# Patient Record
Sex: FEMALE | Race: WHITE | NOT HISPANIC OR LATINO | Employment: OTHER | URBAN - NONMETROPOLITAN AREA
[De-identification: names, ages, dates, MRNs, and addresses within clinical notes are randomized per-mention and may not be internally consistent; named-entity substitution may affect disease eponyms.]

---

## 2017-10-31 ENCOUNTER — TRANSCRIBE ORDERS (OUTPATIENT)
Dept: CARDIOLOGY | Facility: CLINIC | Age: 72
End: 2017-10-31

## 2017-10-31 DIAGNOSIS — R00.2 PALPITATIONS: ICD-10-CM

## 2017-10-31 DIAGNOSIS — R94.31 ABNORMAL EKG: Primary | ICD-10-CM

## 2017-11-14 ENCOUNTER — CONSULT (OUTPATIENT)
Dept: CARDIOLOGY | Facility: CLINIC | Age: 72
End: 2017-11-14

## 2017-11-14 VITALS
DIASTOLIC BLOOD PRESSURE: 86 MMHG | BODY MASS INDEX: 20.73 KG/M2 | HEIGHT: 63 IN | HEART RATE: 80 BPM | WEIGHT: 117 LBS | SYSTOLIC BLOOD PRESSURE: 118 MMHG

## 2017-11-14 DIAGNOSIS — R01.1 MURMUR, CARDIAC: ICD-10-CM

## 2017-11-14 DIAGNOSIS — E78.00 HYPERCHOLESTEREMIA: ICD-10-CM

## 2017-11-14 DIAGNOSIS — I10 ESSENTIAL HYPERTENSION: Primary | ICD-10-CM

## 2017-11-14 DIAGNOSIS — I25.6 SILENT MYOCARDIAL ISCHEMIA: ICD-10-CM

## 2017-11-14 DIAGNOSIS — R94.31 ABNORMAL EKG: ICD-10-CM

## 2017-11-14 DIAGNOSIS — R05.9 COUGH: ICD-10-CM

## 2017-11-14 DIAGNOSIS — R00.2 PALPITATIONS: ICD-10-CM

## 2017-11-14 DIAGNOSIS — R09.89 BILATERAL CAROTID BRUITS: ICD-10-CM

## 2017-11-14 DIAGNOSIS — R06.02 SHORTNESS OF BREATH: ICD-10-CM

## 2017-11-14 PROCEDURE — 99406 BEHAV CHNG SMOKING 3-10 MIN: CPT | Performed by: INTERNAL MEDICINE

## 2017-11-14 PROCEDURE — 93000 ELECTROCARDIOGRAM COMPLETE: CPT | Performed by: INTERNAL MEDICINE

## 2017-11-14 PROCEDURE — 99204 OFFICE O/P NEW MOD 45 MIN: CPT | Performed by: INTERNAL MEDICINE

## 2017-11-14 RX ORDER — BUDESONIDE 3 MG/1
3 CAPSULE, COATED PELLETS ORAL EVERY MORNING
COMMUNITY

## 2017-11-14 RX ORDER — PANTOPRAZOLE SODIUM 40 MG/1
40 TABLET, DELAYED RELEASE ORAL DAILY
COMMUNITY
End: 2021-01-01 | Stop reason: DRUGHIGH

## 2017-11-14 RX ORDER — FLUCONAZOLE 100 MG/1
100 TABLET ORAL DAILY
COMMUNITY
End: 2020-01-22 | Stop reason: ALTCHOICE

## 2017-11-14 RX ORDER — PROPRANOLOL HYDROCHLORIDE 20 MG/1
20 TABLET ORAL 2 TIMES DAILY
COMMUNITY
End: 2020-01-22 | Stop reason: ALTCHOICE

## 2017-11-14 RX ORDER — BROMPHENIRAMINE MALEATE, PSEUDOEPHEDRINE HYDROCHLORIDE, AND DEXTROMETHORPHAN HYDROBROMIDE 2; 30; 10 MG/5ML; MG/5ML; MG/5ML
10 SYRUP ORAL EVERY 4 HOURS PRN
COMMUNITY
End: 2020-01-22 | Stop reason: ALTCHOICE

## 2017-11-14 RX ORDER — CITALOPRAM 40 MG/1
40 TABLET ORAL DAILY
COMMUNITY
End: 2020-01-22 | Stop reason: ALTCHOICE

## 2017-11-14 RX ORDER — ATORVASTATIN CALCIUM 40 MG/1
TABLET, FILM COATED ORAL
COMMUNITY
End: 2020-03-16 | Stop reason: HOSPADM

## 2017-11-14 RX ORDER — AMOXICILLIN AND CLAVULANATE POTASSIUM 875; 125 MG/1; MG/1
1 TABLET, FILM COATED ORAL 2 TIMES DAILY
COMMUNITY
End: 2020-01-22 | Stop reason: ALTCHOICE

## 2017-11-14 RX ORDER — DULOXETIN HYDROCHLORIDE 60 MG/1
60 CAPSULE, DELAYED RELEASE ORAL 2 TIMES DAILY
COMMUNITY

## 2017-11-14 RX ORDER — ASPIRIN 81 MG/1
81 TABLET ORAL DAILY
COMMUNITY
End: 2020-03-16 | Stop reason: HOSPADM
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
## 2017-11-30 ENCOUNTER — HOSPITAL ENCOUNTER (OUTPATIENT)
Dept: CARDIOLOGY | Facility: HOSPITAL | Age: 72
Discharge: HOME | End: 2017-11-30
Attending: INTERNAL MEDICINE

## 2017-11-30 ENCOUNTER — OUTSIDE FACILITY SERVICE (OUTPATIENT)
Dept: CARDIOLOGY | Facility: CLINIC | Age: 72
End: 2017-11-30

## 2017-11-30 DIAGNOSIS — R94.31 ABNORMAL EKG: ICD-10-CM

## 2017-11-30 DIAGNOSIS — R01.1 MURMUR, CARDIAC: ICD-10-CM

## 2017-11-30 DIAGNOSIS — R06.02 SHORTNESS OF BREATH: ICD-10-CM

## 2017-11-30 LAB
MAXIMAL PREDICTED HEART RATE: 148 BPM
MAXIMAL PREDICTED HEART RATE: 148 BPM
STRESS TARGET HR: 126 BPM
STRESS TARGET HR: 126 BPM

## 2017-11-30 PROCEDURE — A9500 TC99M SESTAMIBI: HCPCS | Performed by: INTERNAL MEDICINE

## 2017-11-30 PROCEDURE — 93017 CV STRESS TEST TRACING ONLY: CPT

## 2017-11-30 PROCEDURE — 93018 CV STRESS TEST I&R ONLY: CPT | Performed by: INTERNAL MEDICINE

## 2017-11-30 PROCEDURE — 0 TECHNETIUM SESTAMIBI: Performed by: INTERNAL MEDICINE

## 2017-11-30 PROCEDURE — 25010000002 REGADENOSON 0.4 MG/5ML SOLUTION: Performed by: INTERNAL MEDICINE

## 2017-11-30 PROCEDURE — 93306 TTE W/DOPPLER COMPLETE: CPT

## 2017-11-30 PROCEDURE — 78452 HT MUSCLE IMAGE SPECT MULT: CPT

## 2017-11-30 PROCEDURE — 93306 TTE W/DOPPLER COMPLETE: CPT | Performed by: INTERNAL MEDICINE

## 2017-11-30 PROCEDURE — 78452 HT MUSCLE IMAGE SPECT MULT: CPT | Performed by: INTERNAL MEDICINE

## 2017-11-30 RX ADMIN — TECHNETIUM TC-99M SESTAMIBI 1 DOSE: 1 INJECTION INTRAVENOUS at 08:45

## 2017-11-30 RX ADMIN — REGADENOSON 0.4 MG: 0.08 INJECTION, SOLUTION INTRAVENOUS at 08:45

## 2017-12-04 ENCOUNTER — TELEPHONE (OUTPATIENT)
Dept: CARDIOLOGY | Facility: CLINIC | Age: 72
End: 2017-12-04

## 2017-12-04 RX ORDER — ISOSORBIDE MONONITRATE 30 MG/1
30 TABLET, EXTENDED RELEASE ORAL DAILY
Qty: 30 TABLET | Refills: 7 | Status: SHIPPED | OUTPATIENT
Start: 2017-12-04 | End: 2020-01-22 | Stop reason: ALTCHOICE

## 2017-12-13 ENCOUNTER — TELEPHONE (OUTPATIENT)
Dept: CARDIOLOGY | Facility: CLINIC | Age: 72
End: 2017-12-13

## 2017-12-13 RX ORDER — AMLODIPINE BESYLATE 2.5 MG/1
2.5 TABLET ORAL DAILY
Qty: 90 TABLET | Refills: 3 | Status: SHIPPED | OUTPATIENT
Start: 2017-12-13 | End: 2020-01-22 | Stop reason: ALTCHOICE

## 2017-12-13 NOTE — TELEPHONE ENCOUNTER
Patient called and reported that she cannot tolerate isosorbide 30 mg due to sever headache. What are your recommendations?

## 2017-12-13 NOTE — TELEPHONE ENCOUNTER
Patient aware to stop taking isosorbide and to start taking norvasc 2.5 mg QD. Script for norvasc 2.5 mg QD with 90 day supply and 3 refills.

## 2020-01-22 ENCOUNTER — OFFICE VISIT (OUTPATIENT)
Dept: CARDIOLOGY | Facility: CLINIC | Age: 75
End: 2020-01-22

## 2020-01-22 VITALS
BODY MASS INDEX: 20.73 KG/M2 | HEART RATE: 70 BPM | SYSTOLIC BLOOD PRESSURE: 110 MMHG | WEIGHT: 117 LBS | DIASTOLIC BLOOD PRESSURE: 70 MMHG | HEIGHT: 63 IN

## 2020-01-22 DIAGNOSIS — R00.2 PALPITATIONS: ICD-10-CM

## 2020-01-22 DIAGNOSIS — R42 DIZZINESS: Primary | ICD-10-CM

## 2020-01-22 DIAGNOSIS — F17.200 SMOKING: ICD-10-CM

## 2020-01-22 DIAGNOSIS — E78.00 HYPERCHOLESTEREMIA: ICD-10-CM

## 2020-01-22 DIAGNOSIS — I49.3 PVC (PREMATURE VENTRICULAR CONTRACTION): ICD-10-CM

## 2020-01-22 DIAGNOSIS — I10 ESSENTIAL HYPERTENSION: ICD-10-CM

## 2020-01-22 PROCEDURE — 99406 BEHAV CHNG SMOKING 3-10 MIN: CPT | Performed by: INTERNAL MEDICINE

## 2020-01-22 PROCEDURE — 93000 ELECTROCARDIOGRAM COMPLETE: CPT | Performed by: INTERNAL MEDICINE

## 2020-01-22 PROCEDURE — 99214 OFFICE O/P EST MOD 30 MIN: CPT | Performed by: INTERNAL MEDICINE

## 2020-01-22 RX ORDER — SERTRALINE HYDROCHLORIDE 25 MG/1
25 TABLET, FILM COATED ORAL DAILY
COMMUNITY
End: 2020-11-18 | Stop reason: ALTCHOICE

## 2020-01-22 RX ORDER — METOPROLOL TARTRATE 50 MG/1
50 TABLET, FILM COATED ORAL 2 TIMES DAILY
Qty: 60 TABLET | Refills: 11 | Status: SHIPPED | OUTPATIENT
Start: 2020-01-22 | End: 2020-02-13 | Stop reason: ALTCHOICE

## 2020-01-22 RX ORDER — SODIUM PHOSPHATE,MONO-DIBASIC 19G-7G/118
ENEMA (ML) RECTAL 2 TIMES DAILY WITH MEALS
COMMUNITY

## 2020-01-22 RX ORDER — CELECOXIB 100 MG/1
100 CAPSULE ORAL 2 TIMES DAILY
COMMUNITY
End: 2020-03-16 | Stop reason: HOSPADM

## 2020-01-22 RX ORDER — BUSPIRONE HYDROCHLORIDE 10 MG/1
10 TABLET ORAL 3 TIMES DAILY PRN
COMMUNITY
End: 2020-11-18

## 2020-01-22 RX ORDER — PROPRANOLOL HYDROCHLORIDE 40 MG/1
80 TABLET ORAL 2 TIMES DAILY
COMMUNITY
End: 2020-01-22

## 2020-01-22 RX ORDER — LANOLIN ALCOHOL/MO/W.PET/CERES
2000 CREAM (GRAM) TOPICAL DAILY
COMMUNITY

## 2020-01-22 RX ORDER — ALBUTEROL SULFATE 90 UG/1
2 AEROSOL, METERED RESPIRATORY (INHALATION) EVERY 4 HOURS PRN
COMMUNITY

## 2020-01-22 RX ORDER — LORATADINE 10 MG/1
10 TABLET ORAL DAILY
COMMUNITY

## 2020-01-22 RX ORDER — CHOLECALCIFEROL (VITAMIN D3) 125 MCG
5000 CAPSULE ORAL DAILY
COMMUNITY

## 2020-01-22 RX ORDER — AMLODIPINE BESYLATE 5 MG/1
5 TABLET ORAL 2 TIMES DAILY
COMMUNITY
End: 2020-11-18 | Stop reason: ALTCHOICE

## 2020-01-22 NOTE — PROGRESS NOTES
"Chief Complaint   Patient presents with   • Follow-up     Cardiac management. PCP requesting appointment for PVC's. She states \"yesterday it felt like I had a wave go thru my head, then my ear stopped up for few seconds, felt drained, then felt dizzy\". Increased aspirin 81mg to BID and Propanolol to 80mg BID yesterday.   • Lab     Have copy of most recent labs.   • Dizziness     Postional.   • Chest Pain     Last week had episode of intense pounding of heart, with pressure that radiated to right arm, with dizziness and nausea. She reports when this happened she was upset.       CARDIAC COMPLAINTS  chest pressure/discomfort, Dizziness and palpitations        Subjective   Maria Luz Wade is a 74 y.o. female came in today to be reevaluated regarding her cardiac status.  She has history of hypertension, hypercholesterolemia who was initially seen in 2017 for chest pain and shortness of breath.  She did undergo stress test in the form of Lexiscan which showed questionable area of ischemia involving the anterior wall.  Her echocardiogram showed normal LV systolic function with mild to moderate mitral regurgitation.  Her carotid ultrasound did not show any significant stenosis.  She was tried on Imdur and she was not able to tolerate so amlodipine was added.  I have not seen her for the last 2 years.  She has been under tremendous amount of stress recently.  She is taking care of 2 grandchildren.  Yesterday she had an episode where she felt like a wave going through her head and then felt that her ears were stuffed up for few seconds and then she got better.  After these episodes she felt tired weak and started having some dizziness.  She was seen at your office and the EKG showed some PVC's.  Her lab work showed the potassium was 4.1, magnesium was normal at 1.8 and TSH was normal at 3.06.  She was given aspirin and increase the dose of the Inderal and so far she has not had any more episodes.  About a week ago she did " have a chest pain in the form of pressure-like feeling associated with pounding of her heart.  The symptoms radiated both the right arm as well as the left arm and it was associated with some dizziness and nausea.  She has not had any more symptoms.  She unfortunately still continues to smoke about a pack a day.  Stroke does run in the family.              Cardiac History  Past Surgical History:   Procedure Laterality Date   • CARDIOVASCULAR STRESS TEST  11/30/2017    L.Myoview- R/O Anterior Ischemia.   • ECHO - CONVERTED  11/30/2017    EF 65%. Mild-Mod MR   • OTHER SURGICAL HISTORY  2015    CT Scan- 60% (L) ICA. 50% (R) ICA   • US CAROTID UNILATERAL  11/16/2017    @ [facility]. No sig stenosis       Current Outpatient Medications   Medication Sig Dispense Refill   • albuterol sulfate  (90 Base) MCG/ACT inhaler Inhale 2 puffs Every 4 (Four) Hours As Needed for Wheezing.     • amLODIPine (NORVASC) 5 MG tablet Take 5 mg by mouth 2 (Two) Times a Day.     • aspirin 81 MG EC tablet Take 81 mg by mouth 2 (Two) Times a Day.     • atorvastatin (LIPITOR) 40 MG tablet One and 1/2 tablets at night     • Budesonide (ENTOCORT EC) 3 MG 24 hr capsule Take 3 mg by mouth Every Morning.     • busPIRone (BUSPAR) 10 MG tablet Take 10 mg by mouth 3 (Three) Times a Day.     • celecoxib (CeleBREX) 100 MG capsule Take 100 mg by mouth 2 (Two) Times a Day.     • Cholecalciferol (VITAMIN D) 125 MCG (5000 UT) capsule capsule Take 5,000 Units by mouth Daily.     • DULoxetine (CYMBALTA) 60 MG capsule Take 60 mg by mouth 2 (Two) Times a Day.     • glucosamine-chondroitin 500-400 MG capsule capsule Take  by mouth 2 (Two) Times a Day With Meals.     • loratadine (CLARITIN) 10 MG tablet Take 10 mg by mouth Daily.     • pantoprazole (PROTONIX) 40 MG EC tablet Take 40 mg by mouth Daily.     • sertraline (ZOLOFT) 25 MG tablet Take 25 mg by mouth Daily.     • vitamin B-12 (CYANOCOBALAMIN) 1000 MCG tablet Take 2,000 mcg by mouth Daily.     • metoprolol  tartrate (LOPRESSOR) 50 MG tablet Take 1 tablet by mouth 2 (Two) Times a Day. 60 tablet 11   • nicotine (NICOTROL) 10 MG inhaler Inhale 1 puff As Needed for Smoking Cessation. 125 inhaler 4     No current facility-administered medications for this visit.        Allergies  :  Patient has no known allergies.       Past Medical History:   Diagnosis Date   • History of tonsillectomy 1951   • History of tubal ligation    • Hyperlipidemia        Social History     Socioeconomic History   • Marital status:      Spouse name: Not on file   • Number of children: Not on file   • Years of education: Not on file   • Highest education level: Not on file   Tobacco Use   • Smoking status: Current Every Day Smoker     Packs/day: 1.00     Years: 60.00     Pack years: 60.00   • Smokeless tobacco: Never Used   • Tobacco comment: patient counseled on effects of tobacco use on health   Substance and Sexual Activity   • Alcohol use: No   • Drug use: No       Family History   Problem Relation Age of Onset   • Hyperlipidemia Mother    • Hypertension Mother    • Stroke Mother    • Heart attack Father    • Aneurysm Father    • Alzheimer's disease Maternal Aunt    • Stroke Maternal Grandmother    • Stroke Maternal Grandfather        Review of Systems   Constitution: Positive for malaise/fatigue. Negative for decreased appetite.   HENT: Negative for congestion and sore throat.    Eyes: Negative for blurred vision.   Cardiovascular: Positive for chest pain and palpitations.   Respiratory: Negative for shortness of breath and snoring.    Endocrine: Negative for cold intolerance and heat intolerance.   Hematologic/Lymphatic: Negative for adenopathy. Does not bruise/bleed easily.   Skin: Negative for itching, nail changes and skin cancer.   Musculoskeletal: Negative for arthritis and myalgias.   Gastrointestinal: Negative for abdominal pain, dysphagia and heartburn.   Genitourinary: Negative for bladder incontinence and frequency.    "  Neurological: Positive for dizziness and light-headedness. Negative for seizures and vertigo.   Psychiatric/Behavioral: Negative for altered mental status.   Allergic/Immunologic: Negative for environmental allergies and hives.       Diabetes- No  Thyroid- normal    Objective     /70 (BP Location: Right arm)   Pulse 70   Ht 160 cm (63\")   Wt 53.1 kg (117 lb)   BMI 20.73 kg/m²     Physical Exam   Constitutional: She is oriented to person, place, and time. She appears well-developed and well-nourished.   HENT:   Head: Normocephalic.   Nose: Nose normal.   Eyes: Pupils are equal, round, and reactive to light. EOM are normal.   Neck: Normal range of motion. Neck supple.   Cardiovascular: Normal rate, regular rhythm, S1 normal and S2 normal.  Occasional extrasystoles are present.   Murmur heard.  Pulmonary/Chest: Effort normal and breath sounds normal.   Abdominal: Soft. Bowel sounds are normal.   Musculoskeletal: Normal range of motion. She exhibits no edema.   Neurological: She is alert and oriented to person, place, and time.   Skin: Skin is warm and dry.   Psychiatric: She has a normal mood and affect.       ECG 12 Lead  Date/Time: 1/22/2020 6:11 PM  Performed by: Tonya Lee MD  Authorized by: Tonya Lee MD   Comparison: compared with previous ECG from 1/21/2020  Similar to previous ECG  Rhythm: sinus rhythm  Ectopy: unifocal PVCs  Rate: normal  Conduction: non-specific intraventricular conduction delay  Q waves: V1 and V2      Clinical impression: abnormal EKG                    Assessment/Plan   Patient's Body mass index is 20.73 kg/m². BMI is within normal parameters. No follow-up required..     Maria Luz was seen today for follow-up, lab, dizziness and chest pain.    Diagnoses and all orders for this visit:    Dizziness  -     metoprolol tartrate (LOPRESSOR) 50 MG tablet; Take 1 tablet by mouth 2 (Two) Times a Day.  -     Holter Monitor - 72 Hour Up To 21 Days; Future    Essential " hypertension    Hypercholesteremia    Palpitations  -     metoprolol tartrate (LOPRESSOR) 50 MG tablet; Take 1 tablet by mouth 2 (Two) Times a Day.  -     Holter Monitor - 72 Hour Up To 21 Days; Future    PVC (premature ventricular contraction)  -     metoprolol tartrate (LOPRESSOR) 50 MG tablet; Take 1 tablet by mouth 2 (Two) Times a Day.  -     Holter Monitor - 72 Hour Up To 21 Days; Future    Smoking  -     nicotine (NICOTROL) 10 MG inhaler; Inhale 1 puff As Needed for Smoking Cessation.       At baseline her heart rate and blood pressure appears stable.  Her EKG showed sinus rhythm with few PVC's and Q waves in the anterior leads.  Her clinical examination reveals a BMI of 31.  She did have some irregular pulse.  She does have systolic murmur at the mitral area.  She has normal peripheral pulse and no pedal edema.    Regarding the dizziness, it could be related to the PVC's itself.  She takes short-acting Inderal twice a day.  I had a long talk with her and her daughter.  Gave the option of increasing the Inderal to 3 times a day versus starting her on a longer acting beta-blockers.  Lopressor at 50 mg twice a day was started.  I also placed a Holter monitor for the next 14 days.  This is to evaluate for any other arrhythmia including V. tach and also to see the frequency of the PVC's if there is significant increase in the frequency of the PVC then she may need to consider changing the beta-blocker.    Regarding the hypertension, at this time we will continue the amlodipine along with the beta-blockers.  If he develops any hypotension then the dose of amlodipine can be stopped    Regarding the hypercholesterolemia, she is already on Lipitor 40 mg and will continue the same.    Regarding the smoking, I had a long talk with her again.  This time she is willing to try Nicotrol inhalers and a prescription was given along with papers to help her quit smoking    Regarding the PVC's hopefully the longer acting  beta-blockers reduce the frequency.  We will review the Holter monitor and decide about further management.                  Electronically signed by Tonya Lee MD January 22, 2020 6:02 PM

## 2020-01-22 NOTE — PROGRESS NOTES
Maria Luz Wade  reports that she has been smoking. She has a 60.00 pack-year smoking history. She has never used smokeless tobacco.. I have educated her on the risk of diseases from using tobacco products such as cancer, COPD and heart diease.     I advised her to quit and she is willing to quit. We have discussed the following method/s for tobacco cessation:  Education Material Counseling Prescription Medicaiton.  Together we have set a quit date for 1 week from today.  She will follow up with me in 4 weeks or sooner to check on her progress.    I spent 3.5 minutes counseling the patient.

## 2020-02-12 ENCOUNTER — OUTSIDE FACILITY SERVICE (OUTPATIENT)
Dept: CARDIOLOGY | Facility: CLINIC | Age: 75
End: 2020-02-12

## 2020-02-12 PROCEDURE — 0298T PR EXT ECG > 48HR TO 21 DAY REVIEW AND INTERPRETATN: CPT | Performed by: INTERNAL MEDICINE

## 2020-02-13 NOTE — TELEPHONE ENCOUNTER
----- Message from Tonya Lee MD sent at 2/12/2020  4:26 PM EST -----  Freq PVC. One V-Tach. Change lopressor to sotalol 80 mg BID. EKG in 2 days

## 2020-02-25 ENCOUNTER — OFFICE VISIT (OUTPATIENT)
Dept: CARDIOLOGY | Facility: CLINIC | Age: 75
End: 2020-02-25

## 2020-02-25 VITALS
SYSTOLIC BLOOD PRESSURE: 140 MMHG | HEIGHT: 63 IN | DIASTOLIC BLOOD PRESSURE: 80 MMHG | BODY MASS INDEX: 21.09 KG/M2 | WEIGHT: 119 LBS | HEART RATE: 82 BPM

## 2020-02-25 DIAGNOSIS — F17.200 SMOKING: ICD-10-CM

## 2020-02-25 DIAGNOSIS — R42 DIZZINESS: ICD-10-CM

## 2020-02-25 DIAGNOSIS — E78.00 HYPERCHOLESTEREMIA: ICD-10-CM

## 2020-02-25 DIAGNOSIS — R00.2 PALPITATIONS: Primary | ICD-10-CM

## 2020-02-25 DIAGNOSIS — I10 ESSENTIAL HYPERTENSION: ICD-10-CM

## 2020-02-25 DIAGNOSIS — I49.3 PVC (PREMATURE VENTRICULAR CONTRACTION): ICD-10-CM

## 2020-02-25 PROCEDURE — 93000 ELECTROCARDIOGRAM COMPLETE: CPT | Performed by: INTERNAL MEDICINE

## 2020-02-25 PROCEDURE — 99213 OFFICE O/P EST LOW 20 MIN: CPT | Performed by: INTERNAL MEDICINE

## 2020-02-25 NOTE — PROGRESS NOTES
Chief Complaint   Patient presents with   • Follow-up     for cardiac management   • Monitor     frequent PVC's, one run of V Tach, lopressor stopped, sotalol 80 mg BID started.  Pt's follow up EKG's were at Sameera's office, see in chart.    • Palpitations     feels so much better since the med change, denies any palpitations.    • Med Refill     no refills currently needed   • Labs     most recent in chart       CARDIAC COMPLAINTS  Cardiac management        Subjective   Maria Luz Wade is a 74 y.o. female came in today for her regular follow-up visit.  She was seen about a month ago for evaluation of palpitation dizziness and found to have multiple PVC's.  And also had some chest discomfort.  She was put on long-acting beta blockers and then had a Holter monitor placed which showed close to 7% PVC's and 1 6 beat run of V. tach.  Her beta-blockers were at changed to sotalol.  She did have an EKG done at your office and found to have a normal QTC.  She came today stating she has not had any more palpitation she is feeling much better hardly have any symptoms now.  She unfortunately still continues to smoke.  Her labs are managed at your office.              Cardiac History  Past Surgical History:   Procedure Laterality Date   • CARDIOVASCULAR STRESS TEST  11/30/2017    L.Myoview- R/O Anterior Ischemia.   • CONVERTED (HISTORICAL) HOLTER  02/12/2020    AVG 77. . 6.8% PVC. one 6 beats run of V-Tach   • ECHO - CONVERTED  11/30/2017    EF 65%. Mild-Mod MR   • OTHER SURGICAL HISTORY  2015    CT Scan- 60% (L) ICA. 50% (R) ICA   • US CAROTID UNILATERAL  11/16/2017    @ [facility]. No sig stenosis       Current Outpatient Medications   Medication Sig Dispense Refill   • albuterol sulfate  (90 Base) MCG/ACT inhaler Inhale 2 puffs Every 4 (Four) Hours As Needed for Wheezing.     • amLODIPine (NORVASC) 5 MG tablet Take 5 mg by mouth 2 (Two) Times a Day.     • aspirin 81 MG EC tablet Take 81 mg by mouth 2 (Two) Times a Day.      • atorvastatin (LIPITOR) 40 MG tablet One and 1/2 tablets at night     • Budesonide (ENTOCORT EC) 3 MG 24 hr capsule Take 3 mg by mouth Every Morning.     • busPIRone (BUSPAR) 10 MG tablet Take 10 mg by mouth 3 (Three) Times a Day.     • celecoxib (CeleBREX) 100 MG capsule Take 100 mg by mouth 2 (Two) Times a Day.     • Cholecalciferol (VITAMIN D) 125 MCG (5000 UT) capsule capsule Take 5,000 Units by mouth Daily.     • DULoxetine (CYMBALTA) 60 MG capsule Take 60 mg by mouth 2 (Two) Times a Day.     • glucosamine-chondroitin 500-400 MG capsule capsule Take  by mouth 2 (Two) Times a Day With Meals.     • loratadine (CLARITIN) 10 MG tablet Take 10 mg by mouth Daily.     • pantoprazole (PROTONIX) 40 MG EC tablet Take 40 mg by mouth Daily.     • sertraline (ZOLOFT) 25 MG tablet Take 25 mg by mouth Daily.     • Sotalol HCl AF 80 MG tablet Take 1 tablet by mouth 2 (Two) Times a Day. 180 tablet 3   • vitamin B-12 (CYANOCOBALAMIN) 1000 MCG tablet Take 2,000 mcg by mouth Daily.       No current facility-administered medications for this visit.        Allergies  :  Patient has no known allergies.       Past Medical History:   Diagnosis Date   • History of tonsillectomy 1951   • History of tubal ligation    • Hyperlipidemia        Social History     Socioeconomic History   • Marital status:      Spouse name: Not on file   • Number of children: Not on file   • Years of education: Not on file   • Highest education level: Not on file   Tobacco Use   • Smoking status: Current Every Day Smoker     Packs/day: 1.00     Years: 60.00     Pack years: 60.00   • Smokeless tobacco: Never Used   • Tobacco comment: patient counseled on effects of tobacco use on health   Substance and Sexual Activity   • Alcohol use: No   • Drug use: No       Family History   Problem Relation Age of Onset   • Hyperlipidemia Mother    • Hypertension Mother    • Stroke Mother    • Heart attack Father    • Aneurysm Father    • Alzheimer's disease  "Maternal Aunt    • Stroke Maternal Grandmother    • Stroke Maternal Grandfather        Review of Systems   Constitution: Positive for malaise/fatigue. Negative for decreased appetite.   HENT: Negative for congestion and sore throat.    Eyes: Negative for blurred vision.   Cardiovascular: Negative for chest pain and palpitations.   Respiratory: Negative for shortness of breath and snoring.    Endocrine: Negative for cold intolerance and heat intolerance.   Hematologic/Lymphatic: Negative for adenopathy. Does not bruise/bleed easily.   Skin: Negative for itching, nail changes and skin cancer.   Musculoskeletal: Negative for arthritis and myalgias.   Gastrointestinal: Negative for abdominal pain, dysphagia and heartburn.   Genitourinary: Negative for bladder incontinence and frequency.   Neurological: Negative for dizziness, light-headedness, seizures and vertigo.   Psychiatric/Behavioral: Negative for altered mental status.   Allergic/Immunologic: Negative for environmental allergies and hives.       Diabetes- No  Thyroid- normal    Objective     /80   Pulse 82   Ht 160 cm (63\")   Wt 54 kg (119 lb)   BMI 21.08 kg/m²     Physical Exam   Constitutional: She is oriented to person, place, and time. She appears well-developed and well-nourished.   HENT:   Head: Normocephalic.   Nose: Nose normal.   Eyes: Pupils are equal, round, and reactive to light. EOM are normal.   Neck: Normal range of motion. Neck supple.   Cardiovascular: Normal rate, regular rhythm, S1 normal and S2 normal.   Murmur heard.  Pulmonary/Chest: Effort normal and breath sounds normal.   Abdominal: Soft. Bowel sounds are normal.   Musculoskeletal: Normal range of motion. She exhibits no edema.   Neurological: She is alert and oriented to person, place, and time.   Skin: Skin is warm and dry.   Psychiatric: She has a normal mood and affect.       ECG 12 Lead  Date/Time: 2/25/2020 2:35 PM  Performed by: Tonya Lee MD  Authorized by: " Tonya Lee MD   Comparison: compared with previous ECG from 2/22/2020  Similar to previous ECG  Rhythm: sinus rhythm  Ectopy: unifocal PVCs  Rate: normal  Conduction: non-specific intraventricular conduction delay  Q waves: V1 and V2    QRS axis: normal    Clinical impression: abnormal EKG                    Assessment/Plan   Patient's Body mass index is 21.08 kg/m². BMI is within normal parameters. No follow-up required..     Maria Luz was seen today for follow-up, monitor, palpitations, med refill and labs.    Diagnoses and all orders for this visit:    Palpitations    PVC (premature ventricular contraction)    Hypercholesteremia    Essential hypertension    Smoking    Dizziness       At baseline her heart rate is within normal limit.  Her blood pressure is borderline elevated.  Her EKG done today shows normal sinus rhythm with 1 PVC.  Old Q waves in the anterior leads and a normal QTc interval.  Her clinical examination is unremarkable.  Her BMI is around 21.  She does have a short systolic murmur same as before.    The palpitation appears to be secondary to the PVC's and it is now controlled with sotalol.  She is tolerating it well with no side effects.  At this time we will continue the same medication at the same dose.    Regarding her blood pressure, it seems to be controlled with the amlodipine and also the sotalol so we will continue the same.  Talked to her about cutting down on the sodium intake.    Regarding her hypercholesterolemia, she is on Lipitor and she is not having any side effects.  Will continue the same.    Regarding her smoking, I talked to her that she still continues to have increased risk of developing an MI, stroke or even malignancy.  At this time she is not interested in quitting.    Regarding her dizziness, it seems to be better after controlling the heart rate.  Continue to monitor.    Overall cardiac status appears stable.  I will see her back in 6 months or sooner if  needed.                  Electronically signed by Tonya Lee MD February 25, 2020 2:30 PM

## 2020-03-02 ENCOUNTER — TELEPHONE (OUTPATIENT)
Dept: CARDIOLOGY | Facility: CLINIC | Age: 75
End: 2020-03-02

## 2020-03-02 DIAGNOSIS — R42 DIZZINESS: ICD-10-CM

## 2020-03-02 DIAGNOSIS — I10 ESSENTIAL HYPERTENSION: ICD-10-CM

## 2020-03-02 DIAGNOSIS — I47.29 VENTRICULAR TACHYCARDIA (PAROXYSMAL) (HCC): Primary | ICD-10-CM

## 2020-03-02 DIAGNOSIS — R53.83 OTHER FATIGUE: ICD-10-CM

## 2020-03-02 NOTE — TELEPHONE ENCOUNTER
Pt's daughter Sameera called. Since Thursday her BP has been elevated.  170/100 pulse in the 70's.  She has added Lisinopril 20 mg daily which has only brought it as low as 150/90. Pt generally not feeling well, dizziness fatigue . She is doing labs and EKG on her in the morning and will send us results. She said you had mentioned if she developed any more problems you felt like she may need a cath. She would like to proceed with that if you feel necessary. She would love for you to do the cath but does NOT, under any circumstance, want Dr Smith to do the stenting if needed. She would be agreeable to go to  and  Dr Bass do it.

## 2020-03-03 NOTE — TELEPHONE ENCOUNTER
Spoke with Sameera, they are fine with 3/11/20 at 11:00 for the cath. Advised her that you would be calling her later with details.      Please call Sameera    Cell: [phone]  Work: [phone]  Home: [phone]

## 2020-03-04 NOTE — TELEPHONE ENCOUNTER
See [facility] cath referral for further communications.  Patient and Sameera aware of cardiac cath instructions.

## 2020-03-11 ENCOUNTER — APPOINTMENT (OUTPATIENT)
Dept: GENERAL RADIOLOGY | Facility: HOSPITAL | Age: 75
End: 2020-03-11

## 2020-03-11 ENCOUNTER — APPOINTMENT (OUTPATIENT)
Dept: CT IMAGING | Facility: HOSPITAL | Age: 75
End: 2020-03-11

## 2020-03-11 ENCOUNTER — DOCUMENTATION (OUTPATIENT)
Dept: SOCIAL WORK | Facility: HOSPITAL | Age: 75
End: 2020-03-11

## 2020-03-11 ENCOUNTER — APPOINTMENT (OUTPATIENT)
Dept: CARDIOLOGY | Facility: HOSPITAL | Age: 75
End: 2020-03-11

## 2020-03-11 ENCOUNTER — HOSPITAL ENCOUNTER (INPATIENT)
Facility: HOSPITAL | Age: 75
LOS: 5 days | Discharge: NURSING, REHAB OR LONG-TERM CARE | End: 2020-03-16
Attending: INTERNAL MEDICINE | Admitting: INTERNAL MEDICINE

## 2020-03-11 DIAGNOSIS — R13.10 DYSPHAGIA, UNSPECIFIED TYPE: Primary | ICD-10-CM

## 2020-03-11 DIAGNOSIS — R47.1 DYSARTHRIA: ICD-10-CM

## 2020-03-11 DIAGNOSIS — I63.421 CEREBROVASCULAR ACCIDENT (CVA) DUE TO EMBOLISM OF RIGHT ANTERIOR CEREBRAL ARTERY (HCC): ICD-10-CM

## 2020-03-11 DIAGNOSIS — R41.841 COGNITIVE COMMUNICATION DEFICIT: ICD-10-CM

## 2020-03-11 PROBLEM — E78.5 DYSLIPIDEMIA: Status: ACTIVE | Noted: 2020-01-22

## 2020-03-11 PROBLEM — I63.9 STROKE (HCC): Status: ACTIVE | Noted: 2020-03-11

## 2020-03-11 PROBLEM — Z92.82: Status: ACTIVE | Noted: 2020-03-11

## 2020-03-11 LAB
GLUCOSE BLDC GLUCOMTR-MCNC: 102 MG/DL (ref 70–130)
GLUCOSE BLDC GLUCOMTR-MCNC: 122 MG/DL (ref 70–130)

## 2020-03-11 PROCEDURE — 61645 PERQ ART M-THROMBECT &/NFS: CPT | Performed by: NEUROLOGICAL SURGERY

## 2020-03-11 PROCEDURE — 0 IOPAMIDOL PER 1 ML: Performed by: INTERNAL MEDICINE

## 2020-03-11 PROCEDURE — 25010000002 FENTANYL CITRATE (PF) 100 MCG/2ML SOLUTION: Performed by: NEUROLOGICAL SURGERY

## 2020-03-11 PROCEDURE — C1725 CATH, TRANSLUMIN NON-LASER: HCPCS | Performed by: NEUROLOGICAL SURGERY

## 2020-03-11 PROCEDURE — C1760 CLOSURE DEV, VASC: HCPCS | Performed by: NEUROLOGICAL SURGERY

## 2020-03-11 PROCEDURE — 93880 EXTRACRANIAL BILAT STUDY: CPT

## 2020-03-11 PROCEDURE — 03CG3Z7 EXTIRPATION OF MATTER FROM INTRACRANIAL ARTERY USING STENT RETRIEVER, PERCUTANEOUS APPROACH: ICD-10-PCS | Performed by: NEUROLOGICAL SURGERY

## 2020-03-11 PROCEDURE — 25010000002 ONDANSETRON PER 1 MG: Performed by: INTERNAL MEDICINE

## 2020-03-11 PROCEDURE — 71045 X-RAY EXAM CHEST 1 VIEW: CPT

## 2020-03-11 PROCEDURE — 99223 1ST HOSP IP/OBS HIGH 75: CPT | Performed by: INTERNAL MEDICINE

## 2020-03-11 PROCEDURE — 93306 TTE W/DOPPLER COMPLETE: CPT | Performed by: INTERNAL MEDICINE

## 2020-03-11 PROCEDURE — C1769 GUIDE WIRE: HCPCS | Performed by: NEUROLOGICAL SURGERY

## 2020-03-11 PROCEDURE — 70450 CT HEAD/BRAIN W/O DYE: CPT

## 2020-03-11 PROCEDURE — 93880 EXTRACRANIAL BILAT STUDY: CPT | Performed by: INTERNAL MEDICINE

## 2020-03-11 PROCEDURE — C1894 INTRO/SHEATH, NON-LASER: HCPCS | Performed by: NEUROLOGICAL SURGERY

## 2020-03-11 PROCEDURE — B30RYZZ PLAIN RADIOGRAPHY OF INTRACRANIAL ARTERIES USING OTHER CONTRAST: ICD-10-PCS | Performed by: NEUROLOGICAL SURGERY

## 2020-03-11 PROCEDURE — C2628 CATHETER, OCCLUSION: HCPCS | Performed by: NEUROLOGICAL SURGERY

## 2020-03-11 PROCEDURE — 92610 EVALUATE SWALLOWING FUNCTION: CPT

## 2020-03-11 PROCEDURE — 82962 GLUCOSE BLOOD TEST: CPT

## 2020-03-11 PROCEDURE — 0 IODIXANOL PER 1 ML: Performed by: NEUROLOGICAL SURGERY

## 2020-03-11 PROCEDURE — 93306 TTE W/DOPPLER COMPLETE: CPT

## 2020-03-11 PROCEDURE — 25010000002 MIDAZOLAM PER 1 MG: Performed by: NEUROLOGICAL SURGERY

## 2020-03-11 PROCEDURE — 0042T HC CT CEREBRAL PERFUSION W/WO CONTRAST: CPT

## 2020-03-11 RX ORDER — ONDANSETRON 2 MG/ML
4 INJECTION INTRAMUSCULAR; INTRAVENOUS EVERY 6 HOURS PRN
Status: DISCONTINUED | OUTPATIENT
Start: 2020-03-11 | End: 2020-03-16 | Stop reason: HOSPADM

## 2020-03-11 RX ORDER — ASPIRIN 300 MG/1
300 SUPPOSITORY RECTAL DAILY
Status: DISCONTINUED | OUTPATIENT
Start: 2020-03-11 | End: 2020-03-13

## 2020-03-11 RX ORDER — MIDAZOLAM HYDROCHLORIDE 1 MG/ML
INJECTION INTRAMUSCULAR; INTRAVENOUS AS NEEDED
Status: DISCONTINUED | OUTPATIENT
Start: 2020-03-11 | End: 2020-03-11 | Stop reason: HOSPADM

## 2020-03-11 RX ORDER — SODIUM CHLORIDE 0.9 % (FLUSH) 0.9 %
10 SYRINGE (ML) INJECTION AS NEEDED
Status: DISCONTINUED | OUTPATIENT
Start: 2020-03-11 | End: 2020-03-11

## 2020-03-11 RX ORDER — ASPIRIN 325 MG
325 TABLET ORAL DAILY
Status: DISCONTINUED | OUTPATIENT
Start: 2020-03-12 | End: 2020-03-11

## 2020-03-11 RX ORDER — IPRATROPIUM BROMIDE AND ALBUTEROL SULFATE 2.5; .5 MG/3ML; MG/3ML
3 SOLUTION RESPIRATORY (INHALATION) EVERY 6 HOURS PRN
Status: DISCONTINUED | OUTPATIENT
Start: 2020-03-11 | End: 2020-03-13

## 2020-03-11 RX ORDER — ASPIRIN 325 MG
325 TABLET, DELAYED RELEASE (ENTERIC COATED) ORAL DAILY
Status: DISCONTINUED | OUTPATIENT
Start: 2020-03-11 | End: 2020-03-13

## 2020-03-11 RX ORDER — SODIUM CHLORIDE 0.9 % (FLUSH) 0.9 %
10 SYRINGE (ML) INJECTION EVERY 12 HOURS SCHEDULED
Status: DISCONTINUED | OUTPATIENT
Start: 2020-03-11 | End: 2020-03-11

## 2020-03-11 RX ORDER — MAGNESIUM SULFATE HEPTAHYDRATE 40 MG/ML
2 INJECTION, SOLUTION INTRAVENOUS AS NEEDED
Status: DISCONTINUED | OUTPATIENT
Start: 2020-03-11 | End: 2020-03-16 | Stop reason: HOSPADM

## 2020-03-11 RX ORDER — ACETAMINOPHEN 325 MG/1
650 TABLET ORAL EVERY 6 HOURS PRN
Status: DISCONTINUED | OUTPATIENT
Start: 2020-03-11 | End: 2020-03-13

## 2020-03-11 RX ORDER — POTASSIUM CHLORIDE 1.5 G/1.77G
40 POWDER, FOR SOLUTION ORAL AS NEEDED
Status: DISCONTINUED | OUTPATIENT
Start: 2020-03-11 | End: 2020-03-16 | Stop reason: HOSPADM

## 2020-03-11 RX ORDER — ATORVASTATIN CALCIUM 40 MG/1
80 TABLET, FILM COATED ORAL NIGHTLY
Status: DISCONTINUED | OUTPATIENT
Start: 2020-03-11 | End: 2020-03-11

## 2020-03-11 RX ORDER — ASPIRIN 300 MG/1
300 SUPPOSITORY RECTAL DAILY
Status: DISCONTINUED | OUTPATIENT
Start: 2020-03-11 | End: 2020-03-11

## 2020-03-11 RX ORDER — LIDOCAINE HYDROCHLORIDE 10 MG/ML
INJECTION, SOLUTION EPIDURAL; INFILTRATION; INTRACAUDAL; PERINEURAL AS NEEDED
Status: DISCONTINUED | OUTPATIENT
Start: 2020-03-11 | End: 2020-03-11 | Stop reason: HOSPADM

## 2020-03-11 RX ORDER — ASPIRIN 300 MG/1
300 SUPPOSITORY RECTAL DAILY
Status: DISCONTINUED | OUTPATIENT
Start: 2020-03-12 | End: 2020-03-11

## 2020-03-11 RX ORDER — SODIUM CHLORIDE 9 MG/ML
75 INJECTION, SOLUTION INTRAVENOUS CONTINUOUS
Status: ACTIVE | OUTPATIENT
Start: 2020-03-11 | End: 2020-03-11

## 2020-03-11 RX ORDER — FENTANYL CITRATE 50 UG/ML
INJECTION, SOLUTION INTRAMUSCULAR; INTRAVENOUS AS NEEDED
Status: DISCONTINUED | OUTPATIENT
Start: 2020-03-11 | End: 2020-03-11 | Stop reason: HOSPADM

## 2020-03-11 RX ORDER — ATORVASTATIN CALCIUM 40 MG/1
80 TABLET, FILM COATED ORAL NIGHTLY
Status: DISCONTINUED | OUTPATIENT
Start: 2020-03-11 | End: 2020-03-16 | Stop reason: HOSPADM

## 2020-03-11 RX ORDER — ACETAMINOPHEN 650 MG/1
650 SUPPOSITORY RECTAL EVERY 4 HOURS PRN
Status: DISCONTINUED | OUTPATIENT
Start: 2020-03-11 | End: 2020-03-13

## 2020-03-11 RX ORDER — NICOTINE 21 MG/24HR
1 PATCH, TRANSDERMAL 24 HOURS TRANSDERMAL
Status: DISCONTINUED | OUTPATIENT
Start: 2020-03-11 | End: 2020-03-16 | Stop reason: HOSPADM

## 2020-03-11 RX ORDER — POTASSIUM CHLORIDE 7.45 MG/ML
10 INJECTION INTRAVENOUS
Status: DISCONTINUED | OUTPATIENT
Start: 2020-03-11 | End: 2020-03-16 | Stop reason: HOSPADM

## 2020-03-11 RX ORDER — POTASSIUM CHLORIDE 750 MG/1
40 CAPSULE, EXTENDED RELEASE ORAL AS NEEDED
Status: DISCONTINUED | OUTPATIENT
Start: 2020-03-11 | End: 2020-03-16 | Stop reason: HOSPADM

## 2020-03-11 RX ORDER — MAGNESIUM SULFATE HEPTAHYDRATE 40 MG/ML
4 INJECTION, SOLUTION INTRAVENOUS AS NEEDED
Status: DISCONTINUED | OUTPATIENT
Start: 2020-03-11 | End: 2020-03-16 | Stop reason: HOSPADM

## 2020-03-11 RX ORDER — SOTALOL HYDROCHLORIDE 80 MG/1
40 TABLET ORAL EVERY 12 HOURS SCHEDULED
Status: DISCONTINUED | OUTPATIENT
Start: 2020-03-11 | End: 2020-03-12

## 2020-03-11 RX ORDER — METOPROLOL TARTRATE 5 MG/5ML
INJECTION INTRAVENOUS AS NEEDED
Status: DISCONTINUED | OUTPATIENT
Start: 2020-03-11 | End: 2020-03-11 | Stop reason: HOSPADM

## 2020-03-11 RX ORDER — IODIXANOL 320 MG/ML
INJECTION, SOLUTION INTRAVASCULAR AS NEEDED
Status: DISCONTINUED | OUTPATIENT
Start: 2020-03-11 | End: 2020-03-11 | Stop reason: HOSPADM

## 2020-03-11 RX ADMIN — ATORVASTATIN CALCIUM 80 MG: 40 TABLET, FILM COATED ORAL at 20:25

## 2020-03-11 RX ADMIN — NICOTINE 1 PATCH: 21 PATCH, EXTENDED RELEASE TRANSDERMAL at 16:14

## 2020-03-11 RX ADMIN — ACETAMINOPHEN 650 MG: 325 TABLET, FILM COATED ORAL at 16:37

## 2020-03-11 RX ADMIN — IOPAMIDOL 40 ML: 755 INJECTION, SOLUTION INTRAVENOUS at 12:15

## 2020-03-11 RX ADMIN — SODIUM CHLORIDE 5 MG/HR: 9 INJECTION, SOLUTION INTRAVENOUS at 21:53

## 2020-03-11 RX ADMIN — SOTALOL HYDROCHLORIDE 40 MG: 80 TABLET ORAL at 23:21

## 2020-03-11 RX ADMIN — SODIUM CHLORIDE 75 ML/HR: 9 INJECTION, SOLUTION INTRAVENOUS at 15:49

## 2020-03-11 RX ADMIN — ACETAMINOPHEN 650 MG: 325 TABLET, FILM COATED ORAL at 23:08

## 2020-03-11 RX ADMIN — ASPIRIN 325 MG: 325 TABLET, COATED ORAL at 15:49

## 2020-03-11 RX ADMIN — ONDANSETRON 4 MG: 2 INJECTION INTRAMUSCULAR; INTRAVENOUS at 15:49

## 2020-03-11 NOTE — PLAN OF CARE
Problem: Patient Care Overview  Goal: Plan of Care Review  Outcome: Ongoing (interventions implemented as appropriate)  Flowsheets (Taken 3/11/2020 1431)  Progress: -- (eval)  Plan of Care Reviewed With: patient  Note:   SLP evaluation completed. Will  sign off dysphagia Please see note for further details and recommendations.

## 2020-03-11 NOTE — PROGRESS NOTES
Discharge Planning Assessment  [facility]     Patient Name: Maria Luz Wade  MRN: [id number]  Today's Date: 3/11/2020    Admit Date: 3/11/2020    Discharge Needs Assessment     Row Name 03/11/20 1419       Living Environment    Lives With  child(farzad), adult    Name(s) of Who Lives With Patient  Janell daughter    Current Living Arrangements  home/apartment/condo    Primary Care Provided by  self    Provides Primary Care For  no one    Family Caregiver if Needed  child(farzad), adult    Family Caregiver Names  Janell dtr    Quality of Family Relationships  helpful;involved;supportive    Living Arrangement Comments  Pt lives in the basement of her daughters home in [place], [place]. Pt is independent with ADL's and mobility PTA. Pt has not had HH She does have all DME from her spouse that has past away. Pt has power lift in her tub also lift chair.       Resource/Environmental Concerns    Resource/Environmental Concerns  none       Transition Planning    Patient/Family Anticipates Transition to  home    Patient/Family Anticipated Services at Transition  none       Discharge Needs Assessment    Readmission Within the Last 30 Days  no previous admission in last 30 days    Concerns to be Addressed  basic needs;adjustment to diagnosis/illness    Equipment Currently Used at Home  -- pt has hosp.bed, walker ,w/c cpap from her spouse he has past away.    Anticipated Changes Related to Illness  none    Equipment Needed After Discharge  none    Discharge Coordination/Progress  PCP Rhonda HENSON+B        Discharge Plan     Row Name 03/11/20 1426       Plan    Plan  IDP    Plan Comments  Met with patient and her daughter Janell at . Pt lives with her daughter in the basement of her home in [place], [place]. Pt is independent with ADL and mobility PTA. Pt has not had HH. CM will follow as needed.    Final Discharge Disposition Code  03 - skilled nursing facility (SNF)        Destination      Coordination has not been started for  this encounter.      Durable Medical Equipment      Coordination has not been started for this encounter.      Dialysis/Infusion      Coordination has not been started for this encounter.      Home Medical Care      Coordination has not been started for this encounter.      Therapy      Coordination has not been started for this encounter.      Community Resources      Coordination has not been started for this encounter.        Expected Discharge Date and Time     Expected Discharge Date Expected Discharge Time    Mar 16, 2020         Demographic Summary     Row Name 03/11/20 1418       General Information    Admission Type  inpatient    Arrived From  hospital    Referral Source  admission list    Reason for Consult  discharge planning    Preferred Language  English        Functional Status     Row Name 03/11/20 1419       Functional Status    Usual Activity Tolerance  good       Functional Status, IADL    Medications  independent    Meal Preparation  independent    Housekeeping  independent    Laundry  independent    Shopping  independent        Psychosocial    No documentation.       Abuse/Neglect    No documentation.       Legal    No documentation.       Substance Abuse    No documentation.       Patient Forms    No documentation.           Lenora Pappas RN

## 2020-03-11 NOTE — THERAPY EVALUATION
Acute Care - Speech Language Pathology   Swallow Initial Evaluation [facility]   Clinical Swallow Evaluation       Patient Name: Maria Luz Wade  : 1945  MRN: [id number]  Today's Date: 3/11/2020               Admit Date: 3/11/2020    Visit Dx:   No diagnosis found.  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   • CONVERTED (HISTORICAL) HOLTER  2020    AVG 77. . 6.8% PVC. one 6 beats run of V-Tach   • ECHO - CONVERTED  2017    EF 65%. Mild-Mod MR   • OTHER SURGICAL HISTORY      CT Scan- 60% (L) ICA. 50% (R) ICA   • US CAROTID UNILATERAL  2017    @ [facility]. No sig stenosis        SWALLOW EVALUATION (last 72 hours)      SLP Adult Swallow Evaluation     Row Name 20 1400                   Rehab Evaluation    Document Type  evaluation  -AV        Subjective Information  no complaints  -AV        Patient Observations  alert;cooperative  -AV        Patient/Family Observations  no family present   -AV        Patient Effort  good  -AV           General Information    Patient Profile Reviewed  yes  -AV        Pertinent History Of Current Problem  CVA post TPA and thrombectomy  -AV        Current Method of Nutrition  NPO  -AV        Precautions/Limitations, Vision  WFL;for purposes of eval  -AV        Precautions/Limitations, Hearing  WFL;for purposes of eval  -AV        Prior Level of Function-Communication  WFL  -AV        Prior Level of  Function-Swallowing  no diet consistency restrictions  -AV        Plans/Goals Discussed with  patient  -AV        Barriers to Rehab  medically complex  -AV        Patient's Goals for Discharge  return to PO diet  -AV           Pain Assessment    Additional Documentation  Pain Scale: FACES Pre/Post-Treatment (Group)  -AV           Pain Scale: FACES Pre/Post-Treatment    Pain: FACES Scale, Pretreatment  0-->no hurt  -AV        Pain: FACES Scale, Post-Treatment  0-->no hurt  -AV           Oral Motor and Function    Dentition Assessment  edentulous, dentures not available  -AV        Secretion Management  dried secretions in oral cavity  -AV        Mucosal Quality  dry  -AV        Volitional Swallow  WFL  -AV        Volitional Cough  WFL  -AV           Oral Musculature and Cranial Nerve Assessment    Oral Motor General Assessment  oral labial or buccal impairment  -AV           General Eating/Swallowing Observations    Respiratory Support Currently in Use  nasal cannula  -AV        Eating/Swallowing Skills  fed by SLP  -AV        Positioning During Eating  upright in bed  -AV        Utensils Used  spoon;cup;straw  -AV        Consistencies Trialed  regular textures;pureed;thin liquids  -AV           Clinical Swallow Eval    Oral Prep Phase  WFL  -AV        Oral Transit  WFL  -AV        Oral Residue  WFL  -AV        Pharyngeal Phase  no overt signs/symptoms of pharyngeal impairment  -AV        Esophageal Phase  unremarkable  -AV        Clinical Swallow Evaluation Summary  CSE completed this pm: no overt s/s with trials at bedside. Safe for reg and thins at this time. Will follow up tomorrow for SLC eval.   -AV           Clinical Impression    SLP Swallowing Diagnosis  functional oral phase;functional pharyngeal phase  -AV        Functional Impact  no impact on function  -AV        Rehab Potential/Prognosis, Swallowing  good, to achieve stated therapy goals  -AV        Swallow Criteria for Skilled Therapeutic Interventions  Met  baseline status  -AV           Recommendations    Therapy Frequency (Swallow)  evaluation only  -AV        SLP Diet Recommendation  regular textures;thin liquids  -AV        SLP Rec. for Method of Medication Administration  meds whole;as tolerated  -AV        Monitor for Signs of Aspiration  yes;notify SLP if any concerns  -AV        Anticipated Dischage Disposition  unknown;anticipate therapy at next level of care  -AV          User Key  (r) = Recorded By, (t) = Taken By, (c) = Cosigned By    Initials Name Effective Dates    AV Esmer Valdes MS CCC-SLP 05/23/19 -           EDUCATION  The patient has been educated in the following areas:   Dysphagia (Swallowing Impairment) Oral Care/Hydration.    SLP Recommendation and Plan  SLP Swallowing Diagnosis: functional oral phase, functional pharyngeal phase  SLP Diet Recommendation: regular textures, thin liquids     SLP Rec. for Method of Medication Administration: meds whole, as tolerated     Monitor for Signs of Aspiration: yes, notify SLP if any concerns     Swallow Criteria for Skilled Therapeutic Interventions Met: baseline status  Anticipated Dischage Disposition: unknown, anticipate therapy at next level of care  Rehab Potential/Prognosis, Swallowing: good, to achieve stated therapy goals  Therapy Frequency (Swallow): evaluation only          Plan of Care Reviewed With: patient  Progress: (eval)           Time Calculation:   Time Calculation- SLP     Row Name 03/11/20 1433             Time Calculation- SLP    SLP Start Time  1330  -AV      SLP Received On  03/11/20  -        User Key  (r) = Recorded By, (t) = Taken By, (c) = Cosigned By    Initials Name Provider Type    Esmer Stout MS CCC-SLP Speech and Language Pathologist          Therapy Charges for Today     Code Description Service Date Service Provider Modifiers Qty    [id number]  ST EVAL ORAL PHARYNG SWALLOW 3 3/11/2020 Esmer Valdes MS CCC-SLP GN 1                Esmer Moseley, MS CCC-SLP  3/11/2020

## 2020-03-11 NOTE — H&P
ICU ADMISSION NOTE    Chief complaint L-sided weakness   Right-sided CVA  Hypertension  Dyslipidemia  Tobacco abuse  Palpitations    Subjective     Maria Luz Wade is a 74 y.o. female smoker admitted to [facility] on 3/11 from [facility] for management of stroke.     The patient presented to [facility] ED with complaints of slurred speech, left-sided hemiparesis, and left facial droop with NIH of 5 and GCS 15 on arrival. Symptoms began ~0610 while taking a shower. CT head negative for hemorrhage and she was deemed appropriate for thrombolytic therapy that was initiated at 0852. She is being transferred to [facility] for higher level of care.     On [facility] ICU arrival NIH 9 and perfusion imaging shows an anterior R MCA infarct with moderate amount of ischemic penumbra. She was taken to the cath lab per Dr. Monroy for successful Trevo mechanical thrombectomy of the right MCA thrombus suspected cardioembolic in origin. Post-cath she was transferred to the neurosurgical ICU in stable condition on Nicardipine infusion.     She was recently evaluated by her cardiologist and underwent Holter monitoring found to have 7% PVCs and a solitary 6 beat run of VT placed on sotalol. There is no mention of atrial arrhythmias. There is also a history of carotid stenosis diagnosed in 2015 evaluated by CTS who recommended serial imaging. Most recent duplex from 2017 revealed mild bilateral plaque formation without hemodynamically significant disease. At that time echocardiogram revealed EF >65% with evidence of mild diastolic dysfunction as well as mild-moderate MR and pathologic TR. she was scheduled to have a heart catheterization but it has not been completed.  She has never suffered a heart attack.  She is a current smoker.  She denies a chronic cough, wheezing.  She does have a rescue inhaler.  She does not wear home oxygen.  She also takes medication for dyslipidemia and hypertension.  Palpitations improved significantly when she was started on  sotalol.      Review of Systems  Review of Systems   Respiratory: Negative for cough and wheezing.    Cardiovascular: Negative for chest pain and palpitations.   Musculoskeletal: Positive for gait problem.   Neurological: Positive for facial asymmetry (Left facial droop ), speech difficulty and weakness (Left sided ).   All other systems reviewed and are negative.       Home Medications  Medications Prior to Admission   Medication Sig Dispense Refill Last Dose   • albuterol sulfate  (90 Base) MCG/ACT inhaler Inhale 2 puffs Every 4 (Four) Hours As Needed for Wheezing.   Taking   • amLODIPine (NORVASC) 5 MG tablet Take 5 mg by mouth 2 (Two) Times a Day.   Taking   • aspirin 81 MG EC tablet Take 81 mg by mouth 2 (Two) Times a Day.   Taking   • atorvastatin (LIPITOR) 40 MG tablet One and 1/2 tablets at night   Taking   • Budesonide (ENTOCORT EC) 3 MG 24 hr capsule Take 3 mg by mouth Every Morning.   Taking   • busPIRone (BUSPAR) 10 MG tablet Take 10 mg by mouth 3 (Three) Times a Day.   Taking   • celecoxib (CeleBREX) 100 MG capsule Take 100 mg by mouth 2 (Two) Times a Day.   Taking   • Cholecalciferol (VITAMIN D) 125 MCG (5000 UT) capsule capsule Take 5,000 Units by mouth Daily.   Taking   • DULoxetine (CYMBALTA) 60 MG capsule Take 60 mg by mouth 2 (Two) Times a Day.   Taking   • glucosamine-chondroitin 500-400 MG capsule capsule Take  by mouth 2 (Two) Times a Day With Meals.   Taking   • loratadine (CLARITIN) 10 MG tablet Take 10 mg by mouth Daily.   Taking   • pantoprazole (PROTONIX) 40 MG EC tablet Take 40 mg by mouth Daily.   Taking   • sertraline (ZOLOFT) 25 MG tablet Take 25 mg by mouth Daily.   Taking   • Sotalol HCl AF 80 MG tablet Take 1 tablet by mouth 2 (Two) Times a Day. 180 tablet 3 Taking   • vitamin B-12 (CYANOCOBALAMIN) 1000 MCG tablet Take 2,000 mcg by mouth Daily.   Taking       History  Past Medical History:   Diagnosis Date   • History of tonsillectomy 1951   • History of tubal ligation    •  Hyperlipidemia      Past Surgical History:   Procedure Laterality Date   • CARDIOVASCULAR STRESS TEST  11/30/2017    L.Myoview- R/O Anterior Ischemia.   • CONVERTED (HISTORICAL) HOLTER  02/12/2020    AVG 77. . 6.8% PVC. one 6 beats run of V-Tach   • ECHO - CONVERTED  11/30/2017    EF 65%. Mild-Mod MR   • OTHER SURGICAL HISTORY  2015    CT Scan- 60% (L) ICA. 50% (R) ICA   • US CAROTID UNILATERAL  11/16/2017    @ [facility]. No sig stenosis     Family History   Problem Relation Age of Onset   • Hyperlipidemia Mother    • Hypertension Mother    • Stroke Mother    • Heart attack Father    • Aneurysm Father    • Alzheimer's disease Maternal Aunt    • Stroke Maternal Grandmother    • Stroke Maternal Grandfather      Social History     Tobacco Use   • Smoking status: Current Every Day Smoker     Packs/day: 1.00     Years: 60.00     Pack years: 60.00   • Smokeless tobacco: Never Used   • Tobacco comment: patient counseled on effects of tobacco use on health   Substance Use Topics   • Alcohol use: No   • Drug use: No     Medications Prior to Admission   Medication Sig Dispense Refill Last Dose   • albuterol sulfate  (90 Base) MCG/ACT inhaler Inhale 2 puffs Every 4 (Four) Hours As Needed for Wheezing.   Taking   • amLODIPine (NORVASC) 5 MG tablet Take 5 mg by mouth 2 (Two) Times a Day.   Taking   • aspirin 81 MG EC tablet Take 81 mg by mouth 2 (Two) Times a Day.   Taking   • atorvastatin (LIPITOR) 40 MG tablet One and 1/2 tablets at night   Taking   • Budesonide (ENTOCORT EC) 3 MG 24 hr capsule Take 3 mg by mouth Every Morning.   Taking   • busPIRone (BUSPAR) 10 MG tablet Take 10 mg by mouth 3 (Three) Times a Day.   Taking   • celecoxib (CeleBREX) 100 MG capsule Take 100 mg by mouth 2 (Two) Times a Day.   Taking   • Cholecalciferol (VITAMIN D) 125 MCG (5000 UT) capsule capsule Take 5,000 Units by mouth Daily.   Taking   • DULoxetine (CYMBALTA) 60 MG capsule Take 60 mg by mouth 2 (Two) Times a Day.   Taking   •  glucosamine-chondroitin 500-400 MG capsule capsule Take  by mouth 2 (Two) Times a Day With Meals.   Taking   • loratadine (CLARITIN) 10 MG tablet Take 10 mg by mouth Daily.   Taking   • pantoprazole (PROTONIX) 40 MG EC tablet Take 40 mg by mouth Daily.   Taking   • sertraline (ZOLOFT) 25 MG tablet Take 25 mg by mouth Daily.   Taking   • Sotalol HCl AF 80 MG tablet Take 1 tablet by mouth 2 (Two) Times a Day. 180 tablet 3 Taking   • vitamin B-12 (CYANOCOBALAMIN) 1000 MCG tablet Take 2,000 mcg by mouth Daily.   Taking     Allergies:  Patient has no known allergies.      Objective     Vital Signs  Blood pressure 146/97, pulse 98, resp. rate 16, weight 52.8 kg (116 lb 6.5 oz), SpO2 92 %.    Physical Exam:  General Appearance:   Well-developed older woman in no acute distress   Head:   Normocephalic, atraumatic   Eyes:          Pupils equal and reactive to light.  Extraocular movements intact.   Ears:     Throat:  Oral mucosa moist.   Neck:  Trachea midline, no palpable thyroid   Back:      Lungs:    Symmetric chest expansion.  Prolonged expiration.  No wheeze or rhonchi    Heart:   Regular rhythm, S1, S2 auscultated, no murmur.  Occasional early beat   Abdomen:    Protuberant, bowel sounds present, soft, nontender   Rectal:     Deferred   Extremities:    Right femoral catheterization site without hematoma.  Feet are warm and viable.  No pitting edema   Pulses:      Skin:  Warm and dry   Lymph nodes:    Neurologic:  Alert and oriented.  Left facial droop.  Tongue midline.  Follows commands.  No motor drift upper or lower extremities.  Fine motor activity is clumsy on the left upper extremity       Results Review:   Lab Results (last 24 hours)     ** No results found for the last 24 hours. **        Imaging Results (Last 24 Hours)     Procedure Component Value Units Date/Time    CT Cerebral Perfusion With & Without Contrast [[id number]] Collected:  03/11/20 1135     Updated:  03/11/20 1200    Narrative:       EXAMINATION:  CT CEREBRAL PERFUSION W WO CONTRAST-, CT HEAD WO CONTRAST-   03/11/2020     INDICATION: TIA, initial screening     TECHNIQUE: 5 mm unenhanced images through the brain. Subsequently,  cerebral perfusion analysis was performed using computed tomography with  contrast administration, including postprocessing of parametric mass  with determination of cerebral blood flow, cerebral blood volume and  mean transit time.       The radiation dose reduction device was turned on for each scan per the  ALARA (As Low as Reasonably Achievable) protocol.     COMPARISON: Outside CT scan of 02/05/2015.     FINDINGS: HEAD CT SCAN WITHOUT CONTRAST: The calvarium appears intact.  Included paranasal sinuses and mastoids appear clear. Soft tissue window  images show low attenuation change in the right frontal white matter and  some loss of the gray/white matter interface over an at least 6 cm area.  The patient appears to have some residual intravascular contrast, but  allowing for this, no evidence of hemorrhage is identified. There is no  evidence of infarct elsewhere, no evidence of mass, mass effect,  hydrocephalus or abnormal extra-axial collection.       Impression:       1. Approximately 6 mm area of low attenuation in the right frontal lobe,  consistent with developing MCA territory edema/infarct.       2. Suggestion of residual intravascular contrast from an outside study.  Allowing for this, no evidence of hemorrhage is seen. No evidence of  other acute intracranial disease elsewhere.     CEREBRAL PERFUSION SCAN WITH AND WITHOUT CONTRAST: Rapid analysis shows  the area of developing edema on the unenhanced CT scan to contain an  area of cerebral blood flow less than 30% calculated at 29 mm consistent  with core infarct. Mapping for Tmax greater than six seconds shows a  larger area of potential ischemic penumbra, estimated at 60 mm with  mismatch volume of 30 mL. The individual imaging runs for Tmax, mean  transit time, time to  drain and cerebral blood flow all show similar  sized abnormalities, with a significantly smaller central area on  cerebral blood volume, consistent with core infarct. No significant  asymmetry of perfusion is appreciated elsewhere.     IMPRESSION: Anterior right MCA territory infarct, with moderate  surrounding ischemic penumbra, as discussed above. No evidence of  ischemia/infarct elsewhere.     D:  03/11/2020  E:  03/11/2020       CT Head Without Contrast [[id number]] Collected:  03/11/20 1135     Updated:  03/11/20 1200    Narrative:       EXAMINATION: CT CEREBRAL PERFUSION W WO CONTRAST-, CT HEAD WO CONTRAST-   03/11/2020     INDICATION: TIA, initial screening     TECHNIQUE: 5 mm unenhanced images through the brain. Subsequently,  cerebral perfusion analysis was performed using computed tomography with  contrast administration, including postprocessing of parametric mass  with determination of cerebral blood flow, cerebral blood volume and  mean transit time.       The radiation dose reduction device was turned on for each scan per the  ALARA (As Low as Reasonably Achievable) protocol.     COMPARISON: Outside CT scan of 02/05/2015.     FINDINGS: HEAD CT SCAN WITHOUT CONTRAST: The calvarium appears intact.  Included paranasal sinuses and mastoids appear clear. Soft tissue window  images show low attenuation change in the right frontal white matter and  some loss of the gray/white matter interface over an at least 6 cm area.  The patient appears to have some residual intravascular contrast, but  allowing for this, no evidence of hemorrhage is identified. There is no  evidence of infarct elsewhere, no evidence of mass, mass effect,  hydrocephalus or abnormal extra-axial collection.       Impression:       1. Approximately 6 mm area of low attenuation in the right frontal lobe,  consistent with developing MCA territory edema/infarct.       2. Suggestion of residual intravascular contrast from an outside  study.  Allowing for this, no evidence of hemorrhage is seen. No evidence of  other acute intracranial disease elsewhere.     CEREBRAL PERFUSION SCAN WITH AND WITHOUT CONTRAST: Rapid analysis shows  the area of developing edema on the unenhanced CT scan to contain an  area of cerebral blood flow less than 30% calculated at 29 mm consistent  with core infarct. Mapping for Tmax greater than six seconds shows a  larger area of potential ischemic penumbra, estimated at 60 mm with  mismatch volume of 30 mL. The individual imaging runs for Tmax, mean  transit time, time to drain and cerebral blood flow all show similar  sized abnormalities, with a significantly smaller central area on  cerebral blood volume, consistent with core infarct. No significant  asymmetry of perfusion is appreciated elsewhere.     IMPRESSION: Anterior right MCA territory infarct, with moderate  surrounding ischemic penumbra, as discussed above. No evidence of  ischemia/infarct elsewhere.     D:  03/11/2020  E:  03/11/2020              PROBLEM LIST  Patient Active Problem List   Diagnosis   • Dizziness   • PVC (premature ventricular contraction)   • Smoker   • Palpitations   • Dyslipidemia on statin    • Essential hypertension   • R MCA AIS s/p tPA and thrombectomy    • Tissue plasminogen activator (tPA) administered at other facility within 24 hours prior to current admission       Assessment/Plan      #1 right middle cerebral artery, anterior segment stroke status post TPA and mechanical thrombectomy.  She does not have motor weakness.  She does have a facial droop.  Speech is mildly dysarthric.  Risk factors include high blood pressure, dyslipidemia, tobacco abuse.    #2 recent Holter monitor with PVCs and one episode of nonsustained ventricular tachycardia.  No previous history of MI.  Scheduled for heart catheterization to evaluate arrhythmias.  She currently does not have chest pain or acute ischemic changes.    #3 tobacco abuse, she does  have an as needed albuterol inhaler.  She does need a screening CAT scan of the chest to look for lung cancer.  A chest x-ray might be reasonable.  Currently she has no purulent sputum or active bronchospasm.      1. Admit to ICU   2. STAT CTH/CTP (done)   3. Implement post-tPA ischemic stroke order set   4. Neurovascular checks per protocol   5. Nicardipine as needed   6. Echo with agitated saline   7. Carotid duplex   8. 24hr post-tPA CT head tomorrow @ 1000   9. Chest x-ray  10. PRN nebulized bronchodilators  11. If she passes her swallow evaluation resume sotalol  12. Monitor electrolytes and replace as needed    I discussed the patients findings and my recommendations with multidisciplinary team    PATRICK García, AGACN-BC, FNP-BC   Pulmonary and Critical Care     I personally reviewed her records, interviewed the patient and her daughter.  Performed the above physical examination.  Reviewed her laboratory data.  Looked at her EKG and CT scans.  I formulated the assessment and then modified the plan and note to reflect my additions and findings    Lenora Coto MD      Time:40min    This note was produced with a voice recognition program and may have uncorrected errors.

## 2020-03-11 NOTE — PROGRESS NOTES
"  ACC REVIEW REPORT: [facility]        PATIENT NAME: Maria Luz Wade    PATIENT ID: [id number]    BED: [id number]    BED TYPE: CRITICAL CARE    BED GIVEN TO:  JANETH ROTH RN    TIME BED GIVEN: 0913    YOB: 1945    AGE: 74    GENDER: FEMALE    TODAY'S DATE: 3/11/2020    TRANSFER DATE: 3/11/2020    ETA: 1100    TRANSFERRING FACILITY: [facility]    TRANSFERRING FACILITY PHONE # : [phone]    TRANSFERRING MD: DR. SANDOVAL    ACCEPTING PROVIDER: PATRICK COLLINS    NEUROLOGY PHYSICIAN: YASMINE    DATE/TIME REQUEST RECEIVED: 3/11/2020 0841    [facility] RN: HERNESTO MEJIA RN    REPORT FROM: JANETH ROTH RN    TIME REPORT TAKEN: 0906    DIAGNOSIS: CVA    REASON FOR TRANSFER TO [facility]: HIGHER LEVEL OF CARE    TRANSPORTATION: EMS GROUND    CLINICAL REASON FOR TRANSFER TO [facility]: TPA AND NEUROLOGY SERVICES      CLINICAL INFORMATION    WEIGHT: 54 KG    ALLERGIES: NKA    LAST VITAL SIGNS:  TIME: 0800  TEMP: AFEBRILE  PULSE: 80  B/P: 106/65  RESP: 16    LAB INFORMATION: , BUT ALL OTHER LABS \"OK\"    MEDS/IV FLUIDS: 47MG TPA BOLUS, THEN RATE BEGAN AT 0852      CARDIAC SYSTEM:    CHEST PAIN: NO    RATE: 80    RHYTHM: NSR    Is patient taking or has patient been given any drugs that could increase bleeding? NO  (Plavix, Brilinta, Effient, Eliquis, Xarelto, Warfarin, Integrilin, Angiomax)    RESPIRATORY SYSTEM:    OXYGEN: R/A    O2 SAT: 97%    CNS/MUSCULOSKELETAL    LAST KNOWN WELL: 0630    NIHSS    Survey Item  0: Means Alert  1: Drowsy or Answer Correctly  2: Incorrect, Forced, Can't Resist Gravity  3: Complete or No Effort  4: No Movement  NT: Not Testable Acceptable As Noted Above      1A: Level of Consciousness: 0    1B: LOC Questions (month, age) : 0    1C: LOC Commands (open/close eyes, make a fist & let go): 0    2:  Best Gaze (eyes open-pt follows examiner's fingers or face): 0    3:  Visual (introduce visual stimulus/threat to pt's visual field quad. Cover 1 eye and hold up fingers in all 4 " quadrants) : 0    4.  Facial Palsy (show teeth, raise eyebrows and squeeze eyes tightly shut): 2    5A: Motor Arm-Left (elevate extremity to 90 degrees and score drift/movement.  Count to 10 aloud and use fingers for visual cue): 1    5B:  Motor Arm-Right (elevate extremity to 90 degrees and score drift/movement.  Count to 10 aloud and use fingers for visual cue): 0    6A:  Motor Leg-Left (elevate extremity to 30 degrees and score drift/movement.  Count to 5 out loud and use fingers for visual cue): 1    6B:  Motor Leg-Right (elevate extremity to 30 degrees and score drift/movement.  Count to 5 out loud and use fingers for visual cue): 0    7:  Limb Ataxia- finger to nose, heel down shin: 0    8:  Sensory- pin prick to face, arms, trunk, and legs. Compare sharpness side to side: 1    9:  Best Language- name, items, describe picture, and read sentences.  Do not forget glasses if they normally wear them: 0    10: Dysarthria- elevate speech clarity by pt reading or repeating words on a list: 0    11: Extinction and Inattention- Use information from prior testing or double simultaneous stimuli testing to identify neglect. Face, arms, legs and visual field: 0    Total NIHSS Score: 5  Date: 3/11/20  Time of NIHSS Assessment: 0800        SIZE OF HEMORRHAGE: N/A    CAT SCAN RESULTS: MCA CLOT    MRI RESULTS: N/A    CNS/MUSCULOSKELETAL NOTES: FACIAL PALSY, SLURRED SPEECH, LEFT SIDED WEAKNESS      GI//GY      ABDOMINAL PAIN: NO    VOMITING: NO    DIARRHEA: NO    NAUSEA: NO    BOWEL SOUNDS: NO    OCCULT STOOL: NO    VAGINAL BLEEDING: NO    TESTICULAR PAIN: N/A    HEMATURIA: NO    PAST MEDICAL HISTORY: HYPERLIPIDEMIA, HTN, 1 PPD SMOKER FOR 60 YEARS    TUBAL LIGATION    ADDITIONAL NOTES: #18 LEFT A/C, # 20 RIGHT F/A          Madina Dejesus RN  3/11/2020  08:51

## 2020-03-11 NOTE — NURSING NOTE
Stroke Navigator CODE STROKE    HPI    Maria Luz Wade is a 74 y.o.  female on whom I am evaluating as a Code Stroke for an acute stroke.  The patient woke up at 5:50 AM this morning, she proceeded to get in the shower at 6:10 AM.  While she was in the shower she became uncoordinated and weak on the left.  She received tPA at [facility] and was transported to us via EMS as there was no flights due to weather.  She has a past medical history of hyperlipidemia, hypertension, and smoking.  She was scheduled for heart cath today at [facility].  There is record of a Holter monitor which showed PVCs and 1 6 beat run of V-tach.  She was recently started on 80 mg of sotalol twice daily.  See Dr. Lee notes.    Code Stroke location: Transfer    · Last known well: 06:10 am    · GCS: 15  · Baseline level of function known: yes. Modified Brook[place]: 0   · Current symptoms include; extremity weakness, extremity numbness, facial droop, ataxic, dysarthria and neglect, affecting primarily the left face and upper extremity. Symptoms are currently unchanged.   · Patient is a candidate for thrombolytic therapy and received it at [facility].  The administration completed at 9:50 AM  · Patient is a candidate for Neuro Intervention and is transported to the cath lab suite.    Stroke risk factors: hyperlipidemia, hypertension and smoking.     Prior stroke history: no  · Imaging performed: CT head and CT perfusion      NIHSS    Interval: baseline  1a. Level of Consciousness: 0-->Alert, keenly responsive  1b. LOC Questions: 0-->Answers both questions correctly  1c. LOC Commands: 0-->Performs both tasks correctly  2. Best Gaze: 0-->Normal  3. Visual: 1-->Partial hemianopia  4. Facial Palsy: 2-->Partial paralysis (total or near-total paralysis of lower face)  5a. Motor Arm, Left: 1-->Drift, limb holds 90 (or 45) degrees, but drifts down before full 10 seconds, does not hit bed or other support  5b. Motor Arm, Right: 0-->No  drift, limb holds 90 (or 45) degrees for full 10 secs  6a. Motor Leg, Left: 0-->No drift, leg holds 30 degree position for full 5 secs  6b. Motor Leg, Right: 0-->No drift, leg holds 30 degree position for full 5 secs  7. Limb Ataxia: 1-->Present in one limb  8. Sensory: 2-->Severe to total sensory loss, patient is not aware of being touched in the face, arm, and leg  9. Best Language: 0-->No aphasia, normal  10. Dysarthria: 1-->Mild-to-moderate dysarthria, patient slurs at least some words and, at worst, can be understood with some difficulty  11. Extinction and Inattention (formerly Neglect): 1-->Visual, tactile, auditory, spatial, or personal inattention or extinction to bilateral simultaneous stimulation in one of the sensory modalities    Total (NIH Stroke Scale): 9      PLAN    Ischemic Stroke Admission With Thrombolytic Therapy  ICU  Start Eliquis tomorrow after her head CT.    Justina Horta RN EXTENDER/STROKE NAVIGATOR

## 2020-03-12 ENCOUNTER — APPOINTMENT (OUTPATIENT)
Dept: CT IMAGING | Facility: HOSPITAL | Age: 75
End: 2020-03-12

## 2020-03-12 PROBLEM — I62.9 INTRACRANIAL BLEED (HCC): Status: ACTIVE | Noted: 2020-03-12

## 2020-03-12 LAB
ANION GAP SERPL CALCULATED.3IONS-SCNC: 14 MMOL/L (ref 5–15)
BH CV ECHO MEAS - AO MAX PG (FULL): 8.4 MMHG
BH CV ECHO MEAS - AO MAX PG: 13 MMHG
BH CV ECHO MEAS - AO MEAN PG (FULL): 4.3 MMHG
BH CV ECHO MEAS - AO MEAN PG: 7 MMHG
BH CV ECHO MEAS - AO ROOT AREA (BSA CORRECTED): 2
BH CV ECHO MEAS - AO ROOT AREA: 7.2 CM^2
BH CV ECHO MEAS - AO ROOT DIAM: 3 CM
BH CV ECHO MEAS - AO V2 MAX: 179.1 CM/SEC
BH CV ECHO MEAS - AO V2 MEAN: 121.1 CM/SEC
BH CV ECHO MEAS - AO V2 VTI: 34.1 CM
BH CV ECHO MEAS - AVA(I,A): 2.3 CM^2
BH CV ECHO MEAS - AVA(I,D): 2.3 CM^2
BH CV ECHO MEAS - AVA(V,A): 1.8 CM^2
BH CV ECHO MEAS - AVA(V,D): 1.8 CM^2
BH CV ECHO MEAS - BSA(HAYCOCK): 1.5 M^2
BH CV ECHO MEAS - BSA(HAYCOCK): 1.5 M^2
BH CV ECHO MEAS - BSA: 1.5 M^2
BH CV ECHO MEAS - BSA: 1.5 M^2
BH CV ECHO MEAS - BZI_BMI: 20.5 KILOGRAMS/M^2
BH CV ECHO MEAS - BZI_BMI: 20.5 KILOGRAMS/M^2
BH CV ECHO MEAS - BZI_METRIC_HEIGHT: 160 CM
BH CV ECHO MEAS - BZI_METRIC_HEIGHT: 160 CM
BH CV ECHO MEAS - BZI_METRIC_WEIGHT: 52.6 KG
BH CV ECHO MEAS - BZI_METRIC_WEIGHT: 52.6 KG
BH CV ECHO MEAS - EDV(CUBED): 21.6 ML
BH CV ECHO MEAS - EDV(MOD-SP2): 37.5 ML
BH CV ECHO MEAS - EDV(MOD-SP4): 33.7 ML
BH CV ECHO MEAS - EDV(TEICH): 29.1 ML
BH CV ECHO MEAS - EF(CUBED): 61 %
BH CV ECHO MEAS - EF(MOD-SP2): 80.9 %
BH CV ECHO MEAS - EF(MOD-SP4): 73.2 %
BH CV ECHO MEAS - EF(TEICH): 54.4 %
BH CV ECHO MEAS - ESV(CUBED): 8.4 ML
BH CV ECHO MEAS - ESV(MOD-SP2): 7.2 ML
BH CV ECHO MEAS - ESV(MOD-SP4): 9 ML
BH CV ECHO MEAS - ESV(TEICH): 13.3 ML
BH CV ECHO MEAS - FS: 26.9 %
BH CV ECHO MEAS - IVS/LVPW: 1.1
BH CV ECHO MEAS - IVSD: 1.1 CM
BH CV ECHO MEAS - LA DIMENSION: 2.5 CM
BH CV ECHO MEAS - LA/AO: 0.82
BH CV ECHO MEAS - LAD MAJOR: 4.8 CM
BH CV ECHO MEAS - LAT PEAK E' VEL: 11.6 CM/SEC
BH CV ECHO MEAS - LATERAL E/E' RATIO: 6.7
BH CV ECHO MEAS - LV DIASTOLIC VOL/BSA (35-75): 22 ML/M^2
BH CV ECHO MEAS - LV MASS(C)D: 86.1 GRAMS
BH CV ECHO MEAS - LV MASS(C)DI: 56.1 GRAMS/M^2
BH CV ECHO MEAS - LV MAX PG: 4.6 MMHG
BH CV ECHO MEAS - LV MEAN PG: 2.7 MMHG
BH CV ECHO MEAS - LV SYSTOLIC VOL/BSA (12-30): 5.9 ML/M^2
BH CV ECHO MEAS - LV V1 MAX: 107.1 CM/SEC
BH CV ECHO MEAS - LV V1 MEAN: 75.1 CM/SEC
BH CV ECHO MEAS - LV V1 VTI: 25.4 CM
BH CV ECHO MEAS - LVIDD: 2.8 CM
BH CV ECHO MEAS - LVIDS: 2 CM
BH CV ECHO MEAS - LVLD AP2: 6.5 CM
BH CV ECHO MEAS - LVLD AP4: 6.5 CM
BH CV ECHO MEAS - LVLS AP2: 4.4 CM
BH CV ECHO MEAS - LVLS AP4: 5.8 CM
BH CV ECHO MEAS - LVOT AREA (M): 3.1 CM^2
BH CV ECHO MEAS - LVOT AREA: 3.1 CM^2
BH CV ECHO MEAS - LVOT DIAM: 2 CM
BH CV ECHO MEAS - LVPWD: 1.1 CM
BH CV ECHO MEAS - MED PEAK E' VEL: 4.8 CM/SEC
BH CV ECHO MEAS - MEDIAL E/E' RATIO: 16.2
BH CV ECHO MEAS - MV A MAX VEL: 146.3 CM/SEC
BH CV ECHO MEAS - MV DEC TIME: 0.46 SEC
BH CV ECHO MEAS - MV E MAX VEL: 77.7 CM/SEC
BH CV ECHO MEAS - MV E/A: 0.53
BH CV ECHO MEAS - MV MAX PG: 13.3 MMHG
BH CV ECHO MEAS - MV MEAN PG: 5.8 MMHG
BH CV ECHO MEAS - MV V2 MAX: 181.9 CM/SEC
BH CV ECHO MEAS - MV V2 MEAN: 109.5 CM/SEC
BH CV ECHO MEAS - MV V2 VTI: 31 CM
BH CV ECHO MEAS - MVA(VTI): 2.5 CM^2
BH CV ECHO MEAS - PA ACC TIME: 0.12 SEC
BH CV ECHO MEAS - PA MAX PG: 5.3 MMHG
BH CV ECHO MEAS - PA PR(ACCEL): 25.1 MMHG
BH CV ECHO MEAS - PA V2 MAX: 115.4 CM/SEC
BH CV ECHO MEAS - SI(AO): 159.4 ML/M^2
BH CV ECHO MEAS - SI(CUBED): 8.6 ML/M^2
BH CV ECHO MEAS - SI(LVOT): 51 ML/M^2
BH CV ECHO MEAS - SI(MOD-SP2): 19.8 ML/M^2
BH CV ECHO MEAS - SI(MOD-SP4): 16.1 ML/M^2
BH CV ECHO MEAS - SI(TEICH): 10.3 ML/M^2
BH CV ECHO MEAS - SV(AO): 244.5 ML
BH CV ECHO MEAS - SV(CUBED): 13.2 ML
BH CV ECHO MEAS - SV(LVOT): 78.2 ML
BH CV ECHO MEAS - SV(MOD-SP2): 30.4 ML
BH CV ECHO MEAS - SV(MOD-SP4): 24.7 ML
BH CV ECHO MEAS - SV(TEICH): 15.8 ML
BH CV ECHO MEAS - TAPSE (>1.6): 2.38 CM2
BH CV ECHO MEASUREMENTS AVERAGE E/E' RATIO: 9.48
BH CV VAS BP RIGHT ARM: NORMAL MMHG
BH CV XLRA - RV BASE: 24 CM
BH CV XLRA - RV LENGTH: 4.9 CM
BH CV XLRA - RV MID: 1.8 CM
BH CV XLRA - TDI S': 15.2 CM/SEC
BH CV XLRA MEAS LEFT CCA RATIO VEL: 67.8 CM/SEC
BH CV XLRA MEAS LEFT DIST CCA EDV: 14.7 CM/SEC
BH CV XLRA MEAS LEFT DIST CCA PSV: 51.6 CM/SEC
BH CV XLRA MEAS LEFT DIST ICA EDV: 27.5 CM/SEC
BH CV XLRA MEAS LEFT DIST ICA PSV: 79.6 CM/SEC
BH CV XLRA MEAS LEFT ICA RATIO VEL: 79.6 CM/SEC
BH CV XLRA MEAS LEFT ICA/CCA RATIO: 1.2
BH CV XLRA MEAS LEFT MID CCA EDV: 14.3 CM/SEC
BH CV XLRA MEAS LEFT MID CCA PSV: 50.6 CM/SEC
BH CV XLRA MEAS LEFT MID ICA EDV: 27.5 CM/SEC
BH CV XLRA MEAS LEFT MID ICA PSV: 74.7 CM/SEC
BH CV XLRA MEAS LEFT PROX CCA EDV: 15.2 CM/SEC
BH CV XLRA MEAS LEFT PROX CCA PSV: 67.8 CM/SEC
BH CV XLRA MEAS LEFT PROX ICA EDV: 13.8 CM/SEC
BH CV XLRA MEAS LEFT PROX ICA PSV: 51.6 CM/SEC
BH CV XLRA MEAS LEFT PROX SCLA PSV: 83.1 CM/SEC
BH CV XLRA MEAS LEFT VERTEBRAL A EDV: 12.8 CM/SEC
BH CV XLRA MEAS LEFT VERTEBRAL A PSV: 44 CM/SEC
BH CV XLRA MEAS RIGHT CCA RATIO VEL: 70.2 CM/SEC
BH CV XLRA MEAS RIGHT DIST CCA EDV: 11.4 CM/SEC
BH CV XLRA MEAS RIGHT DIST CCA PSV: 46.5 CM/SEC
BH CV XLRA MEAS RIGHT DIST ICA EDV: 28 CM/SEC
BH CV XLRA MEAS RIGHT DIST ICA PSV: 81 CM/SEC
BH CV XLRA MEAS RIGHT ICA RATIO VEL: 74.6 CM/SEC
BH CV XLRA MEAS RIGHT ICA/CCA RATIO: 1.1
BH CV XLRA MEAS RIGHT MID CCA EDV: 12.3 CM/SEC
BH CV XLRA MEAS RIGHT MID CCA PSV: 54.4 CM/SEC
BH CV XLRA MEAS RIGHT MID ICA EDV: 10 CM/SEC
BH CV XLRA MEAS RIGHT MID ICA PSV: 74 CM/SEC
BH CV XLRA MEAS RIGHT PROX CCA EDV: 18 CM/SEC
BH CV XLRA MEAS RIGHT PROX CCA PSV: 70.2 CM/SEC
BH CV XLRA MEAS RIGHT PROX ECA EDV: 5.6 CM/SEC
BH CV XLRA MEAS RIGHT PROX ECA PSV: 58.1 CM/SEC
BH CV XLRA MEAS RIGHT PROX ICA EDV: 10.5 CM/SEC
BH CV XLRA MEAS RIGHT PROX ICA PSV: 74.6 CM/SEC
BH CV XLRA MEAS RIGHT PROX SCLA PSV: 143.8 CM/SEC
BH CV XLRA MEAS RIGHT VERTEBRAL A EDV: 8.4 CM/SEC
BH CV XLRA MEAS RIGHT VERTEBRAL A PSV: 57.5 CM/SEC
BUN BLD-MCNC: 8 MG/DL (ref 8–23)
BUN/CREAT SERPL: 17.4 (ref 7–25)
CALCIUM SPEC-SCNC: 8.8 MG/DL (ref 8.6–10.5)
CHLORIDE SERPL-SCNC: 104 MMOL/L (ref 98–107)
CHOLEST SERPL-MCNC: 160 MG/DL (ref 0–200)
CO2 SERPL-SCNC: 20 MMOL/L (ref 22–29)
CREAT BLD-MCNC: 0.46 MG/DL (ref 0.57–1)
DEPRECATED RDW RBC AUTO: 50.5 FL (ref 37–54)
ERYTHROCYTE [DISTWIDTH] IN BLOOD BY AUTOMATED COUNT: 13.4 % (ref 12.3–15.4)
GFR SERPL CREATININE-BSD FRML MDRD: 133 ML/MIN/1.73
GLUCOSE BLD-MCNC: 109 MG/DL (ref 65–99)
HBA1C MFR BLD: 5.6 % (ref 4.8–5.6)
HCT VFR BLD AUTO: 44.9 % (ref 34–46.6)
HDLC SERPL-MCNC: 51 MG/DL (ref 40–60)
HGB BLD-MCNC: 13.9 G/DL (ref 12–15.9)
LDLC SERPL CALC-MCNC: 82 MG/DL (ref 0–100)
LDLC/HDLC SERPL: 1.61 {RATIO}
LEFT ARM BP: NORMAL MMHG
LEFT ATRIUM VOLUME INDEX: 21.4 ML/M^2
LEFT ATRIUM VOLUME: 32.9 ML
MAGNESIUM SERPL-MCNC: 1.7 MG/DL (ref 1.6–2.4)
MAXIMAL PREDICTED HEART RATE: 146 BPM
MCH RBC QN AUTO: 31.4 PG (ref 26.6–33)
MCHC RBC AUTO-ENTMCNC: 31 G/DL (ref 31.5–35.7)
MCV RBC AUTO: 101.4 FL (ref 79–97)
PLATELET # BLD AUTO: 227 10*3/MM3 (ref 140–450)
PMV BLD AUTO: 11 FL (ref 6–12)
POTASSIUM BLD-SCNC: 3.1 MMOL/L (ref 3.5–5.2)
POTASSIUM BLD-SCNC: 4.2 MMOL/L (ref 3.5–5.2)
RBC # BLD AUTO: 4.43 10*6/MM3 (ref 3.77–5.28)
RIGHT ARM BP: NORMAL MMHG
SODIUM BLD-SCNC: 138 MMOL/L (ref 136–145)
STRESS TARGET HR: 124 BPM
TRIGL SERPL-MCNC: 135 MG/DL (ref 0–150)
VLDLC SERPL-MCNC: 27 MG/DL
WBC NRBC COR # BLD: 11.87 10*3/MM3 (ref 3.4–10.8)

## 2020-03-12 PROCEDURE — 70450 CT HEAD/BRAIN W/O DYE: CPT

## 2020-03-12 PROCEDURE — 83036 HEMOGLOBIN GLYCOSYLATED A1C: CPT | Performed by: PSYCHIATRY & NEUROLOGY

## 2020-03-12 PROCEDURE — 92610 EVALUATE SWALLOWING FUNCTION: CPT

## 2020-03-12 PROCEDURE — 80061 LIPID PANEL: CPT | Performed by: PSYCHIATRY & NEUROLOGY

## 2020-03-12 PROCEDURE — 84132 ASSAY OF SERUM POTASSIUM: CPT | Performed by: INTERNAL MEDICINE

## 2020-03-12 PROCEDURE — 25010000002 ONDANSETRON PER 1 MG: Performed by: INTERNAL MEDICINE

## 2020-03-12 PROCEDURE — 25010000003 POTASSIUM CHLORIDE 10 MEQ/100ML SOLUTION: Performed by: INTERNAL MEDICINE

## 2020-03-12 PROCEDURE — 83735 ASSAY OF MAGNESIUM: CPT | Performed by: INTERNAL MEDICINE

## 2020-03-12 PROCEDURE — 80048 BASIC METABOLIC PNL TOTAL CA: CPT | Performed by: INTERNAL MEDICINE

## 2020-03-12 PROCEDURE — 85027 COMPLETE CBC AUTOMATED: CPT | Performed by: INTERNAL MEDICINE

## 2020-03-12 PROCEDURE — 92523 SPEECH SOUND LANG COMPREHEN: CPT

## 2020-03-12 PROCEDURE — 25010000003 MAGNESIUM SULFATE 4 GM/100ML SOLUTION: Performed by: INTERNAL MEDICINE

## 2020-03-12 PROCEDURE — 99024 POSTOP FOLLOW-UP VISIT: CPT | Performed by: NEUROLOGICAL SURGERY

## 2020-03-12 PROCEDURE — 99233 SBSQ HOSP IP/OBS HIGH 50: CPT | Performed by: INTERNAL MEDICINE

## 2020-03-12 PROCEDURE — 94799 UNLISTED PULMONARY SVC/PX: CPT

## 2020-03-12 RX ORDER — METOPROLOL TARTRATE 5 MG/5ML
5 INJECTION INTRAVENOUS EVERY 6 HOURS SCHEDULED
Status: DISCONTINUED | OUTPATIENT
Start: 2020-03-12 | End: 2020-03-14

## 2020-03-12 RX ADMIN — SODIUM CHLORIDE 5 MG/HR: 9 INJECTION, SOLUTION INTRAVENOUS at 15:51

## 2020-03-12 RX ADMIN — NICOTINE 1 PATCH: 21 PATCH, EXTENDED RELEASE TRANSDERMAL at 08:52

## 2020-03-12 RX ADMIN — SODIUM CHLORIDE 2 MG/HR: 9 INJECTION, SOLUTION INTRAVENOUS at 21:52

## 2020-03-12 RX ADMIN — METOPROLOL TARTRATE 5 MG: 5 INJECTION INTRAVENOUS at 10:14

## 2020-03-12 RX ADMIN — METOPROLOL TARTRATE 5 MG: 5 INJECTION INTRAVENOUS at 17:49

## 2020-03-12 RX ADMIN — ATORVASTATIN CALCIUM 80 MG: 40 TABLET, FILM COATED ORAL at 20:03

## 2020-03-12 RX ADMIN — POTASSIUM CHLORIDE 10 MEQ: 7.46 INJECTION, SOLUTION INTRAVENOUS at 10:14

## 2020-03-12 RX ADMIN — ACETAMINOPHEN 650 MG: 325 TABLET, FILM COATED ORAL at 14:07

## 2020-03-12 RX ADMIN — POTASSIUM CHLORIDE 40 MEQ: 10 CAPSULE, COATED, EXTENDED RELEASE ORAL at 05:11

## 2020-03-12 RX ADMIN — SODIUM CHLORIDE 2.5 MG/HR: 9 INJECTION, SOLUTION INTRAVENOUS at 10:34

## 2020-03-12 RX ADMIN — POTASSIUM CHLORIDE 10 MEQ: 7.46 INJECTION, SOLUTION INTRAVENOUS at 12:07

## 2020-03-12 RX ADMIN — ONDANSETRON 4 MG: 2 INJECTION INTRAMUSCULAR; INTRAVENOUS at 01:52

## 2020-03-12 RX ADMIN — MAGNESIUM SULFATE IN WATER 4 G: 40 INJECTION, SOLUTION INTRAVENOUS at 05:11

## 2020-03-12 RX ADMIN — ACETAMINOPHEN 650 MG: 325 TABLET, FILM COATED ORAL at 20:03

## 2020-03-12 NOTE — NURSING NOTE
1000 Spoke with Dr. Carter at bedside with family. Aware of current neuro status, including worsening NIH (11) and increased lethargy. Discussed CT results with family. Confirmed q1h neuro checks with Dr. Carter. If symptoms continue to worsen, will obtain another CT head this afternoon.

## 2020-03-12 NOTE — PROGRESS NOTES
Head CT reviewed. Large IPH. No surgical intervention at this time. Continue ICU, strict SBP < 140. Hold anticoagulation

## 2020-03-12 NOTE — PLAN OF CARE
Neurointerventional Metrics    Last Known Well:  0610    [facility] Arrival:  1104    Initial NIHSS: 9    Baseline MRS:  0    IV tPA:  Yes, @ [facility]    CT Head: 1120    CT Perfusion: 1121    Arrival to Cath Lab:  1138    Groin Access: 1152    Guide Catheter Placement:  1202    Microcatheter Placement:  1206    Microcatheter Injection:  1208    Stent Retriever Deployment:  1211    Stent Retriever Removal: 1216    Reperfusion:  1229    Final Angiogram:  1230    End of Procedure:  1234    Initial TICI flow:  2A    Final TICI flow: 3     Emboli to New Vascular Territory:  No

## 2020-03-12 NOTE — THERAPY RE-EVALUATION
Acute Care - Speech Language Pathology Initial Evaluation   [facility]     Patient Name: Maria uLz Wade  : 1945  MRN: [id number]  Today's Date: 3/12/2020               Admit Date: 3/11/2020     Visit Dx:    ICD-10-CM ICD-9-CM   1. Dysphagia, unspecified type R13.10 787.20   2. Cognitive communication deficit R41.841 799.52     Patient Active Problem List   Diagnosis   • Dizziness   • PVC (premature ventricular contraction)   • Smoker   • Palpitations   • Dyslipidemia on statin    • Essential hypertension   • R MCA AIS s/p tPA and thrombectomy    • Tissue plasminogen activator (tPA) administered at other facility within 24 hours prior to current admission     Past Medical History:   Diagnosis Date   • History of tonsillectomy    • History of tubal ligation    • Hyperlipidemia      Past Surgical History:   Procedure Laterality Date   • CARDIOVASCULAR STRESS TEST  2017    L.Myoview- R/O Anterior Ischemia.   • CONVERTED (HISTORICAL) HOLTER  2020    AVG 77. . 6.8% PVC. one 6 beats run of V-Tach   • ECHO - CONVERTED  2017    EF 65%. Mild-Mod MR   • INTERVENTIONAL RADIOLOGY PROCEDURE Bilateral 3/11/2020    Procedure: CAROTID CEREBRAL ANGIOGRAM BILATERAL;  Surgeon: Adryan Carter MD;  Location: formerly [facility] INVASIVE LOCATION;  Service: Interventional Radiology;  Laterality: Bilateral;   • OTHER SURGICAL HISTORY      CT Scan- 60% (L) ICA. 50% (R) ICA   • US CAROTID UNILATERAL  2017    @ [facility]. No sig stenosis        SLP EVALUATION (last 72 hours)      SLP SLC Evaluation     Row Name 20 0930                   Cognitive Assessment Intervention- SLP    Cognitive Function (Cognition)  unable/difficult to assess  -SM        Attention (Cognitive)  selective;sustained;severe impairment  -        Cognition, Comment  Cannot sustain alertness for full assessment.   -           SLP Clinical Impressions    SLP Diagnosis  Lethargy/poor sustained attention  -        SLC  Criteria for Skilled Therapy Interventions Met  other (see comments) will check back for ? further needs  -        Functional Impact  difficulty communicating wants, needs  -           Recommendations    Therapy Frequency (SLP SLC)  PRN  -          User Key  (r) = Recorded By, (t) = Taken By, (c) = Cosigned By    Initials Name Effective Dates    Yola Álvarez MS CCC-SLP 06/22/15 -              EDUCATION  The patient has been educated in the following areas:     Cognitive Impairment Communication Impairment.    SLP Recommendation and Plan  SLP Diagnosis: Lethargy/poor sustained attention     SLC Criteria for Skilled Therapy Interventions Met: other (see comments)(will check back for ? further needs)             Plan of Care Reviewed With: patient, family                  Time Calculation:     Time Calculation- SLP     Row Name 20 1156             Time Calculation- SLP    SLP Start Time  930  -      SLP Received On  20  -        User Key  (r) = Recorded By, (t) = Taken By, (c) = Cosigned By    Initials Name Provider Type    Yola Álvarez MS CCC-SLP Speech and Language Pathologist          Therapy Charges for Today     Code Description Service Date Service Provider Modifiers Qty    74992022469 HC ST EVAL ORAL PHARYNG SWALLOW 3 3/12/2020 Yola Willoughby MS CCC-SLP GN 1    29380968167 HC ST EVAL SPEECH AND PROD W LANG  1 3/12/2020 Yola Willoughby MS CCC-SLP GN 1                     Yola ISLAS. MS Case CCC-SLP  3/12/2020 and Acute Care - Speech Language Pathology   Swallow Re-Evaluation [facility]     Patient Name: Maria Luz Wade  : 1945  MRN: [id number]  Today's Date: 3/12/2020               Admit Date: 3/11/2020    Visit Dx:     ICD-10-CM ICD-9-CM   1. Dysphagia, unspecified type R13.10 787.20   2. Cognitive communication deficit R41.841 799.52     Patient Active Problem List   Diagnosis   • Dizziness   • PVC (premature ventricular contraction)    • Smoker   • Palpitations   • Dyslipidemia on statin    • Essential hypertension   • R MCA AIS s/p tPA and thrombectomy    • Tissue plasminogen activator (tPA) administered at other facility within 24 hours prior to current admission     Past Medical History:   Diagnosis Date   • History of tonsillectomy 1951   • History of tubal ligation    • Hyperlipidemia      Past Surgical History:   Procedure Laterality Date   • CARDIOVASCULAR STRESS TEST  11/30/2017    L.Myoview- R/O Anterior Ischemia.   • CONVERTED (HISTORICAL) HOLTER  02/12/2020    AVG 77. . 6.8% PVC. one 6 beats run of V-Tach   • ECHO - CONVERTED  11/30/2017    EF 65%. Mild-Mod MR   • INTERVENTIONAL RADIOLOGY PROCEDURE Bilateral 3/11/2020    Procedure: CAROTID CEREBRAL ANGIOGRAM BILATERAL;  Surgeon: Adryan Carter MD;  Location: [facility] INVASIVE LOCATION;  Service: Interventional Radiology;  Laterality: Bilateral;   • OTHER SURGICAL HISTORY  2015    CT Scan- 60% (L) ICA. 50% (R) ICA   • US CAROTID UNILATERAL  11/16/2017    @ [facility]. No sig stenosis        SWALLOW EVALUATION (last 72 hours)      SLP Adult Swallow Evaluation     Row Name 03/12/20 0930 03/11/20 1400                Rehab Evaluation    Document Type  re-evaluation  -  evaluation  -AV       Subjective Information  --  no complaints  -AV       Patient Observations  lethargic  -  alert;cooperative  -AV       Patient/Family Observations  Family present  -  no family present   -AV       Patient Effort  --  good  -AV       Comment  Eval performed with SA  -  --          General Information    Patient Profile Reviewed  --  yes  -AV       Pertinent History Of Current Problem  RN consulted after family noted coughing with intake.   -  CVA post TPA and thrombectomy  -AV       Current Method of Nutrition  regular textures;thin liquids though holding trays  -  NPO  -AV       Precautions/Limitations, Vision  --  WFL;for purposes of eval  -AV       Precautions/Limitations,  Hearing  --  WFL;for purposes of eval  -AV       Prior Level of Function-Communication  --  WFL  -AV       Prior Level of Function-Swallowing  --  no diet consistency restrictions  -AV       Plans/Goals Discussed with  family  -  patient  -AV       Barriers to Rehab  medically complex  -  medically complex  -AV       Patient's Goals for Discharge  patient could not state  -  return to PO diet  -AV       Family Goals for Discharge  patient able to eat/drink without coughing/choking  -  --          Pain Assessment    Additional Documentation  --  Pain Scale: FACES Pre/Post-Treatment (Group)  -AV          Pain Scale: FACES Pre/Post-Treatment    Pain: FACES Scale, Pretreatment  0-->no hurt  -SM  0-->no hurt  -AV       Pain: FACES Scale, Post-Treatment  0-->no hurt  -SM  0-->no hurt  -AV          Oral Motor and Function    Dentition Assessment  --  edentulous, dentures not available  -AV       Secretion Management  --  dried secretions in oral cavity  -AV       Mucosal Quality  --  dry  -AV       Volitional Swallow  --  WFL  -AV       Volitional Cough  --  WFL  -AV          Oral Musculature and Cranial Nerve Assessment    Oral Motor General Assessment  unable to assess  -  oral labial or buccal impairment  -AV          General Eating/Swallowing Observations    Respiratory Support Currently in Use  --  nasal cannula  -AV       Eating/Swallowing Skills  --  fed by SLP  -AV       Positioning During Eating  --  upright in bed  -AV       Utensils Used  --  spoon;cup;straw  -AV       Consistencies Trialed  --  regular textures;pureed;thin liquids  -AV          Clinical Swallow Eval    Oral Prep Phase  --  WFL  -AV       Oral Transit  --  WFL  -AV       Oral Residue  --  WFL  -AV       Pharyngeal Phase  --  no overt signs/symptoms of pharyngeal impairment  -AV       Esophageal Phase  --  unremarkable  -AV       Clinical Swallow Evaluation Summary  could not alert pt enough for safe PO intake. Alerted for a few seconds  at a time with max cueing. Following assessment, RN informed pt has had a chnage in status with new large bleed. MD has made pt NPO.  -  RADHA completed this pm: no overt s/s with trials at bedside. Safe for reg and thins at this time. Will follow up tomorrow for SLC eval.   -AV          Clinical Impression    SLP Swallowing Diagnosis  --  functional oral phase;functional pharyngeal phase  -AV       Functional Impact  --  no impact on function  -AV       Rehab Potential/Prognosis, Swallowing  --  good, to achieve stated therapy goals  -AV       Swallow Criteria for Skilled Therapeutic Interventions Met  --  baseline status  -AV          Recommendations    Therapy Frequency (Swallow)  --  evaluation only  -AV       SLP Diet Recommendation  NPO  -  regular textures;thin liquids  -AV       Recommended Diagnostics  other (see comments) will check back tomorrow for status  -  --       SLP Rec. for Method of Medication Administration  -- as per MD  -  meds whole;as tolerated  -AV       Monitor for Signs of Aspiration  --  yes;notify SLP if any concerns  -AV       Anticipated Dischage Disposition  --  unknown;anticipate therapy at next level of care  -AV         User Key  (r) = Recorded By, (t) = Taken By, (c) = Cosigned By    Initials Name Effective Dates     Yola Willoughby, MS CCC-SLP 06/22/15 -     AV Esmer Valdes MS CCC-SLP 05/23/19 -           EDUCATION  The patient has been educated in the following areas:   Dysphagia (Swallowing Impairment) NPO rationale.    SLP Recommendation and Plan     SLP Diet Recommendation: NPO     SLP Rec. for Method of Medication Administration: (as per MD)        Recommended Diagnostics: other (see comments)(will check back tomorrow for status)                      Plan of Care Reviewed With: patient, family           Time Calculation:   Time Calculation- SLP     Row Name 03/12/20 9303             Time Calculation- SLP    SLP Start Time  0930  -      SLP  Received On  03/12/20  -        User Key  (r) = Recorded By, (t) = Taken By, (c) = Cosigned By    Initials Name Provider Type    Yola Álvarez MS CCC-SLP Speech and Language Pathologist          Therapy Charges for Today     Code Description Service Date Service Provider Modifiers Qty    58039620459 HC ST EVAL ORAL PHARYNG SWALLOW 3 3/12/2020 Yola Willoughby MS CCC-SLP GN 1    [id number] HC ST EVAL SPEECH AND PROD W LANG  1 3/12/2020 Yola Willoughby MS CCC-SLP GN 1               Yola Willoughby MS CCC-SLP  3/12/2020

## 2020-03-12 NOTE — PROGRESS NOTES
"Clinical Nutrition Note      Patient Name: Maria Luz Wade  MRN: [id number]  Admission date: 3/11/2020      Multidisciplinary Rounds    Additional information obtained during MDR:  RN reports pt adm w/ CVA s/p tpa  and thrombectomy, she had a decline in neuro status overnight and CT of head demonstrates a hemorrhagic conversion. Pt is not able to eat diet will ask for SLP re-eval; currently pt is very drowsy and needs to be NPO. Dr. Carter told family she is not a surgical candidate.    Current diet: NPO Diet    Oral Nutrition Supplement:     Pertinent medical data reviewed  No nutrition risk identified on nursing screen; MST score \"0\"    Intervention:  Plan of care and goals reviewed    Monitor:  RD to follow per protocol      Lali Caberra MS,RD,LD  03/12/20 11:28 AM  Time: 15  mins       "

## 2020-03-12 NOTE — PLAN OF CARE
Problem: Patient Care Overview  Goal: Plan of Care Review  Flowsheets (Taken 3/12/2020 075)  Progress: no change  Plan of Care Reviewed With: patient  Outcome Summary: PtMelvin RAND was a 6 at the start of the shift. She remains lethargic arousing to voice and easily falling back asleep. She is oriented x4 and PIKE, with a slight drift in her left arm at times. Her left arm demonstrates some ataxia as well. Left facial droop still present. Cardene titrated to keep SBP <140. VSS otherwise stable. Electrolytes replaced. UO adequate.

## 2020-03-12 NOTE — PROGRESS NOTES
Subjective: Postoperative day 1 status post TPA and right MCA thrombectomy.  Patient suffered a neurological decline this morning.  A noncontrast head CT unfortunately demonstrated a large R frontotemporal IPH.    Objective:    Vitals:    20 1130   BP: 128/68   Pulse: 81   Resp:    Temp:    SpO2: 96%     Pulse  Av.3  Min: 79  Max: 101  Systolic (24hrs), Av , Min:102 , Max:167     Diastolic (24hrs), Av, Min:47, Max:106    Temp (24hrs), Av.7 °F (36.5 °C), Min:97.5 °F (36.4 °C), Max:97.9 °F (36.6 °C)      Is somnolent and difficult to arouse.  She has flaccid left hemiplegia.  This represents a significant clinical change in comparison to yesterday.    A noncontrast head CT demonstrates a approximately 5 x 3-1/2 cm hematoma situated in the right sylvian fissure extending into the temporal lobe, basal ganglia, and right frontal lobe.  The mesencephalic cisterns are patent    Lab Results   Component Value Date     2020       A/P:   A lengthy and protracted conversation was held with the patient's family regarding her poor prognosis.  Given her age and medical comorbidities, she is at risk for fatal conversion of this hemorrhage.  We will keep her in the intensive care unit with aggressive medical management.  I do not think she is a surgical candidate at this point.  The patient's family indicated that they would not want to intubate her and are leaning towards palliative measures at this point.    A determination as to whether this can be considered a symptomatic ICH cannot be made at this time.

## 2020-03-12 NOTE — PLAN OF CARE
Problem: Patient Care Overview  Goal: Plan of Care Review  Outcome: Ongoing (interventions implemented as appropriate)  Flowsheets (Taken 3/12/2020 8421)  Plan of Care Reviewed With: patient; family  Note:   SLP re-evaluation completed. Will continue to address dysphagia. Please see note for further details and recommendations.

## 2020-03-12 NOTE — CONSULTS
Diabetes Education    Patient Name:  Maria Luz Wade  YOB: 1945  MRN: [id number]  Admit Date:  3/11/2020        Order criteria not met for diabetes education consult. Current A1c is 5.6, noted during chart review no history of DM and no home meds for DM. Thank you.      Electronically signed by:  Humera Roy RN  03/12/20 09:22

## 2020-03-12 NOTE — PLAN OF CARE
Problem: Patient Care Overview  Goal: Plan of Care Review  Outcome: Ongoing (interventions implemented as appropriate)  Flowsheets (Taken 3/12/2020 3320)  Plan of Care Reviewed With: patient; family  Note:   SLP re-evaluation completed. Will monitor status for ? Any further needs. Please see note for further details and recommendations.

## 2020-03-12 NOTE — THERAPY RE-EVALUATION
Acute Care - Speech Language Pathology   Swallow Re-Evaluation [facility]     Patient Name: Maria Luz Wade  : 1945  MRN: [id number]  Today's Date: 3/12/2020               Admit Date: 3/11/2020    Visit Dx:     ICD-10-CM ICD-9-CM   1. Dysphagia, unspecified type R13.10 787.20   2. Cognitive communication deficit R41.841 799.52     Patient Active Problem List   Diagnosis   • Dizziness   • PVC (premature ventricular contraction)   • Smoker   • Palpitations   • Dyslipidemia on statin    • Essential hypertension   • R MCA AIS s/p tPA and thrombectomy    • Tissue plasminogen activator (tPA) administered at other facility within 24 hours prior to current admission   • Intracranial bleed (CMS/HCC); hemorrhagic conversion of an ischemic stroke     Past Medical History:   Diagnosis Date   • History of tonsillectomy    • History of tubal ligation    • Hyperlipidemia      Past Surgical History:   Procedure Laterality Date   • CARDIOVASCULAR STRESS TEST  2017    L.Myoview- R/O Anterior Ischemia.   • CONVERTED (HISTORICAL) HOLTER  2020    AVG 77. . 6.8% PVC. one 6 beats run of V-Tach   • ECHO - CONVERTED  2017    EF 65%. Mild-Mod MR   • INTERVENTIONAL RADIOLOGY PROCEDURE Bilateral 3/11/2020    Procedure: CAROTID CEREBRAL ANGIOGRAM BILATERAL;  Surgeon: Adryan Carter MD;  Location: [facility] INVASIVE LOCATION;  Service: Interventional Radiology;  Laterality: Bilateral;   • OTHER SURGICAL HISTORY      CT Scan- 60% (L) ICA. 50% (R) ICA   • US CAROTID UNILATERAL  2017    @ [facility]. No sig stenosis        SWALLOW EVALUATION (last 72 hours)      SLP Adult Swallow Evaluation     Row Name 20 1345 20 0930 20 1400             Rehab Evaluation    Document Type  evaluation  -SA  re-evaluation  -SM  evaluation  -AV      Subjective Information  no complaints  -SA  --  no complaints  -AV      Patient Observations  alert;cooperative  -SA  lethargic  -SM   alert;cooperative  -AV      Patient/Family Observations  Family stated alertness fluctuation throughout day, however, presented as much more alert during eval when compared to previous SLP attempt.   -  Family present  -  no family present   -AV      Patient Effort  good  -  --  good  -AV      Comment  --  Eval performed with [facility]  --         General Information    Patient Profile Reviewed  yes  -  --  yes  -AV      Pertinent History Of Current Problem  CVA post TPA and thrombectomy; RN notified SLP about improvement in medical status     Completed in collaboration with   -  RN consulted after family noted coughing with intake.   -  CVA post TPA and thrombectomy  -AV      Current Method of Nutrition  NPO  -  regular textures;thin liquids though holding trays  -  NPO  -AV      Precautions/Limitations, Vision  WFL;for purposes of eval  -  --  WFL;for purposes of eval  -AV      Precautions/Limitations, Hearing  WFL;for purposes of eval  -  --  WFL;for purposes of eval  -AV      Prior Level of Function-Communication  --  --  WFL  -AV      Prior Level of Function-Swallowing  no diet consistency restrictions  -  --  no diet consistency restrictions  -AV      Plans/Goals Discussed with  patient and family;agreed upon  -  family  -  patient  -AV      Barriers to Rehab  --  medically complex  -  medically complex  -AV      Patient's Goals for Discharge  patient did not state  -  patient could not state  -  return to PO diet  -      Family Goals for Discharge  family did not state  -  patient able to eat/drink without coughing/choking  -  --         Pain Assessment    Additional Documentation  Pain Scale: Numbers Pre/Post-Treatment (Group)  [facility]  --  Pain Scale: FACES Pre/Post-Treatment (Group)  -         Pain Scale: Numbers Pre/Post-Treatment    Pain Scale: Numbers, Post-Treatment  8/10  -  --  --      Pain Location  head  -  --  --      Pre/Post Treatment Pain Comment  Pt  stated increased presssure on right side of head- notified RN   -SA  --  --         Pain Scale: FACES Pre/Post-Treatment    Pain: FACES Scale, Pretreatment  --  0-->no hurt  -SM  0-->no hurt  -AV      Pain: FACES Scale, Post-Treatment  --  0-->no hurt  -SM  0-->no hurt  -AV         Oral Motor and Function    Dentition Assessment  upper dentures/partial in place;lower dentures/partial in place  -SA  --  edentulous, dentures not available  -AV      Secretion Management  --  --  dried secretions in oral cavity  -AV      Mucosal Quality  --  --  dry  -AV      Volitional Swallow  --  --  WFL  -AV      Volitional Cough  --  --  WFL  -AV         Oral Musculature and Cranial Nerve Assessment    Oral Motor General Assessment  [facility]  -  unable to assess  -  oral labial or buccal impairment  -AV         General Eating/Swallowing Observations    Respiratory Support Currently in Use  --  --  nasal cannula  -AV      Eating/Swallowing Skills  --  --  fed by SLP  -AV      Positioning During Eating  --  --  upright in bed  -AV      Utensils Used  --  --  spoon;cup;straw  -AV      Consistencies Trialed  --  --  regular textures;pureed;thin liquids  -AV         Clinical Swallow Eval    Oral Prep Phase  impaired  -SA  --  WFL  -AV      Oral Transit  --  -SA  --  WFL  -AV      Oral Residue  --  --  WFL  -AV      Pharyngeal Phase  suspected pharyngeal impairment  -SA  --  no overt signs/symptoms of pharyngeal impairment  -AV      Esophageal Phase  --  --  unremarkable  -AV      Clinical Swallow Evaluation Summary  Pt seen for clinical swallow re-evaluation d/t to change in status from last CSE on 3/12. Attempted to see pt for re-eval earlier during the day, but pt not alert enough for PO. RN notified SLP about improvement in alertness. Clinical re-eval completed. Pt given trials of ice chips x1, tsp thin x1, nectar thick liquid via tsp/cup/straw, puree, and regular solid. Cough w/ ice chip and thin liquid. No s/sxs w/ all trials of  nectar and puree. Pocketing w/ solid regular that was cleared after liquid wash x2. Rec soft solids w/ nectar thick liquids. Re-assess at bedside tomorrow before instrumental to further assess swallow function.   -SA  could not alert pt enough for safe PO intake. Alerted for a few seconds at a time with max cueing. Following assessment, RN informed pt has had a chnage in status with new large bleed. MD has made pt NPO.  -SM  CSE completed this pm: no overt s/s with trials at bedside. Safe for reg and thins at this time. Will follow up tomorrow for SLC eval.   -AV         Oral Prep Concerns    Oral Prep Concerns  prolonged mastication Pocketing that cleared w/ liquid wash x2   -SA  --  --      Prolonged Mastication  regular consistencies  -SA  --  --         Pharyngeal Phase Concerns    Pharyngeal Phase Concerns  cough  -SA  --  --      Cough  thin  -SA  --  --         MBS/VFSS    Utensils Used  spoon;cup;straw  -SA  --  --         Clinical Impression    SLP Swallowing Diagnosis  suspected pharyngeal dysfunction  -SA  --  functional oral phase;functional pharyngeal phase  -AV      Functional Impact  risk of aspiration/pneumonia  -SA  --  no impact on function  -AV      Rehab Potential/Prognosis, Swallowing  good, to achieve stated therapy goals  -SA  --  good, to achieve stated therapy goals  -AV      Swallow Criteria for Skilled Therapeutic Interventions Met  demonstrates skilled criteria  -SA  --  baseline status  -AV         Recommendations    Therapy Frequency (Swallow)  evaluation only  -SA  --  evaluation only  -AV      Predicted Duration Therapy Intervention (Days)  until discharge  -SA  --  --      SLP Diet Recommendation  mechanical soft with no mixed consistencies;nectar thick liquids  -SA  NPO  -SM  regular textures;thin liquids  -AV      Recommended Diagnostics  FEES  -SA  other (see comments) will check back tomorrow for status  -SM  --      Recommended Precautions and Strategies  upright posture  during/after eating;small bites of food and sips of liquid  -SA  --  --      SLP Rec. for Method of Medication Administration  meds whole;with pudding or applesauce;as tolerated  -SA  -- as per MD PAULINO  meds whole;as tolerated  -AV      Monitor for Signs of Aspiration  yes;notify SLP if any concerns  -SA  --  yes;notify SLP if any concerns  -AV      Anticipated Dischage Disposition  unknown;anticipate therapy at next level of care  -SA  --  unknown;anticipate therapy at next level of care  -AV        User Key  (r) = Recorded By, (t) = Taken By, (c) = Cosigned By    Initials Name Effective Dates    Yola Álvarez, MS CCC-SLP 06/22/15 -     Esmer Stout, MS CCC-SLP 05/23/19 -     Inés Couch, SLP 02/25/20 -           EDUCATION  The patient has been educated in the following areas:   Dysphagia (Swallowing Impairment).    SLP Recommendation and Plan  SLP Swallowing Diagnosis: suspected pharyngeal dysfunction  SLP Diet Recommendation: mechanical soft with no mixed consistencies, nectar thick liquids  Recommended Precautions and Strategies: upright posture during/after eating, small bites of food and sips of liquid  SLP Rec. for Method of Medication Administration: meds whole, with pudding or applesauce, as tolerated     Monitor for Signs of Aspiration: yes, notify SLP if any concerns  Recommended Diagnostics: FEES  Swallow Criteria for Skilled Therapeutic Interventions Met: demonstrates skilled criteria  Anticipated Dischage Disposition: unknown, anticipate therapy at next level of care  Rehab Potential/Prognosis, Swallowing: good, to achieve stated therapy goals  Therapy Frequency (Swallow): evaluation only  Predicted Duration Therapy Intervention (Days): until discharge       Plan of Care Reviewed With: patient, family           Time Calculation:   Time Calculation- SLP     Row Name 03/12/20 1425 03/12/20 9126          Time Calculation- [facility]    SLP Start Time  1345  -  0930  -BRENDAN      SLP Received On  03/12/20  -  03/12/20  -BRENDAN       User Key  (r) = Recorded By, (t) = Taken By, (c) = Cosigned By    Initials Name Provider Type    Yola Álvarez MS CCC-SLP Speech and Language Pathologist    Inés Couch SLP Speech and Language Pathologist          Therapy Charges for Today     Code Description Service Date Service Provider Modifiers Qty    [id number] HC ST EVAL ORAL PHARYNG SWALLOW 3 3/12/2020 Inés Hair, TED GN 1               TED Renteria  3/12/2020

## 2020-03-12 NOTE — PROGRESS NOTES
"Critical Care Note     LOS: 1 day   Patient Care Team:  Sameera Davenport APRN as PCP - General (Family Medicine)    Chief Complaint/Reason for visit:      Right middle cerebral artery stroke  Hemorrhagic conversion  History of nonsustained V. Tach  Probable COPD      Subjective     74-year-old woman presenting on March 11 with left-sided weakness and dysarthria.  She received TPA.  Perfusion scan showed occlusion of her right middle cerebral artery branch and she underwent thrombectomy.  Postprocedure her motor weakness resolved.  She had some persistent facial weakness.    Interval History:     This morning she was more lethargic.  She could still arouse and follow commands with all extremities.  NIH stroke score was an 8.  She had some dry heaves and a headache.  Therefore she underwent a CT scan which revealed hemorrhagic conversion with bleeding into the temporal lobe, basal ganglia, frontal lobe on the right.  Neurosurgery indicates that this is associated with a poor prognosis and risk for further bleeding.    Review of Systems:    All systems were reviewed and negative except as noted in subjective.    Medical history, surgical history, social history, family history reviewed    Objective     Intake/Output:    Intake/Output Summary (Last 24 hours) at 3/12/2020 1409  Last data filed at 3/12/2020 1200  Gross per 24 hour   Intake 1696.7 ml   Output 1350 ml   Net 346.7 ml       Nutrition:  Diet Dysphagia; IV - Mechanical Soft No Mixed Consistencies; Nectar / Syrup Thick; Cardiac, Consistent Carbohydrate    Infusions:    niCARdipine 5-15 mg/hr Last Rate: 5 mg/hr (03/12/20 1115)       Telemetry: Sinus rhythm             Vital Signs  Blood pressure 124/69, pulse 86, temperature 97.7 °F (36.5 °C), temperature source Oral, resp. rate 22, height 160 cm (63\"), weight 52.6 kg (115 lb 15.4 oz), SpO2 93 %.    Physical Exam:  General Appearance:   Older white woman, lethargic but arousable   Head:   Atraumatic   Eyes:   "        Pupils reactive to light   Ears:     Throat:  Oral mucosa moist   Neck:  Trachea midline, no palpable thyroid   Back:      Lungs:    Symmetric chest expansion.  Breath sounds are bilateral without wheeze or rhonchi    Heart:   Regular rhythm, S1, S2 auscultated, no murmur   Abdomen:    Bowel sounds present, soft, nontender   Rectal:   Deferred   Extremities:  No pitting edema or clubbing   Pulses:    Skin:  Warm and dry   Lymph nodes:    Neurologic:  Drowsy but does arouse with stimuli, right facial droop, aphasia, follows commands with all extremities.      Results Review:     I reviewed the patient's new clinical results.   Results from last 7 days   Lab Units 03/12/20  0329   SODIUM mmol/L 138   POTASSIUM mmol/L 3.1*   CHLORIDE mmol/L 104   CO2 mmol/L 20.0*   BUN mg/dL 8   CREATININE mg/dL 0.46*   CALCIUM mg/dL 8.8   GLUCOSE mg/dL 109*     Results from last 7 days   Lab Units 03/12/20  0328   WBC 10*3/mm3 11.87*   HEMOGLOBIN g/dL 13.9   HEMATOCRIT % 44.9   PLATELETS 10*3/mm3 227         No results found for: BLOODCX  No results found for: URINECX    I reviewed the patient's new imaging including images and reports.    COMPARISON: CT head dated 03/11/2020, CT cerebral perfusion 03/11/2020  at 1117 hours     FINDINGS: Interval development of a right frontotemporal  intraparenchymal hemorrhage measuring maximum transaxial diameter 5.0 x  3.6 cm with surrounding vasogenic edema and effacement of the right  lateral ventricle without midline shift. Trace subarachnoid hemorrhage  adjacent on the anterior margin without distinct intraventricular  hemorrhage identified. Globes and orbits unremarkable. Visualized  paranasal sinuses and mastoid air cells are grossly clear and  well-pneumatized.     ICH Volume is >30 ml: No (approximately 21 mL)     Intraventricular Hemorrhage: No     Infratentorial Origin of Hemorrhage: No     IMPRESSION:  Interval development of a right frontotemporal  intraparenchymal hemorrhage  with surrounding scattered subarachnoid  hemorrhage and vasogenic edema producing effacement of the right lateral  ventricle without midline shift.     ICH Volume is >30 ml: No (approximately 21 mL)     Intraventricular Hemorrhage: No     Infratentorial Origin of Hemorrhage: No     Findings were relayed to Dr. Carter via phone on 03/12/2020 at 0835  Hours.    All medications reviewed.     aspirin 325 mg Oral Daily   aspirin 300 mg Rectal Daily   atorvastatin 80 mg Oral Nightly   metoprolol tartrate 5 mg Intravenous Q6H   nicotine 1 patch Transdermal Q24H         Assessment/Plan       R MCA AIS s/p tPA and thrombectomy     Tissue plasminogen activator (tPA) administered at other facility within 24 hours prior to current admission    Smoker    Dyslipidemia on statin     Essential hypertension    #1 ischemic stroke, right middle cerebral artery status post TPA and thrombectomy.  This morning she was more lethargic, NIH scale was increased to an 8 compared to a 5.  CT scan confirmed hemorrhagic conversion.  While she is more somnolent, her right-sided weakness has not recurred.  She has persistent dysarthria.  She was able to cooperate with speech therapy and passed her swallow evaluation with a dysphagia 4 diet.    #2 hypertension changed to IV Lopressor because of her decreased alertness.  Was taking Norvasc, sotalol as an outpatient.    #3 history of tobacco use, currently without wheezing.  Oxygenation is adequate on 2 L nasal cannula    PLAN:  Monitor neuro exam  Initiate diet prescribed by speech pathology  Aspirin, Plavix  Leave IV Lopressor for now  Cardene 5 mg/h  Dr. Carter did discuss CODE STATUS with the ominous finding of hemorrhagic conversion.  In his note he reports family is leaning toward no intubation and possible palliative care but CODE STATUS was not changed.  I will give the family some time to digest this news and reintroduce CODE STATUS this afternoon    VTE Prophylaxis:SCDS    Stress Ulcer  Prophylaxis: None    Lenora Coto MD  03/12/20  14:09      Time: 25min  I personally provided care to this critically ill patient as documented above.  Critical care time does not include time spent on separately billed procedures.  Non of my critical care time was concurrent with other critical care providers.

## 2020-03-12 NOTE — PLAN OF CARE
Problem: Skin Injury Risk (Adult)  Goal: Identify Related Risk Factors and Signs and Symptoms  Outcome: Ongoing (interventions implemented as appropriate)  Goal: Skin Health and Integrity  Outcome: Ongoing (interventions implemented as appropriate)     Problem: Patient Care Overview  Goal: Plan of Care Review  Outcome: Ongoing (interventions implemented as appropriate)  Flowsheets (Taken 3/12/2020 0810)  Progress: improving  Plan of Care Reviewed With: patient; family  Note:   NIH 8 this morning. Very lethargic, left facial droop, LUE drift, dysarthria, ataxia, absent sensation on left sided extremities. STAT CT head ordered this morning by Dr. Carter. Showed new ICH. Came to bedside at 1000 to discuss with family. Aware that patient became more lethargic at that time and would only arouse to sternal rub. Discussed poor prognosis with family and that is she continues to decline we will order CT this afternoon. However, if she remains stable, will get one in am. Around 1200, patient now alert, dysarthria improved. Passed SLP evaluation. Remains q1h neuro checks. HR 80s-100s with frequent PVCs. Mg+ replaced, recheck in am. K+ replaced. Recheck 4.2. Dr. Coto did not want to consult cardiology at this time. Sotalol changed to IV lopressor. SBP 100s-150s. Cardene as needed. Keep <140. ASA held at this time due to new CT results. Tolerating 3L NC. UOP adequate. Patient headache controlled with prn tylenol. Outlying IVs replaced per protocol. Family updated on plan of care.   Goal: Individualization and Mutuality  Outcome: Ongoing (interventions implemented as appropriate)  Goal: Discharge Needs Assessment  Outcome: Ongoing (interventions implemented as appropriate)  Goal: Interprofessional Rounds/Family Conf  Outcome: Ongoing (interventions implemented as appropriate)     Problem: Stroke (Ischemic) (Adult)  Goal: Signs and Symptoms of Listed Potential Problems Will be Absent, Minimized or Managed  (Stroke)  Outcome: Ongoing (interventions implemented as appropriate)     Problem: Thrombolytic Therapy (Adult)  Goal: Signs and Symptoms of Listed Potential Problems Will be Absent, Minimized or Managed (Thrombolytic Therapy)  Outcome: Ongoing (interventions implemented as appropriate)

## 2020-03-13 ENCOUNTER — ANCILLARY PROCEDURE (OUTPATIENT)
Dept: SPEECH THERAPY | Facility: HOSPITAL | Age: 75
End: 2020-03-13

## 2020-03-13 ENCOUNTER — APPOINTMENT (OUTPATIENT)
Dept: CT IMAGING | Facility: HOSPITAL | Age: 75
End: 2020-03-13

## 2020-03-13 LAB — MAGNESIUM SERPL-MCNC: 2.2 MG/DL (ref 1.6–2.4)

## 2020-03-13 PROCEDURE — 92612 ENDOSCOPY SWALLOW (FEES) VID: CPT

## 2020-03-13 PROCEDURE — 94799 UNLISTED PULMONARY SVC/PX: CPT

## 2020-03-13 PROCEDURE — 94640 AIRWAY INHALATION TREATMENT: CPT

## 2020-03-13 PROCEDURE — 99024 POSTOP FOLLOW-UP VISIT: CPT | Performed by: NEUROLOGICAL SURGERY

## 2020-03-13 PROCEDURE — 83735 ASSAY OF MAGNESIUM: CPT | Performed by: INTERNAL MEDICINE

## 2020-03-13 PROCEDURE — 70450 CT HEAD/BRAIN W/O DYE: CPT

## 2020-03-13 PROCEDURE — 97165 OT EVAL LOW COMPLEX 30 MIN: CPT

## 2020-03-13 PROCEDURE — 92610 EVALUATE SWALLOWING FUNCTION: CPT

## 2020-03-13 PROCEDURE — 99232 SBSQ HOSP IP/OBS MODERATE 35: CPT | Performed by: INTERNAL MEDICINE

## 2020-03-13 PROCEDURE — 97162 PT EVAL MOD COMPLEX 30 MIN: CPT

## 2020-03-13 PROCEDURE — 97535 SELF CARE MNGMENT TRAINING: CPT

## 2020-03-13 RX ORDER — IPRATROPIUM BROMIDE AND ALBUTEROL SULFATE 2.5; .5 MG/3ML; MG/3ML
3 SOLUTION RESPIRATORY (INHALATION)
Status: DISCONTINUED | OUTPATIENT
Start: 2020-03-13 | End: 2020-03-14

## 2020-03-13 RX ORDER — ACETAMINOPHEN 325 MG/1
650 TABLET ORAL EVERY 4 HOURS PRN
Status: DISCONTINUED | OUTPATIENT
Start: 2020-03-13 | End: 2020-03-16 | Stop reason: HOSPADM

## 2020-03-13 RX ADMIN — ACETAMINOPHEN 650 MG: 325 TABLET, FILM COATED ORAL at 01:53

## 2020-03-13 RX ADMIN — IPRATROPIUM BROMIDE AND ALBUTEROL SULFATE 3 ML: 2.5; .5 SOLUTION RESPIRATORY (INHALATION) at 20:24

## 2020-03-13 RX ADMIN — IPRATROPIUM BROMIDE AND ALBUTEROL SULFATE 3 ML: 2.5; .5 SOLUTION RESPIRATORY (INHALATION) at 12:03

## 2020-03-13 RX ADMIN — METOPROLOL TARTRATE 5 MG: 5 INJECTION INTRAVENOUS at 17:36

## 2020-03-13 RX ADMIN — ACETAMINOPHEN 650 MG: 325 TABLET, FILM COATED ORAL at 12:31

## 2020-03-13 RX ADMIN — NICOTINE 1 PATCH: 21 PATCH, EXTENDED RELEASE TRANSDERMAL at 08:17

## 2020-03-13 RX ADMIN — IPRATROPIUM BROMIDE AND ALBUTEROL SULFATE 3 ML: 2.5; .5 SOLUTION RESPIRATORY (INHALATION) at 16:13

## 2020-03-13 RX ADMIN — METOPROLOL TARTRATE 5 MG: 5 INJECTION INTRAVENOUS at 00:11

## 2020-03-13 RX ADMIN — ACETAMINOPHEN 650 MG: 325 TABLET, FILM COATED ORAL at 08:11

## 2020-03-13 RX ADMIN — METOPROLOL TARTRATE 5 MG: 5 INJECTION INTRAVENOUS at 06:23

## 2020-03-13 RX ADMIN — ACETAMINOPHEN 650 MG: 325 TABLET, FILM COATED ORAL at 20:03

## 2020-03-13 RX ADMIN — ASPIRIN 325 MG: 325 TABLET, COATED ORAL at 08:11

## 2020-03-13 RX ADMIN — ATORVASTATIN CALCIUM 80 MG: 40 TABLET, FILM COATED ORAL at 20:03

## 2020-03-13 RX ADMIN — METOPROLOL TARTRATE 5 MG: 5 INJECTION INTRAVENOUS at 12:36

## 2020-03-13 NOTE — THERAPY RE-EVALUATION
Acute Care - Speech Language Pathology   Swallow Re-Evaluation [facility]     Patient Name: Maria Luz Wade  : 1945  MRN: [id number]  Today's Date: 3/13/2020               Admit Date: 3/11/2020    Visit Dx:     ICD-10-CM ICD-9-CM   1. Dysphagia, unspecified type R13.10 787.20   2. Cognitive communication deficit R41.841 799.52     Patient Active Problem List   Diagnosis   • Dizziness   • PVC (premature ventricular contraction)   • Smoker   • Palpitations   • Dyslipidemia on statin    • Essential hypertension   • R MCA AIS s/p tPA and thrombectomy    • Tissue plasminogen activator (tPA) administered at other facility within 24 hours prior to current admission   • Intracranial bleed (CMS/HCC); hemorrhagic conversion of an ischemic stroke     Past Medical History:   Diagnosis Date   • History of tonsillectomy    • History of tubal ligation    • Hyperlipidemia      Past Surgical History:   Procedure Laterality Date   • CARDIOVASCULAR STRESS TEST  2017    L.Myoview- R/O Anterior Ischemia.   • CONVERTED (HISTORICAL) HOLTER  2020    AVG 77. . 6.8% PVC. one 6 beats run of V-Tach   • ECHO - CONVERTED  2017    EF 65%. Mild-Mod MR   • INTERVENTIONAL RADIOLOGY PROCEDURE Bilateral 3/11/2020    Procedure: CAROTID CEREBRAL ANGIOGRAM BILATERAL;  Surgeon: Adryan Carter MD;  Location: [facility] INVASIVE LOCATION;  Service: Interventional Radiology;  Laterality: Bilateral;   • OTHER SURGICAL HISTORY      CT Scan- 60% (L) ICA. 50% (R) ICA   • US CAROTID UNILATERAL  2017    @ [facility]. No sig stenosis        SWALLOW EVALUATION (last 72 hours)      SLP Adult Swallow Evaluation     Row Name 20 1015 20 1345 20 0930 20 1400          Rehab Evaluation    Document Type  re-evaluation  -VO  evaluation  -SA  re-evaluation  -SM  evaluation  -AV     Subjective Information  no complaints  -VO  no complaints  -SA  --  no complaints  -AV     Patient Observations   alert;cooperative  -  alert;cooperative  -  lethargic  -  alert;cooperative  -AV     Patient/Family Observations  dtr present  -VO  Family stated alertness fluctuation throughout day, however, presented as much more alert during eval when compared to previous SLP attempt.   -  Family present  -SM  no family present   -AV     Patient Effort  good  -VO  good  -SA  --  good  -AV     Comment  --  --  Eval performed with [facility]  --        General Information    Patient Profile Reviewed  yes  -VO  yes  -SA  --  yes  -AV     Pertinent History Of Current Problem  see previous  -VO  CVA post TPA and thrombectomy; RN notified SLP about improvement in medical status   -  RN consulted after family noted coughing with intake.   -  CVA post TPA and thrombectomy  -     Current Method of Nutrition  --  NPO  -  regular textures;thin liquids though holding trays  -  NPO  -AV     Precautions/Limitations, Vision  --  WFL;for purposes of eval  -  --  WFL;for purposes of eval  -AV     Precautions/Limitations, Hearing  --  WFL;for purposes of eval  -  --  WFL;for purposes of eval  -AV     Prior Level of Function-Communication  --  --  --  WFL  -AV     Prior Level of Function-Swallowing  --  no diet consistency restrictions  -  --  no diet consistency restrictions  -     Plans/Goals Discussed with  --  patient and family;agreed upon  -  family  -  patient  -AV     Barriers to Rehab  --  --  medically complex  -  medically complex  -AV     Patient's Goals for Discharge  --  patient did not state  -  patient could not state  -  return to PO diet  -     Family Goals for Discharge  --  family did not state  -  patient able to eat/drink without coughing/choking  -  --        Pain Assessment    Additional Documentation  --  Pain Scale: Numbers Pre/Post-Treatment (Group)  -  --  Pain Scale: FACES Pre/Post-Treatment (Group)  -AV        Pain Scale: Numbers Pre/Post-Treatment    Pain Scale: Numbers,  Pretreatment  0/10 - no pain  -VO  --  --  --     Pain Scale: Numbers, Post-Treatment  0/10 - no pain  -VO  8/10  -SA  --  --     Pain Location  --  head  -SA  --  --     Pre/Post Treatment Pain Comment  --  Pt stated increased presssure on right side of head- notified RN   -SA  --  --        Pain Scale: FACES Pre/Post-Treatment    Pain: FACES Scale, Pretreatment  --  --  0-->no hurt  -SM  0-->no hurt  -AV     Pain: FACES Scale, Post-Treatment  --  --  0-->no hurt  -SM  0-->no hurt  -AV        Oral Motor and Function    Dentition Assessment  upper dentures/partial in place;lower dentures/partial in place  -VO  upper dentures/partial in place;lower dentures/partial in place  -SA  --  edentulous, dentures not available  -AV     Secretion Management  WNL/WFL  -VO  --  --  dried secretions in oral cavity  -AV     Mucosal Quality  moist, healthy  -VO  --  --  dry  -AV     Volitional Swallow  WFL  -VO  --  --  WFL  -AV     Volitional Cough  WFL  -VO  --  --  WFL  -AV        Oral Musculature and Cranial Nerve Assessment    Oral Motor General Assessment  oral labial or buccal impairment  -VO  WFL  -SA  unable to assess  -  oral labial or buccal impairment  -AV     Oral Labial or Buccal Impairment, Detail, Cranial Nerve VII (Facial):  left labial droop  -VO  --  --  --        General Eating/Swallowing Observations    Respiratory Support Currently in Use  --  --  --  nasal cannula  -AV     Eating/Swallowing Skills  --  self-fed  -  --  fed by SLP  -AV     Positioning During Eating  --  upright in bed  -SA  --  upright in bed  -AV     Utensils Used  --  spoon;cup;straw  -  --  spoon;cup;straw  -AV     Consistencies Trialed  --  regular textures;pureed;nectar/syrup-thick liquids  -  --  regular textures;pureed;thin liquids  -AV        Clinical Swallow Eval    Oral Prep Phase  impaired  -VO  impaired  -SA  --  WFL  -AV     Oral Transit  --  --  -SA  --  WFL  -AV     Oral Residue  impaired  -VO  --  --  WFL  -AV      Pharyngeal Phase  suspected pharyngeal impairment  -VO  suspected pharyngeal impairment  -SA  --  no overt signs/symptoms of pharyngeal impairment  -AV     Esophageal Phase  unremarkable  -VO  --  --  unremarkable  -AV     Clinical Swallow Evaluation Summary  Cough w/ thins via straw. Concerns for coughing overnight and taking multiple swallows to get foods/drinks down. Will f/u w/ FEES to r/o dysphagia.  -VO  Pt seen for clinical swallow re-evaluation d/t to change in status from last CSE on 3/12. Attempted to see pt for re-eval earlier during the day, but pt not alert enough for PO. RN notified SLP about improvement in alertness. Clinical re-eval completed. Pt given trials of ice chips x1, tsp thin x1, nectar thick liquid via tsp/cup/straw, puree, and regular solid. Cough w/ ice chip and thin liquid. No s/sxs w/ all trials of nectar and puree. Pocketing w/ solid regular that was cleared after liquid wash x2. Rec soft solids w/ nectar thick liquids. Re-assess at bedside tomorrow before instrumental to further assess swallow function.   -SA  could not alert pt enough for safe PO intake. Alerted for a few seconds at a time with max cueing. Following assessment, RN informed pt has had a chnage in status with new large bleed. MD has made pt NPO.  -[facility] completed this pm: no overt s/s with trials at bedside. Safe for reg and thins at this time. Will follow up tomorrow for [facility] eval.   -AV        Oral Prep Concerns    Oral Prep Concerns  prolonged mastication;anterior loss  -VO  prolonged mastication Pocketing that cleared w/ liquid wash x2   -SA  --  --     Prolonged Mastication  regular consistencies  -VO  regular consistencies  -SA  --  --     Anterior Loss  thin;nectar  -VO  --  --  --        Oral Residue Concerns    Oral Residue Concerns  lateral sulcus residue, left  -VO  --  --  --     Lateral Sulcus Residue, Left  regular consistencies  -VO  --  --  --     Diffuse Residue Throughout Oral Cavity  regular  consistencies  -VO  --  --  --        Pharyngeal Phase Concerns    Pharyngeal Phase Concerns  cough  -VO  cough  -SA  --  --     Cough  thin  -VO  thin  -SA  --  --        MBS/VFSS    Utensils Used  --  spoon;cup;straw  -SA  --  --        Clinical Impression    SLP Swallowing Diagnosis  mild-moderate;oral dysfunction;pharyngeal dysfunction  -VO  suspected pharyngeal dysfunction  -SA  --  functional oral phase;functional pharyngeal phase  -AV     Functional Impact  risk of aspiration/pneumonia  -VO  risk of aspiration/pneumonia  -SA  --  no impact on function  -AV     Rehab Potential/Prognosis, Swallowing  good, to achieve stated therapy goals  -VO  good, to achieve stated therapy goals  -SA  --  good, to achieve stated therapy goals  -AV     Swallow Criteria for Skilled Therapeutic Interventions Met  demonstrates skilled criteria  -VO  demonstrates skilled criteria  -SA  --  baseline status  -AV        Recommendations    Therapy Frequency (Swallow)  evaluation only  -VO  evaluation only  -SA  --  evaluation only  -AV     Predicted Duration Therapy Intervention (Days)  until discharge  -VO  until discharge  -SA  --  --     SLP Diet Recommendation  mechanical soft with no mixed consistencies;nectar thick liquids  -VO  mechanical soft with no mixed consistencies;nectar thick liquids  -SA  NPO  -SM  regular textures;thin liquids  -AV     Recommended Diagnostics  FEES  -VO  FEES  -SA  other (see comments) will check back tomorrow for status  -SM  --     Recommended Precautions and Strategies  upright posture during/after eating;small bites of food and sips of liquid  -VO  upright posture during/after eating;small bites of food and sips of liquid  -SA  --  --     SLP Rec. for Method of Medication Administration  meds whole;with pudding or applesauce;as tolerated  -VO  meds whole;with pudding or applesauce;as tolerated  -SA  -- as per MD  -SM  meds whole;as tolerated  -AV     Monitor for Signs of Aspiration  yes;notify SLP  if any concerns  -VO  yes;notify SLP if any concerns  -SA  --  yes;notify SLP if any concerns  -AV     Anticipated Dischage Disposition  unknown;anticipate therapy at next level of care  -VO  unknown;anticipate therapy at next level of care  -SA  --  unknown;anticipate therapy at next level of care  -AV       User Key  (r) = Recorded By, (t) = Taken By, (c) = Cosigned By    Initials Name Effective Dates    Yola Álvarez, MS CCC-SLP 06/22/15 -     AV Esmer Valdes, MS CCC-SLP 05/23/19 -     VO Callie Childs MA,CCC-SLP 10/24/18 -     SA Inés Hair, SLP 02/25/20 -           EDUCATION  The patient has been educated in the following areas:   Dysphagia (Swallowing Impairment).    SLP Recommendation and Plan  SLP Swallowing Diagnosis: mild-moderate, oral dysfunction, pharyngeal dysfunction  SLP Diet Recommendation: mechanical soft with no mixed consistencies, nectar thick liquids  Recommended Precautions and Strategies: upright posture during/after eating, small bites of food and sips of liquid  SLP Rec. for Method of Medication Administration: meds whole, with pudding or applesauce, as tolerated     Monitor for Signs of Aspiration: yes, notify SLP if any concerns  Recommended Diagnostics: FEES  Swallow Criteria for Skilled Therapeutic Interventions Met: demonstrates skilled criteria  Anticipated Dischage Disposition: unknown, anticipate therapy at next level of care  Rehab Potential/Prognosis, Swallowing: good, to achieve stated therapy goals  Therapy Frequency (Swallow): evaluation only  Predicted Duration Therapy Intervention (Days): until discharge       Plan of Care Reviewed With: patient, daughter           Time Calculation:   Time Calculation- SLP     Row Name 03/13/20 1311             Time Calculation- SLP    SLP Start Time  1015  -VO      SLP Received On  03/13/20  -VO        User Key  (r) = Recorded By, (t) = Taken By, (c) = Cosigned By    Initials Name Provider Type    VO  Callie Childs MA,CCC-SLP Speech and Language Pathologist          Therapy Charges for Today     Code Description Service Date Service Provider Modifiers Qty    [id number]  ST EVAL ORAL PHARYNG SWALLOW 3 3/13/2020 Callie Childs MA,CCC-SLP GN 1               Callie Childs MA,CCC-SLP  3/13/2020

## 2020-03-13 NOTE — MBS/VFSS/FEES
Acute Care - Speech Language Pathology   Swallow Re-Evaluation [facility]   Fiberoptic Endoscopic Evaluation of Swallowing (FEES)       Patient Name: Maria Luz Wade  : 1945  MRN: [id number]  Today's Date: 3/13/2020               Admit Date: 3/11/2020    Visit Dx:     ICD-10-CM ICD-9-CM   1. Dysphagia, unspecified type R13.10 787.20   2. Cognitive communication deficit R41.841 799.52     Patient Active Problem List   Diagnosis   • Dizziness   • PVC (premature ventricular contraction)   • Smoker   • Palpitations   • Dyslipidemia on statin    • Essential hypertension   • R MCA AIS s/p tPA and thrombectomy    • Tissue plasminogen activator (tPA) administered at other facility within 24 hours prior to current admission   • Intracranial bleed (CMS/HCC); hemorrhagic conversion of an ischemic stroke     Past Medical History:   Diagnosis Date   • History of tonsillectomy    • History of tubal ligation    • Hyperlipidemia      Past Surgical History:   Procedure Laterality Date   • CARDIOVASCULAR STRESS TEST  2017    L.Myoview- R/O Anterior Ischemia.   • CONVERTED (HISTORICAL) HOLTER  2020    AVG 77. . 6.8% PVC. one 6 beats run of V-Tach   • ECHO - CONVERTED  2017    EF 65%. Mild-Mod MR   • INTERVENTIONAL RADIOLOGY PROCEDURE Bilateral 3/11/2020    Procedure: CAROTID CEREBRAL ANGIOGRAM BILATERAL;  Surgeon: Adryan Carter MD;  Location: [facility] INVASIVE LOCATION;  Service: Interventional Radiology;  Laterality: Bilateral;   • OTHER SURGICAL HISTORY      CT Scan- 60% (L) ICA. 50% (R) ICA   • US CAROTID UNILATERAL  2017    @ [facility]. No sig stenosis        SWALLOW EVALUATION (last 72 hours)      SLP Adult Swallow Evaluation     Row Name 20 1330 20 1015 20 1345 20 0930 20 1400       Rehab Evaluation    Document Type  re-evaluation  -VO  re-evaluation  -VO  evaluation  -SA  re-evaluation  -SM  evaluation  -AV    Subjective Information  no  complaints  -VO  no complaints  -VO  no complaints  -  --  no complaints  -AV    Patient Observations  alert;cooperative  -VO  alert;cooperative  -VO  alert;cooperative  -  lethargic  -  alert;cooperative  -AV    Patient/Family Observations  --  dtr present  -VO  Family stated alertness fluctuation throughout day, however, presented as much more alert during eval when compared to previous SLP attempt.   -  Family present  -  no family present   -AV    Patient Effort  good  -VO  good  -VO  good  -SA  --  good  -AV    Comment  --  --  --  Eval performed with [facility]  --       General Information    Patient Profile Reviewed  --  yes  -VO  yes  -SA  --  yes  -AV    Pertinent History Of Current Problem  --  see previous  -  CVA post TPA and thrombectomy; RN notified SLP about improvement in medical status   -  RN consulted after family noted coughing with intake.   -  CVA post TPA and thrombectomy  -    Current Method of Nutrition  --  --  NPO  -  regular textures;thin liquids though holding trays  -  NPO  -AV    Precautions/Limitations, Vision  --  --  WFL;for purposes of eval  -  --  WFL;for purposes of eval  -AV    Precautions/Limitations, Hearing  --  --  WFL;for purposes of eval  -  --  WFL;for purposes of eval  -AV    Prior Level of Function-Communication  --  --  --  --  WFL  -    Prior Level of Function-Swallowing  --  --  no diet consistency restrictions  [facility]  --  no diet consistency restrictions  -AV    Plans/Goals Discussed with  --  --  patient and family;agreed upon  -  family  -  patient  -AV    Barriers to Rehab  --  --  --  medically complex  -  medically complex  -AV    Patient's Goals for Discharge  --  --  patient did not state  -  patient could not state  -  return to PO diet  -AV    Family Goals for Discharge  --  --  family did not state  -  patient able to eat/drink without coughing/choking  -  --       Pain Assessment    Additional Documentation  --  --   Pain Scale: Numbers Pre/Post-Treatment (Group)  -SA  --  Pain Scale: FACES Pre/Post-Treatment (Group)  -AV       Pain Scale: Numbers Pre/Post-Treatment    Pain Scale: Numbers, Pretreatment  0/10 - no pain  -VO  0/10 - no pain  -VO  --  --  --    Pain Scale: Numbers, Post-Treatment  0/10 - no pain  -VO  0/10 - no pain  -VO  8/10  -SA  --  --    Pain Location  --  --  head  -SA  --  --    Pre/Post Treatment Pain Comment  --  --  Pt stated increased presssure on right side of head- notified RN   -SA  --  --       Pain Scale: FACES Pre/Post-Treatment    Pain: FACES Scale, Pretreatment  --  --  --  0-->no hurt  -SM  0-->no hurt  -AV    Pain: FACES Scale, Post-Treatment  --  --  --  0-->no hurt  -SM  0-->no hurt  -AV       Oral Motor and Function    Dentition Assessment  --  upper dentures/partial in place;lower dentures/partial in place  -VO  upper dentures/partial in place;lower dentures/partial in place  -SA  --  edentulous, dentures not available  -AV    Secretion Management  --  WNL/WFL  -VO  --  --  dried secretions in oral cavity  -AV    Mucosal Quality  --  moist, healthy  -VO  --  --  dry  -AV    Volitional Swallow  --  WFL  -VO  --  --  WFL  -AV    Volitional Cough  --  WFL  -VO  --  --  WFL  -AV       Oral Musculature and Cranial Nerve Assessment    Oral Motor General Assessment  --  oral labial or buccal impairment  -VO  [facility]  -SA  unable to assess  -  oral labial or buccal impairment  -AV    Oral Labial or Buccal Impairment, Detail, Cranial Nerve VII (Facial):  --  left labial droop  -VO  --  --  --       General Eating/Swallowing Observations    Respiratory Support Currently in Use  --  --  --  --  nasal cannula  -AV    Eating/Swallowing Skills  --  --  self-fed  -SA  --  fed by SLP  -AV    Positioning During Eating  --  --  upright in bed  -SA  --  upright in bed  -AV    Utensils Used  --  --  spoon;cup;straw  -SA  --  spoon;cup;straw  -AV    Consistencies Trialed  --  --  regular  textures;pureed;nectar/syrup-thick liquids  -SA  --  regular textures;pureed;thin liquids  -AV       Clinical Swallow Eval    Oral Prep Phase  --  impaired  -VO  impaired  -SA  --  WFL  -AV    Oral Transit  --  --  --  -SA  --  WFL  -AV    Oral Residue  --  impaired  -VO  --  --  WFL  -AV    Pharyngeal Phase  --  suspected pharyngeal impairment  -VO  suspected pharyngeal impairment  -SA  --  no overt signs/symptoms of pharyngeal impairment  -AV    Esophageal Phase  --  unremarkable  -VO  --  --  unremarkable  -AV    Clinical Swallow Evaluation Summary  --  Cough w/ thins via straw. Concerns for coughing overnight and taking multiple swallows to get foods/drinks down. Will f/u w/ FEES to r/o dysphagia.  -VO  Pt seen for clinical swallow re-evaluation d/t to change in status from last CSE on 3/12. Attempted to see pt for re-eval earlier during the day, but pt not alert enough for PO. RN notified SLP about improvement in alertness. Clinical re-eval completed. Pt given trials of ice chips x1, tsp thin x1, nectar thick liquid via tsp/cup/straw, puree, and regular solid. Cough w/ ice chip and thin liquid. No s/sxs w/ all trials of nectar and puree. Pocketing w/ solid regular that was cleared after liquid wash x2. Rec soft solids w/ nectar thick liquids. Re-assess at bedside tomorrow before instrumental to further assess swallow function.   -SA  could not alert pt enough for safe PO intake. Alerted for a few seconds at a time with max cueing. Following assessment, RN informed pt has had a chnage in status with new large bleed. MD has made pt NPO.  -  CSE completed this pm: no overt s/s with trials at bedside. Safe for reg and thins at this time. Will follow up tomorrow for SLC eval.   -AV       Oral Prep Concerns    Oral Prep Concerns  --  prolonged mastication;anterior loss  -VO  prolonged mastication Pocketing that cleared w/ liquid wash x2   -SA  --  --    Prolonged Mastication  --  regular consistencies  -VO  regular  consistencies  -SA  --  --    Anterior Loss  --  thin;nectar  -VO  --  --  --       Oral Residue Concerns    Oral Residue Concerns  --  lateral sulcus residue, left  -VO  --  --  --    Lateral Sulcus Residue, Left  --  regular consistencies  -VO  --  --  --    Diffuse Residue Throughout Oral Cavity  --  regular consistencies  -VO  --  --  --       Pharyngeal Phase Concerns    Pharyngeal Phase Concerns  --  cough  -VO  cough  -SA  --  --    Cough  --  thin  -VO  thin  -SA  --  --       MBS/VFSS    Utensils Used  --  --  spoon;cup;straw  -SA  --  --       Fiberoptic Endoscopic Evaluation of Swallowing (FEES)    Risks/Benefits Reviewed  risks/benefits explained;patient;family;agreed to eval  -VO  --  --  --  --    Nasal Entry  right:  -VO  --  --  --  --    Special Considerations  837  -VO  --  --  --  --       Anatomy and Physiology    Anatomic Considerations  no anatomic structural deviation  -VO  --  --  --  --    Velopharyngeal  WFL  -VO  --  --  --  --    Base of Tongue  symmetrical  -VO  --  --  --  --    Epiglottis  WFL  -VO  --  --  --  --    Laryngeal Function Breathing  symmetrical  -VO  --  --  --  --    Laryngeal Function Phonation  symmetrical  -VO  --  --  --  --    Laryngeal Function to Breath Hold  TVF/FVF/Arytenoid  -VO  --  --  --  --    Secretion Rating Scale (Mitch nava alMelvin 1996)  0- normal, no visible secretions  -VO  --  --  --  --    Spontaneous Swallow  frequency adequate  -VO  --  --  --  --    Sensory  sensed scope  -VO  --  --  --  --    Consistencies Trialed  thin liquids;pudding/puree;Regular textures  -VO  --  --  --  --       FEES Interpretation    Oral Phase  WFL  -VO  --  --  --  --       Initiation of Pharyngeal Swallow    Initiation of Pharyngeal Swallow  WFL  -VO  --  --  --  --    Pharyngeal Phase  functional pharyngeal phase of swallow  -VO  --  --  --  --    FEES Summary  No penetration/aspiration w/ any consistency. No significant residue. Mild-moderat oral dysphagia c/b  labial/lingual weakness resulting in prolonged mastication and oral residue and pocketing of foods on L side of oral cavity. Requires cues to lingual sweep/wash. Rec soft/ground, thins.   -VO  --  --  --  --       Clinical Impression    SLP Swallowing Diagnosis  mild-moderate;oral dysfunction;functional pharyngeal phase  -VO  mild-moderate;oral dysfunction;pharyngeal dysfunction  -VO  suspected pharyngeal dysfunction  -SA  --  functional oral phase;functional pharyngeal phase  -AV    Functional Impact  risk of malnutrition;risk of aspiration/pneumonia  -VO  risk of aspiration/pneumonia  -VO  risk of aspiration/pneumonia  -SA  --  no impact on function  -AV    Rehab Potential/Prognosis, Swallowing  good, to achieve stated therapy goals  -VO  good, to achieve stated therapy goals  -VO  good, to achieve stated therapy goals  -SA  --  good, to achieve stated therapy goals  -AV    Swallow Criteria for Skilled Therapeutic Interventions Met  demonstrates skilled criteria  -VO  demonstrates skilled criteria  -VO  demonstrates skilled criteria  -SA  --  baseline status  -AV       Recommendations    Therapy Frequency (Swallow)  3 days per week  -VO  evaluation only  -VO  evaluation only  -SA  --  evaluation only  -AV    Predicted Duration Therapy Intervention (Days)  until discharge  -VO  until discharge  -VO  until discharge  -SA  --  --    SLP Diet Recommendation  soft textures;ground;thin liquids  -VO  mechanical soft with no mixed consistencies;nectar thick liquids  -VO  mechanical soft with no mixed consistencies;nectar thick liquids  -SA  NPO  -SM  regular textures;thin liquids  -AV    Recommended Diagnostics  --  FEES  -VO  FEES  -SA  other (see comments) will check back tomorrow for status  -SM  --    Recommended Precautions and Strategies  upright posture during/after eating;small bites of food and sips of liquid;check mouth frequently for oral residue/pocketing  -VO  upright posture during/after eating;small bites  of food and sips of liquid  -VO  upright posture during/after eating;small bites of food and sips of liquid  -SA  --  --    SLP Rec. for Method of Medication Administration  meds whole;with thin liquids;as tolerated  -VO  meds whole;with pudding or applesauce;as tolerated  -VO  meds whole;with pudding or applesauce;as tolerated  -SA  -- as per MD  -BRENDAN  meds whole;as tolerated  -AV    Monitor for Signs of Aspiration  yes;notify SLP if any concerns  -VO  yes;notify SLP if any concerns  -VO  yes;notify SLP if any concerns  -SA  --  yes;notify SLP if any concerns  -AV    Anticipated Dischage Disposition  inpatient rehabilitation facility;anticipate therapy at next level of care  -VO  unknown;anticipate therapy at next level of care  -VO  unknown;anticipate therapy at next level of care  -SA  --  unknown;anticipate therapy at next level of care  -AV      User Key  (r) = Recorded By, (t) = Taken By, (c) = Cosigned By    Initials Name Effective Dates    Yola Álvarez, MS CCC-SLP 06/22/15 -     AV Esmer Valdes, MS CCC-SLP 05/23/19 -     VO Callie Childs MA,CCC-SLP 10/24/18 -     SA Inés Hair, SLP 02/25/20 -           EDUCATION  The patient has been educated in the following areas:   Dysphagia (Swallowing Impairment) Oral Care/Hydration Modified Diet Instruction.    SLP Recommendation and Plan  SLP Swallowing Diagnosis: mild-moderate, oral dysfunction, functional pharyngeal phase  SLP Diet Recommendation: soft textures, ground, thin liquids  Recommended Precautions and Strategies: upright posture during/after eating, small bites of food and sips of liquid, check mouth frequently for oral residue/pocketing  SLP Rec. for Method of Medication Administration: meds whole, with thin liquids, as tolerated     Monitor for Signs of Aspiration: yes, notify SLP if any concerns  Recommended Diagnostics: FEES  Swallow Criteria for Skilled Therapeutic Interventions Met: demonstrates skilled  criteria  Anticipated Dischage Disposition: inpatient rehabilitation facility, anticipate therapy at next level of care  Rehab Potential/Prognosis, Swallowing: good, to achieve stated therapy goals  Therapy Frequency (Swallow): 3 days per week  Predicted Duration Therapy Intervention (Days): until discharge       Plan of Care Reviewed With: patient, family    SLP GOALS     Row Name 03/13/20 4324             Oral Nutrition/Hydration Goal 1 (SLP)    Oral Nutrition/Hydration Goal 1, SLP  LTG: Pt will return to regular diet, thin liquids w/o aspiration or difficulty.  -VO      Time Frame (Oral Nutrition/Hydration Goal 1, SLP)  short term goal (STG)  -VO      Progress/Outcomes (Oral Nutrition/Hydration Goal 1, SLP)  goal ongoing  -VO         Oral Nutrition/Hydration Goal 2 (SLP)    Oral Nutrition/Hydration Goal 2, SLP  Pt will demonstrate adequate mastication of solid w/o oral pocketing/residue w/ 100% acc w/o cues.  -VO      Time Frame (Oral Nutrition/Hydration Goal 2, SLP)  short term goal (STG)  -VO      Progress/Outcomes (Oral Nutrition/Hydration Goal 2, SLP)  goal ongoing  -VO         Lingual Strengthening Goal 1 (SLP)    Activity (Lingual Strengthening Goal 1, SLP)  increase lingual tone/sensation/control/coordination/movement  -VO      Increase Lingual Tone/Sensation/Control/Coordination/Movement  swallow trials;lingual resistance exercises  -VO      Ethan/Accuracy (Lingual Strengthening Goal 1, SLP)  with minimal cues (75-90% accuracy)  -VO      Time Frame (Lingual Strengthening Goal 1, SLP)  short term goal (STG)  -VO      Progress/Outcomes (Lingual Strengthening Goal 1, SLP)  goal ongoing  -VO         Swallow Compensatory Strategies Goal 1 (SLP)    Activity (Swallow Compensatory Strategies/Techniques Goal 1, SLP)  compensatory strategies;food/liquid placed on stronger right side;alternate food/liquid intake lingual sweep  -VO      Ethan/Accuracy (Swallow Compensatory Strategies/Techniques Goal 1,  SLP)  with minimal cues (75-90% accuracy)  -VO      Time Frame (Swallow Compensatory Strategies/Techniques Goal 1, SLP)  short term goal (STG)  -VO      Progress/Outcomes (Swallow Compensatory Strategies/Techniques Goal 1, SLP)  goal ongoing  -VO        User Key  (r) = Recorded By, (t) = Taken By, (c) = Cosigned By    Initials Name Provider Type    Callie Paredes MA,CCC-SLP Speech and Language Pathologist             Time Calculation:   Time Calculation- SLP     Row Name 03/13/20 1527 03/13/20 1311          Time Calculation- SLP    SLP Start Time  1330  -VO  1015  -VO     SLP Received On  03/13/20  -VO  03/13/20  -VO       User Key  (r) = Recorded By, (t) = Taken By, (c) = Cosigned By    Initials Name Provider Type    Callie Paredes MA,CCC-SLP Speech and Language Pathologist          Therapy Charges for Today     Code Description Service Date Service Provider Modifiers Qty    43919308337 HC ST EVAL ORAL PHARYNG SWALLOW 3 3/13/2020 Callie Childs MA,CCC-SLP GN 1    [id number] HC ST FIBEROPTIC ENDO EVAL SWALL 4 3/13/2020 Callie Childs MA,CCC-SLP GN 1               Callie Childs MA,UZMA-SLP  3/13/2020

## 2020-03-13 NOTE — PROGRESS NOTES
"Adult Nutrition  Assessment/PES    Patient Name:  Maria Luz Wade  YOB: 1945  MRN: [id number]  Admit Date:  3/11/2020    Assessment Date:  3/13/2020    Comments: FEES evaluation: advanced to thin liquids; menu adjusted to pt's preference. RD will monitor adequacy of intake.    Reason for Assessment     Row Name 03/13/20 1535          Reason for Assessment    Reason For Assessment  per organizational policy;identified at risk by screening criteria MDR, adm assessment , dysphagia ; 30 mins     Diagnosis  neurologic conditions pt adm w R MCA, CVA s/p tPA and thrombectomy w/ hemorrhagic conversion , PVCs Hx: HTN DLP , tob use     Identified At Risk by Screening Criteria  difficulty chewing/swallowing         Nutrition/Diet History     Row Name 03/13/20 1537          Nutrition/Diet History    Typical Food/Fluid Intake  RN reports CT shows volume of hemorrhage has increased to 36 ml but has improved NIH SS of 5; noted pt to have wet cough and vocal quailty after eating and drinking . SLP consulted for re-evlauation/     Factors Affecting Nutritional Intake  chewing difficulties/inability to chew food         Anthropometrics     Row Name 03/13/20 1539          Anthropometrics    Height  160 cm (63\")     Weight  53.3 kg (117 lb 8.1 oz)        Admit Weight    Admit Weight  52.6 kg (116 lb)        Ideal Body Weight (IBW)    Ideal Body Weight (IBW) (kg)  52.72     % Ideal Body Weight  101.1        Body Mass Index (BMI)    BMI (kg/m2)  20.86     BMI Assessment  BMI 18.5-24.9: normal         Labs/Tests/Procedures/Meds     Row Name 03/13/20 1539          Labs/Procedures/Meds    Lab Results Reviewed  reviewed, pertinent        Diagnostic Tests/Procedures    Diagnostic Test/Procedure Reviewed  reviewed, pertinent     Diagnostic Test/Procedures Comments  SLP  FEES eval noted, CT reviewed in MDR        Medications    Pertinent Medications Reviewed  reviewed, pertinent         Physical Findings     Row Name 03/13/20 " 1540          Physical Findings    Overall Physical Appearance  other (see comments) asleep, asked not to wake pt           Nutrition Prescription Ordered     Row Name 03/13/20 1541          Nutrition Prescription PO    Current PO Diet  Soft Texture     Texture  Ground     Fluid Consistency  Thin     Common Modifiers  Cardiac         Evaluation of Received Nutrient/Fluid Intake     Row Name 03/13/20 1541          PO Evaluation    Number of Days PO Intake Evaluated  Insufficient Data     Number of Meals  1     % PO Intake  75               Problem/Interventions:  Problem 1     Row Name 03/13/20 1542          Nutrition Diagnoses Problem 1    Problem 1  Biting/Chewing Difficulty     Etiology (related to)  Medical Diagnosis     Neurological  CVA     Signs/Symptoms (evidenced by)  SLP/Swallow eval     Swallow eval status  Done     Type of SLP Evaluation  VFSS FEES               Intervention Goal     Row Name 03/13/20 1542          Intervention Goal    General  Meet nutritional needs for age/condition     PO  Tolerate PO;Modify texture/consistency         Nutrition Intervention     Row Name 03/13/20 1542          Nutrition Intervention    RD/Tech Action  Follow Tx progress;Care plan reviewd;Interview for preference obtained from RN/family           Education/Evaluation     Row Name 03/13/20 1543          Monitor/Evaluation    Monitor  Per protocol;PO intake;Pertinent labs;Weight;Symptoms           Electronically signed by:  Lali Cabrera MS,RD,LD  03/13/20 15:43

## 2020-03-13 NOTE — PLAN OF CARE
Problem: Patient Care Overview  Goal: Plan of Care Review  3/13/2020 1526 by Callie Childs MA,CCC-SLP  Outcome: Ongoing (interventions implemented as appropriate)  Flowsheets (Taken 3/13/2020 1526)  Plan of Care Reviewed With: patient; family  Note:   SLP re-evaluation completed. Will continue to address oral dysphagia and communication. Please see note for further details and recommendations.

## 2020-03-13 NOTE — PLAN OF CARE
Problem: Patient Care Overview  Goal: Plan of Care Review  Outcome: Ongoing (interventions implemented as appropriate)  Flowsheets (Taken 3/13/2020 1310)  Plan of Care Reviewed With: patient; daughter  Note:   SLP re-evaluation completed. Will continue to address dysphagia concerns w/ FEES. Please see note for further details and recommendations.

## 2020-03-13 NOTE — PLAN OF CARE
Problem: Patient Care Overview  Goal: Plan of Care Review  Outcome: Ongoing (interventions implemented as appropriate)  Flowsheets (Taken 3/13/2020 1031)  Progress: improving  Plan of Care Reviewed With: patient  Outcome Summary: OT completed a brief chart review. Pt limited d/t L sided field cut and weakness. Pt educated on AE use to improve independence w/ self-feeding d/t B hand numbness. Recommend cont skilled IPOT POC. Recommend pt DC to IP rehab.

## 2020-03-13 NOTE — DISCHARGE PLACEMENT REQUEST
"Maria Luz Patel (74 y.o. Female)     Lenora simms  [phone]    Date of Birth Social Security Number Address Home Phone MRN    1945  [address] [place] [address] [address] [place] [address] [phone] [id number]    [employer] Status          Unknown        Admission Date Admission Type Admitting Provider Attending Provider Department, Room/Bed    3/11/20 Urgent Case, DO Chica Oliveira Donna C, MD [facility] 2B ICU, N223/1    Discharge Date Discharge Disposition Discharge Destination                       Attending Provider:  Lenora Coto MD    Allergies:  No Known Allergies    Isolation:  None   Infection:  None   Code Status:  CPR    Ht:  160 cm (63\")   Wt:  53.3 kg (117 lb 8.1 oz)    Admission Cmt:  None   Principal Problem:  R MCA AIS s/p tPA and thrombectomy  [I63.9] More...                 Active Insurance as of 3/11/2020     Primary Coverage     Payor Plan Insurance Group Employer/Plan Group    MEDICARE MEDICARE A & B      Payor Plan Address Payor Plan Phone Number Payor Plan Fax Number Effective Dates    [address] [phone]  11/1/2010 - None Entered    [place] [id number]       Subscriber Name Subscriber Birth Date Member ID       MARIA LUZ PATEL 1945 [id number]                 Emergency Contacts      (Rel.) Home Phone Work Phone Mobile Phone    Cole Patel (Spouse) [phone] -- --    ANISHA RICO [phone] -- --            Insurance Information                MEDICARE/MEDICARE A & B Phone: [phone]    Subscriber: RentonMaria Luz naidu Subscriber#: [id number]    Group#:  Precert#:              History & Physical      Lenora Coto MD at 03/11/20 0944              ICU ADMISSION NOTE    Chief complaint L-sided weakness   Right-sided CVA  Hypertension  Dyslipidemia  Tobacco abuse  Palpitations    Subjective     Maria Luz Patel is a 74 y.o. female smoker admitted to [facility] on 3/11 from [facility] for " management of stroke.     The patient presented to [facility] ED with complaints of slurred speech, left-sided hemiparesis, and left facial droop with NIH of 5 and GCS 15 on arrival. Symptoms began ~0610 while taking a shower. CT head negative for hemorrhage and she was deemed appropriate for thrombolytic therapy that was initiated at 0852. She is being transferred to [facility] for higher level of care.     On [facility] ICU arrival NIH 9 and perfusion imaging shows an anterior R MCA infarct with moderate amount of ischemic penumbra. She was taken to the cath lab per Dr. Monroy for successful Trevo mechanical thrombectomy of the right MCA thrombus suspected cardioembolic in origin. Post-cath she was transferred to the neurosurgical ICU in stable condition on Nicardipine infusion.     She was recently evaluated by her cardiologist and underwent Holter monitoring found to have 7% PVCs and a solitary 6 beat run of VT placed on sotalol. There is no mention of atrial arrhythmias. There is also a history of carotid stenosis diagnosed in 2015 evaluated by CTS who recommended serial imaging. Most recent duplex from 2017 revealed mild bilateral plaque formation without hemodynamically significant disease. At that time echocardiogram revealed EF >65% with evidence of mild diastolic dysfunction as well as mild-moderate MR and pathologic TR. she was scheduled to have a heart catheterization but it has not been completed.  She has never suffered a heart attack.  She is a current smoker.  She denies a chronic cough, wheezing.  She does have a rescue inhaler.  She does not wear home oxygen.  She also takes medication for dyslipidemia and hypertension.  Palpitations improved significantly when she was started on sotalol.      Review of Systems  Review of Systems   Respiratory: Negative for cough and wheezing.    Cardiovascular: Negative for chest pain and palpitations.   Musculoskeletal: Positive for gait problem.   Neurological: Positive for facial  asymmetry (Left facial droop ), speech difficulty and weakness (Left sided ).   All other systems reviewed and are negative.       Home Medications  Medications Prior to Admission   Medication Sig Dispense Refill Last Dose   • albuterol sulfate  (90 Base) MCG/ACT inhaler Inhale 2 puffs Every 4 (Four) Hours As Needed for Wheezing.   Taking   • amLODIPine (NORVASC) 5 MG tablet Take 5 mg by mouth 2 (Two) Times a Day.   Taking   • aspirin 81 MG EC tablet Take 81 mg by mouth 2 (Two) Times a Day.   Taking   • atorvastatin (LIPITOR) 40 MG tablet One and 1/2 tablets at night   Taking   • Budesonide (ENTOCORT EC) 3 MG 24 hr capsule Take 3 mg by mouth Every Morning.   Taking   • busPIRone (BUSPAR) 10 MG tablet Take 10 mg by mouth 3 (Three) Times a Day.   Taking   • celecoxib (CeleBREX) 100 MG capsule Take 100 mg by mouth 2 (Two) Times a Day.   Taking   • Cholecalciferol (VITAMIN D) 125 MCG (5000 UT) capsule capsule Take 5,000 Units by mouth Daily.   Taking   • DULoxetine (CYMBALTA) 60 MG capsule Take 60 mg by mouth 2 (Two) Times a Day.   Taking   • glucosamine-chondroitin 500-400 MG capsule capsule Take  by mouth 2 (Two) Times a Day With Meals.   Taking   • loratadine (CLARITIN) 10 MG tablet Take 10 mg by mouth Daily.   Taking   • pantoprazole (PROTONIX) 40 MG EC tablet Take 40 mg by mouth Daily.   Taking   • sertraline (ZOLOFT) 25 MG tablet Take 25 mg by mouth Daily.   Taking   • Sotalol HCl AF 80 MG tablet Take 1 tablet by mouth 2 (Two) Times a Day. 180 tablet 3 Taking   • vitamin B-12 (CYANOCOBALAMIN) 1000 MCG tablet Take 2,000 mcg by mouth Daily.   Taking       History  Past Medical History:   Diagnosis Date   • History of tonsillectomy 1951   • History of tubal ligation    • Hyperlipidemia      Past Surgical History:   Procedure Laterality Date   • CARDIOVASCULAR STRESS TEST  11/30/2017    L.Myoview- R/O Anterior Ischemia.   • CONVERTED (HISTORICAL) HOLTER  02/12/2020    AVG 77. . 6.8% PVC. one 6 beats run  of V-Tach   • ECHO - CONVERTED  11/30/2017    EF 65%. Mild-Mod MR   • OTHER SURGICAL HISTORY  2015    CT Scan- 60% (L) ICA. 50% (R) ICA   • US CAROTID UNILATERAL  11/16/2017    @ [facility]. No sig stenosis     Family History   Problem Relation Age of Onset   • Hyperlipidemia Mother    • Hypertension Mother    • Stroke Mother    • Heart attack Father    • Aneurysm Father    • Alzheimer's disease Maternal Aunt    • Stroke Maternal Grandmother    • Stroke Maternal Grandfather      Social History     Tobacco Use   • Smoking status: Current Every Day Smoker     Packs/day: 1.00     Years: 60.00     Pack years: 60.00   • Smokeless tobacco: Never Used   • Tobacco comment: patient counseled on effects of tobacco use on health   Substance Use Topics   • Alcohol use: No   • Drug use: No     Medications Prior to Admission   Medication Sig Dispense Refill Last Dose   • albuterol sulfate  (90 Base) MCG/ACT inhaler Inhale 2 puffs Every 4 (Four) Hours As Needed for Wheezing.   Taking   • amLODIPine (NORVASC) 5 MG tablet Take 5 mg by mouth 2 (Two) Times a Day.   Taking   • aspirin 81 MG EC tablet Take 81 mg by mouth 2 (Two) Times a Day.   Taking   • atorvastatin (LIPITOR) 40 MG tablet One and 1/2 tablets at night   Taking   • Budesonide (ENTOCORT EC) 3 MG 24 hr capsule Take 3 mg by mouth Every Morning.   Taking   • busPIRone (BUSPAR) 10 MG tablet Take 10 mg by mouth 3 (Three) Times a Day.   Taking   • celecoxib (CeleBREX) 100 MG capsule Take 100 mg by mouth 2 (Two) Times a Day.   Taking   • Cholecalciferol (VITAMIN D) 125 MCG (5000 UT) capsule capsule Take 5,000 Units by mouth Daily.   Taking   • DULoxetine (CYMBALTA) 60 MG capsule Take 60 mg by mouth 2 (Two) Times a Day.   Taking   • glucosamine-chondroitin 500-400 MG capsule capsule Take  by mouth 2 (Two) Times a Day With Meals.   Taking   • loratadine (CLARITIN) 10 MG tablet Take 10 mg by mouth Daily.   Taking   • pantoprazole (PROTONIX) 40 MG EC tablet Take 40 mg by mouth  Daily.   Taking   • sertraline (ZOLOFT) 25 MG tablet Take 25 mg by mouth Daily.   Taking   • Sotalol HCl AF 80 MG tablet Take 1 tablet by mouth 2 (Two) Times a Day. 180 tablet 3 Taking   • vitamin B-12 (CYANOCOBALAMIN) 1000 MCG tablet Take 2,000 mcg by mouth Daily.   Taking     Allergies:  Patient has no known allergies.      Objective     Vital Signs  Blood pressure 146/97, pulse 98, resp. rate 16, weight 52.8 kg (116 lb 6.5 oz), SpO2 92 %.    Physical Exam:  General Appearance:   Well-developed older woman in no acute distress   Head:   Normocephalic, atraumatic   Eyes:          Pupils equal and reactive to light.  Extraocular movements intact.   Ears:     Throat:  Oral mucosa moist.   Neck:  Trachea midline, no palpable thyroid   Back:      Lungs:    Symmetric chest expansion.  Prolonged expiration.  No wheeze or rhonchi    Heart:   Regular rhythm, S1, S2 auscultated, no murmur.  Occasional early beat   Abdomen:    Protuberant, bowel sounds present, soft, nontender   Rectal:     Deferred   Extremities:    Right femoral catheterization site without hematoma.  Feet are warm and viable.  No pitting edema   Pulses:      Skin:  Warm and dry   Lymph nodes:    Neurologic:  Alert and oriented.  Left facial droop.  Tongue midline.  Follows commands.  No motor drift upper or lower extremities.  Fine motor activity is clumsy on the left upper extremity       Results Review:   Lab Results (last 24 hours)     ** No results found for the last 24 hours. **        Imaging Results (Last 24 Hours)     Procedure Component Value Units Date/Time    CT Cerebral Perfusion With & Without Contrast [[id number]] Collected:  03/11/20 1135     Updated:  03/11/20 1200    Narrative:       EXAMINATION: CT CEREBRAL PERFUSION W WO CONTRAST-, CT HEAD WO CONTRAST-   03/11/2020     INDICATION: TIA, initial screening     TECHNIQUE: 5 mm unenhanced images through the brain. Subsequently,  cerebral perfusion analysis was performed using computed  tomography with  contrast administration, including postprocessing of parametric mass  with determination of cerebral blood flow, cerebral blood volume and  mean transit time.       The radiation dose reduction device was turned on for each scan per the  ALARA (As Low as Reasonably Achievable) protocol.     COMPARISON: Outside CT scan of 02/05/2015.     FINDINGS: HEAD CT SCAN WITHOUT CONTRAST: The calvarium appears intact.  Included paranasal sinuses and mastoids appear clear. Soft tissue window  images show low attenuation change in the right frontal white matter and  some loss of the gray/white matter interface over an at least 6 cm area.  The patient appears to have some residual intravascular contrast, but  allowing for this, no evidence of hemorrhage is identified. There is no  evidence of infarct elsewhere, no evidence of mass, mass effect,  hydrocephalus or abnormal extra-axial collection.       Impression:       1. Approximately 6 mm area of low attenuation in the right frontal lobe,  consistent with developing MCA territory edema/infarct.       2. Suggestion of residual intravascular contrast from an outside study.  Allowing for this, no evidence of hemorrhage is seen. No evidence of  other acute intracranial disease elsewhere.     CEREBRAL PERFUSION SCAN WITH AND WITHOUT CONTRAST: Rapid analysis shows  the area of developing edema on the unenhanced CT scan to contain an  area of cerebral blood flow less than 30% calculated at 29 mm consistent  with core infarct. Mapping for Tmax greater than six seconds shows a  larger area of potential ischemic penumbra, estimated at 60 mm with  mismatch volume of 30 mL. The individual imaging runs for Tmax, mean  transit time, time to drain and cerebral blood flow all show similar  sized abnormalities, with a significantly smaller central area on  cerebral blood volume, consistent with core infarct. No significant  asymmetry of perfusion is appreciated elsewhere.      IMPRESSION: Anterior right MCA territory infarct, with moderate  surrounding ischemic penumbra, as discussed above. No evidence of  ischemia/infarct elsewhere.     D:  03/11/2020  E:  03/11/2020       CT Head Without Contrast [[id number]] Collected:  03/11/20 1135     Updated:  03/11/20 1200    Narrative:       EXAMINATION: CT CEREBRAL PERFUSION W WO CONTRAST-, CT HEAD WO CONTRAST-   03/11/2020     INDICATION: TIA, initial screening     TECHNIQUE: 5 mm unenhanced images through the brain. Subsequently,  cerebral perfusion analysis was performed using computed tomography with  contrast administration, including postprocessing of parametric mass  with determination of cerebral blood flow, cerebral blood volume and  mean transit time.       The radiation dose reduction device was turned on for each scan per the  ALARA (As Low as Reasonably Achievable) protocol.     COMPARISON: Outside CT scan of 02/05/2015.     FINDINGS: HEAD CT SCAN WITHOUT CONTRAST: The calvarium appears intact.  Included paranasal sinuses and mastoids appear clear. Soft tissue window  images show low attenuation change in the right frontal white matter and  some loss of the gray/white matter interface over an at least 6 cm area.  The patient appears to have some residual intravascular contrast, but  allowing for this, no evidence of hemorrhage is identified. There is no  evidence of infarct elsewhere, no evidence of mass, mass effect,  hydrocephalus or abnormal extra-axial collection.       Impression:       1. Approximately 6 mm area of low attenuation in the right frontal lobe,  consistent with developing MCA territory edema/infarct.       2. Suggestion of residual intravascular contrast from an outside study.  Allowing for this, no evidence of hemorrhage is seen. No evidence of  other acute intracranial disease elsewhere.     CEREBRAL PERFUSION SCAN WITH AND WITHOUT CONTRAST: Rapid analysis shows  the area of developing edema on the unenhanced  CT scan to contain an  area of cerebral blood flow less than 30% calculated at 29 mm consistent  with core infarct. Mapping for Tmax greater than six seconds shows a  larger area of potential ischemic penumbra, estimated at 60 mm with  mismatch volume of 30 mL. The individual imaging runs for Tmax, mean  transit time, time to drain and cerebral blood flow all show similar  sized abnormalities, with a significantly smaller central area on  cerebral blood volume, consistent with core infarct. No significant  asymmetry of perfusion is appreciated elsewhere.     IMPRESSION: Anterior right MCA territory infarct, with moderate  surrounding ischemic penumbra, as discussed above. No evidence of  ischemia/infarct elsewhere.     D:  03/11/2020  E:  03/11/2020              PROBLEM LIST  Patient Active Problem List   Diagnosis   • Dizziness   • PVC (premature ventricular contraction)   • Smoker   • Palpitations   • Dyslipidemia on statin    • Essential hypertension   • R MCA AIS s/p tPA and thrombectomy    • Tissue plasminogen activator (tPA) administered at other facility within 24 hours prior to current admission       Assessment/Plan      #1 right middle cerebral artery, anterior segment stroke status post TPA and mechanical thrombectomy.  She does not have motor weakness.  She does have a facial droop.  Speech is mildly dysarthric.  Risk factors include high blood pressure, dyslipidemia, tobacco abuse.    #2 recent Holter monitor with PVCs and one episode of nonsustained ventricular tachycardia.  No previous history of MI.  Scheduled for heart catheterization to evaluate arrhythmias.  She currently does not have chest pain or acute ischemic changes.    #3 tobacco abuse, she does have an as needed albuterol inhaler.  She does need a screening CAT scan of the chest to look for lung cancer.  A chest x-ray might be reasonable.  Currently she has no purulent sputum or active bronchospasm.      1. Admit to ICU   2. STAT CTH/CTP  (done)   3. Implement post-tPA ischemic stroke order set   4. Neurovascular checks per protocol   5. Nicardipine as needed   6. Echo with agitated saline   7. Carotid duplex   8. 24hr post-tPA CT head tomorrow @ 1000   9. Chest x-ray  10. PRN nebulized bronchodilators  11. If she passes her swallow evaluation resume sotalol  12. Monitor electrolytes and replace as needed    I discussed the patients findings and my recommendations with multidisciplinary team    Evelyn Martinez, APRN, AGACNP-BC, FNP-BC   Pulmonary and Critical Care     I personally reviewed her records, interviewed the patient and her daughter.  Performed the above physical examination.  Reviewed her laboratory data.  Looked at her EKG and CT scans.  I formulated the assessment and then modified the plan and note to reflect my additions and findings    Lenora Coto MD      Time:40min    This note was produced with a voice recognition program and may have uncorrected errors.       Electronically signed by Lenora Coto MD at 20 1458       Physical Therapy Notes (most recent note)    No notes exist for this encounter.            Occupational Therapy Notes (most recent note)      Mary Freeman, ABRIL at 20 1031          Acute Care - Occupational Therapy Initial Evaluation  [facility]     Patient Name: Maria Luz Wade  : 1945  MRN: [id number]  Today's Date: 3/13/2020             Admit Date: 3/11/2020       ICD-10-CM ICD-9-CM   1. Dysphagia, unspecified type R13.10 787.20   2. Cognitive communication deficit R41.841 799.52     Patient Active Problem List   Diagnosis   • Dizziness   • PVC (premature ventricular contraction)   • Smoker   • Palpitations   • Dyslipidemia on statin    • Essential hypertension   • R MCA AIS s/p tPA and thrombectomy    • Tissue plasminogen activator (tPA) administered at other facility within 24 hours prior to current admission   • Intracranial bleed (CMS/HCC); hemorrhagic conversion of an  ischemic stroke     Past Medical History:   Diagnosis Date   • History of tonsillectomy 1951   • History of tubal ligation    • Hyperlipidemia      Past Surgical History:   Procedure Laterality Date   • CARDIOVASCULAR STRESS TEST  11/30/2017    L.Myoview- R/O Anterior Ischemia.   • CONVERTED (HISTORICAL) HOLTER  02/12/2020    AVG 77. . 6.8% PVC. one 6 beats run of V-Tach   • ECHO - CONVERTED  11/30/2017    EF 65%. Mild-Mod MR   • INTERVENTIONAL RADIOLOGY PROCEDURE Bilateral 3/11/2020    Procedure: CAROTID CEREBRAL ANGIOGRAM BILATERAL;  Surgeon: Adryan Carter MD;  Location:  [facility];  Service: Interventional Radiology;  Laterality: Bilateral;   • OTHER SURGICAL HISTORY  2015    CT Scan- 60% (L) ICA. 50% (R) ICA   •  CAROTID UNILATERAL  11/16/2017    @ [facility]. No sig stenosis          OT ASSESSMENT FLOWSHEET (last 12 hours)      Occupational Therapy Evaluation     Row Name 03/13/20 1031                   OT Evaluation Time/Intention    Subjective Information  no complaints  -CL        Document Type  evaluation  -CL        Mode of Treatment  occupational therapy  -CL        Patient Effort  good  -CL           General Information    Patient Profile Reviewed?  yes  -CL        Prior Level of Function  independent:;all household mobility;transfer;ADL's;dressing;bathing  -CL        Equipment Currently Used at Home  hospital bed;commode, bedside;walker, rolling stair lift, tub lift seat  -CL        Existing Precautions/Restrictions  fall;other (see comments) L sided field cut/inattention, L sided weakness  -CL        Barriers to Rehab  none identified  -CL           Relationship/Environment    Lives With  child(farzad), adult daughter  -CL           Resource/Environmental Concerns    Current Living Arrangements  home/apartment/condo  -CL           Cognitive Assessment/Interventions    Additional Documentation  Cognitive Assessment/Intervention (Group)  -CL           Cognitive  Assessment/Intervention- PT/OT    Affect/Mental Status (Cognitive)  confused  -CL        Orientation Status (Cognition)  oriented to;person;time;verbal cues/prompts needed for orientation;place  -CL        Follows Commands (Cognition)  WFL  -CL        Cognitive Function (Cognitive)  WFL  -CL           Bed Mobility Assessment/Treatment    Comment (Bed Mobility)  UIC.   -CL           Transfer Assessment/Treatment    Comment (Transfers)  Defer to PT  -CL           ADL Assessment/Intervention    52879 - OT Self Care/Mgmt Minutes  10  -CL        BADL Assessment/Intervention  feeding  -CL           Self-Feeding Assessment/Training    [place] Level (Feeding)  liquids to mouth;scoop food and bring to mouth;supervision  -CL        Assistive Devices (Feeding)  adapted cup;built-up handle utensils  -CL        Position (Self-Feeding)  supported sitting  -CL           General ROM    GENERAL ROM COMMENTS  BUE grossly WFL.   -CL           MMT (Manual Muscle Testing)    General MMT Comments  RUE grossly 4-/5. LUE shldr F 3+/5, bicep/tricep 3+/5,  3+/5  -CL           Motor Assessment/Interventions    Additional Documentation  Balance (Group);Therapeutic Exercise (Group);Gross Motor Coordination (Group)  -CL           Gross Motor Coordination    Gross Motor Impairments  finger to nose  -CL        Gross Motor Skill, Impairments Detail  LUE moderately impaired  -CL           Balance    Balance  static sitting balance;dynamic sitting balance  -CL           Static Sitting Balance    Level of [place] (Unsupported Sitting, Static Balance)  supervision  -CL        Sitting Position (Unsupported Sitting, Static Balance)  sitting in chair  -CL           Dynamic Sitting Balance    Level of [place], Reaches Outside Midline (Sitting, Dynamic Balance)  supervision  -CL        Sitting Position, Reaches Outside Midline (Sitting, Dynamic Balance)  sitting in chair  -CL           Sensory Assessment/Intervention    Sensory General  Assessment  light touch sensation deficits identified  -CL        Additional Documentation  Vision Assessment/Intervention (Group)  -CL           Light Touch Sensation Assessment    Left Upper Extremity: Light Touch Sensation Assessment  moderate impairment, 50 to 74% correct responses  -CL        Right Upper Extremity: Light Touch Sensation Assessment  moderate impairment, 50 to 74% correct responses;other (see comments) CTS  -CL           Vision Assessment/Intervention    Visual Impairment/Limitations  peripheral vision impaired right  -CL        Visual Processing Deficit  visual attention, right  -CL           Positioning and Restraints    Pre-Treatment Position  sitting in chair/recliner  -CL        Post Treatment Position  chair  -CL        In Chair  notified nsg;sitting;call light within reach;encouraged to call for assist;with PT;with family/caregiver transitioned to PT treatment  -CL           Pain Scale: Numbers Pre/Post-Treatment    Pain Scale: Numbers, Pretreatment  0/10 - no pain  -CL        Pain Scale: Numbers, Post-Treatment  0/10 - no pain  -CL           Clinical Impression (OT)    OT Diagnosis  Decreased independence in ADLs.   -CL        Patient/Family Goals Statement (OT Eval)  Return to PLOF.   -CL        Criteria for Skilled Therapeutic Interventions Met (OT Eval)  yes;treatment indicated  -CL        Rehab Potential (OT Eval)  good, to achieve stated therapy goals  -CL        Therapy Frequency (OT Eval)  daily  -CL        Anticipated Equipment Needs at Discharge (OT)  -- TBA further  -CL        Anticipated Discharge Disposition (OT)  inpatient rehabilitation facility  -CL           Vital Signs    Pre Systolic BP Rehab  138  -CL        Pre Treatment Diastolic BP  99  -CL        Pretreatment Heart Rate (beats/min)  87  -CL        Pre SpO2 (%)  93  -CL        O2 Delivery Pre Treatment  room air  -CL        Pre Patient Position  Sitting  -CL        Intra Patient Position  Sitting  -CL        Post  Patient Position  Sitting  -CL           OT Goals    Transfer Goal Selection (OT)  --  -CL        Dressing Goal Selection (OT)  dressing, OT goal 1  -CL        Toileting Goal Selection (OT)  toileting, OT goal 1  -CL        Self-Feeding Goal Selection (OT)  self feeding, OT goal 1  -CL        Safety Awareness Goal Selection (OT)  safety awareness, OT goal 1  -CL        Additional Documentation  Self-Feeding Goal Selection (OT) (Row);Safety Awareness Goal Selection (OT) (Row)  -CL           Dressing Goal 1 (OT)    Activity/Assistive Device (Dressing Goal 1, OT)  lower body dressing don socks/pants  -CL        [place]/Cues Needed (Dressing Goal 1, OT)  supervision required  -CL        Time Frame (Dressing Goal 1, OT)  long term goal (LTG);10 days  -CL        Progress/Outcome (Dressing Goal 1, OT)  goal ongoing  -CL           Toileting Goal 1 (OT)    Activity/Device (Toileting Goal 1, OT)  adjust/manage clothing;perform perineal hygiene  -CL        [place] Level/Cues Needed (Toileting Goal 1, OT)  supervision required  -CL        Time Frame (Toileting Goal 1, OT)  long term goal (LTG);10 days  -CL        Progress/Outcome (Toileting Goal 1, OT)  goal ongoing  -CL           Self-Feeding Goal 1 (OT)    Activity/Assistive Device (Self-Feeding Goal 1, OT)  liquids to mouth;scoop food and bring to mouth  -CL        [place] Level/Cues Needed (Self-Feeding Goal 1, OT)  supervision required  -CL        Time Frame (Self-Feeding Goal 1, OT)  long term goal (LTG);10 days  -CL        Progress/Outcomes (Self-Feeding Goal 1, OT)  goal ongoing  -CL           Safety Awareness Goal 1 (OT)    Activity (Safety Awareness Goal 1, OT)  awareness of left side  -CL        [place]/Cues/Accuracy (Safety Awareness Goal 1, OT)  with minimum;physical/tactile cues;verbal cues/redirection;with 90% accuracy;with additional processing time  -CL        Time Frame (Safety Awareness Goal 1, OT)  long term goal (LTG);10 days  -CL         Progress/Outcome (Safety Awareness Goal 1, OT)  goal ongoing  -CL          User Key  (r) = Recorded By, (t) = Taken By, (c) = Cosigned By    Initials Name Effective Dates    Mary Sheffield OT 04/03/18 -                OT Recommendation and Plan  Outcome Summary/Treatment Plan (OT)  Anticipated Equipment Needs at Discharge (OT): (TBA further)  Anticipated Discharge Disposition (OT): inpatient rehabilitation facility  Therapy Frequency (OT Eval): daily  Plan of Care Review  Plan of Care Reviewed With: patient  Plan of Care Reviewed With: patient  Outcome Summary: OT completed a brief chart review. Pt limited d/t L sided field cut and weakness. Pt educated on AE use to improve independence w/ self-feeding d/t B hand numbness. Recommend cont skilled IPOT POC. Recommend pt DC to IP rehab.    Outcome Measures     Row Name 03/13/20 1031             How much help from another is currently needed...    Putting on and taking off regular lower body clothing?  2  -CL      Bathing (including washing, rinsing, and drying)  2  -CL      Toileting (which includes using toilet bed pan or urinal)  2  -CL      Putting on and taking off regular upper body clothing  2  -CL      Taking care of personal grooming (such as brushing teeth)  2  -CL      Eating meals  3  -CL      AM-PAC 6 Clicks Score (OT)  13  -CL         Modified [place] Scale    Pre-Stroke Modified [place] Scale  0 - No Symptoms at all.  -CL      Modified [place] Scale  3 - Moderate disability.  Requiring some help, but able to walk without assistance.  -CL         Functional Assessment    Outcome Measure Options  AM-PAC 6 Clicks Daily Activity (OT);Modified [place]  -CL        User Key  (r) = Recorded By, (t) = Taken By, (c) = Cosigned By    Initials Name Provider Type    Mary Sheffield OT Occupational Therapist          Time Calculation:   Time Calculation- OT     Row Name 03/13/20 1031             Time Calculation- OT    OT Start Time  1031  -CL      OT Received On   03/13/20  -CL      OT Goal Re-Cert Due Date  03/23/20  -CL         Timed Charges    30806 - OT Self Care/Mgmt Minutes  10  -CL        User Key  (r) = Recorded By, (t) = Taken By, (c) = Cosigned By    Initials Name Provider Type     Mary Freeman OT Occupational Therapist        Therapy Charges for Today     Code Description Service Date Service Provider Modifiers Qty    31726324464 HC OT SELF CARE/MGMT/TRAIN EA 15 MIN 3/13/2020 Mary Freeman OT GO 1    [id number]  OT EVAL LOW COMPLEXITY 3 3/13/2020 Mary Freeman OT GO 1               Mary Freeman OT  3/13/2020    Electronically signed by Mary Freeman OT at 03/13/20 1201

## 2020-03-13 NOTE — PROGRESS NOTES
Critical Care Note     LOS: 2 days   Patient Care Team:  Sameera Davenport APRN as PCP - General (Family Medicine)    Chief Complaint/Reason for visit:      Right middle cerebral artery stroke  Hemorrhagic conversion  History of nonsustained V. Tach  Probable COPD      Subjective     74-year-old woman presenting on March 11 with left-sided weakness and dysarthria.  She received TPA.  Perfusion scan showed occlusion of her right middle cerebral artery branch and she underwent thrombectomy.  Initial NIH stroke scale here was a 9.  Postprocedure her motor weakness resolved.  She had some persistent facial weakness.  She was more lethargic March 12 morning and underwent a CT scan that revealed hemorrhagic conversion.  She had an abnormal Holter monitor with frequent PVCs and a 9 beat run of ventricular tachycardia.  She was scheduled for an outpatient heart catheterization.  She does not have angina.    Interval History:     She complained of a headache all night.  Denies nausea or vomiting.  She is currently receiving IV Lopressor  Room air saturation 94%.  1900 mL's of urine plus incontinence  NIH stroke score is a 5.  She denies weakness but her left arm is clumsy  CT scan was read as slight worsening of midline shift.  She does not have chest pain or palpitations    Review of Systems:    All systems were reviewed and negative except as noted in subjective.    Medical history, surgical history, social history, family history reviewed    Objective     Intake/Output:    Intake/Output Summary (Last 24 hours) at 3/13/2020 1208  Last data filed at 3/13/2020 0810  Gross per 24 hour   Intake 671.94 ml   Output 1900 ml   Net -1228.06 ml       Nutrition:  Diet Dysphagia; IV - Mechanical Soft No Mixed Consistencies; Nectar / Syrup Thick; Cardiac, Consistent Carbohydrate    Infusions:    niCARdipine 5-15 mg/hr Last Rate: Stopped (03/12/20 2300)       Telemetry: Sinus rhythm             Vital Signs  Blood pressure 130/72,  "pulse (P) 91, temperature 98.5 °F (36.9 °C), temperature source Oral, resp. rate (P) 20, height 160 cm (63\"), weight 53.3 kg (117 lb 8.1 oz), SpO2 (P) 93 %.    Physical Exam:  General Appearance:   Older white woman, alert   Head:   Atraumatic   Eyes:          Pupils reactive to light   Ears:     Throat:  Oral mucosa moist   Neck:  Trachea midline, no palpable thyroid   Back:      Lungs:    Symmetric chest expansion.  Breath sounds are bilateral without wheeze or rhonchi    Heart:   Regular rhythm, S1, S2 auscultated, no murmur   Abdomen:    Bowel sounds present, soft, nontender   Rectal:   Deferred   Extremities:  No pitting edema or clubbing   Pulses:    Skin:  Warm and dry   Lymph nodes:    Neurologic:  Alert, oriented.  Mild left facial droop.  Left upper extremity is clumsy, mildly weak with a drift.  Persistent dysarthria..      Results Review:     I reviewed the patient's new clinical results.   Results from last 7 days   Lab Units 03/12/20  1442 03/12/20  0329   SODIUM mmol/L  --  138   POTASSIUM mmol/L 4.2 3.1*   CHLORIDE mmol/L  --  104   CO2 mmol/L  --  20.0*   BUN mg/dL  --  8   CREATININE mg/dL  --  0.46*   CALCIUM mg/dL  --  8.8   GLUCOSE mg/dL  --  109*     Results from last 7 days   Lab Units 03/12/20  0328   WBC 10*3/mm3 11.87*   HEMOGLOBIN g/dL 13.9   HEMATOCRIT % 44.9   PLATELETS 10*3/mm3 227         No results found for: BLOODCX  No results found for: URINECX    I reviewed the patient's new imaging including images and reports.    FINDINGS:   The patient has a known right MCA territory infarct with evolving hemorrhagic transformation. The largest components of hemorrhage within the evolving infarct and surrounding area of vasogenic edema measure up to about 5.0 x 3.6 x 4.1 cm. There is  accompanying subarachnoid hemorrhage approaching the vertex, stable to less conspicuous than on the prior study. There is generalized edema throughout the right cerebral hemisphere. Compared to the prior " examination, interval subtle worsening of mass  effect and midline shift from right to left of about 3 mm. Subtle but increasing effacement of the right lateral ventricle. The fourth ventricle and third ventricle remain patent.     No distinct evidence of intraventricular extension of hemorrhage. The globes are intact. Bones are intact. Sinuses are clear.     Estimated volume of intracranial hemorrhage: 36 mL     IMPRESSION:     1. Right MCA territory infarct with hemorrhagic transformation. Interval subtle worsening of mass effect with midline shift from right to left measuring about 3 mm. Interval subtle worsening of mass effect and effacement of the right lateral ventricle.  Persistent edema throughout the right cerebral hemisphere with stable to interval decrease in overall volume of subarachnoid blood.  2. No new evidence of intraventricular extension of hemorrhage..        NOTIFICATION: Critical Value/emergent results were called by telephone at the time of interpretation on 3/13/2020 5:48 AM to RICKIE Aranda who verbally acknowledged these results.     Signer Name: Aleksandr Marquez MD   Signed: 3/13/2020 5:53 AM    All medications reviewed.     atorvastatin 80 mg Oral Nightly   ipratropium-albuterol 3 mL Nebulization 4x Daily - RT   metoprolol tartrate 5 mg Intravenous Q6H   nicotine 1 patch Transdermal Q24H         Assessment/Plan       R MCA AIS s/p tPA and thrombectomy     Tissue plasminogen activator (tPA) administered at other facility within 24 hours prior to current admission    Intracranial bleed (CMS/HCC); hemorrhagic conversion of an ischemic stroke    Smoker    Dyslipidemia on statin     Essential hypertension    #1 ischemic stroke, right middle cerebral artery status post TPA and thrombectomy.  March 12 she was more lethargic and CT scan confirmed hemorrhagic conversion.  While she is more somnolent, her right-sided weakness has not recurred.  She has persistent dysarthria, left upper extremity  weakness.  She was able to cooperate with speech therapy and passed her swallow evaluation with a dysphagia 4 diet.  However after her episode of lethargy yesterday she was made n.p.o.  Speech is currently reevaluating.    #2 hypertension changed to IV Lopressor because of her decreased alertness.  Was taking Norvasc, sotalol as an outpatient.    #3 history of tobacco use, currently without wheezing.  Oxygenation is adequate on 2 L nasal cannula    #4 evaluated by cardiology with plans for an outpatient heart catheterization secondary to multiple PVCs and one run of nonsustained V. tach.  Typically we do not do elective heart catheterizations in the hospital.  She is not having unstable angina or active arrhythmias currently.    PLAN:  Speech to evaluate swallowing  Continue IV Lopressor until she is cleared to swallow and then transition to her home sotalol versus oral metoprolol  Systolic blood pressure less than 140  Nebulized bronchodilators  Stop aspirin secondary to cranial bleed  Continue statin  PT/OT  Leave in the ICU for ongoing aggressive neuro monitoring given her slight worsening midline shift, hemorrhagic conversion      VTE Prophylaxis:SCDS    Stress Ulcer Prophylaxis: None    Lenora Coto MD  03/13/20  12:08      Time: 25min  I personally provided care to this critically ill patient as documented above.  Critical care time does not include time spent on separately billed procedures.  Non of my critical care time was concurrent with other critical care providers.

## 2020-03-13 NOTE — THERAPY EVALUATION
Acute Care - Occupational Therapy Initial Evaluation   [place]     Patient Name: Maria Luz Wade  : 1945  MRN: [id number]  Today's Date: 3/13/2020             Admit Date: 3/11/2020       ICD-10-CM ICD-9-CM   1. Dysphagia, unspecified type R13.10 787.20   2. Cognitive communication deficit R41.841 799.52     Patient Active Problem List   Diagnosis   • Dizziness   • PVC (premature ventricular contraction)   • Smoker   • Palpitations   • Dyslipidemia on statin    • Essential hypertension   • R MCA AIS s/p tPA and thrombectomy    • Tissue plasminogen activator (tPA) administered at other facility within 24 hours prior to current admission   • Intracranial bleed (CMS/HCC); hemorrhagic conversion of an ischemic stroke     Past Medical History:   Diagnosis Date   • History of tonsillectomy    • History of tubal ligation    • Hyperlipidemia      Past Surgical History:   Procedure Laterality Date   • CARDIOVASCULAR STRESS TEST  2017    L.Myoview- R/O Anterior Ischemia.   • CONVERTED (HISTORICAL) HOLTER  2020    AVG 77. . 6.8% PVC. one 6 beats run of V-Tach   • ECHO - CONVERTED  2017    EF 65%. Mild-Mod MR   • INTERVENTIONAL RADIOLOGY PROCEDURE Bilateral 3/11/2020    Procedure: CAROTID CEREBRAL ANGIOGRAM BILATERAL;  Surgeon: Adryan Carter MD;  Location: [facility] INVASIVE LOCATION;  Service: Interventional Radiology;  Laterality: Bilateral;   • OTHER SURGICAL HISTORY      CT Scan- 60% (L) ICA. 50% (R) ICA   • US CAROTID UNILATERAL  2017    @ [facility]. No sig stenosis          OT ASSESSMENT FLOWSHEET (last 12 hours)      Occupational Therapy Evaluation     Row Name 20 1031                   OT Evaluation Time/Intention    Subjective Information  no complaints  -CL        Document Type  evaluation  -CL        Mode of Treatment  occupational therapy  -CL        Patient Effort  good  -CL           General Information    Patient Profile Reviewed?  yes  -CL         Prior Level of Function  independent:;all household mobility;transfer;ADL's;dressing;bathing  -CL        Equipment Currently Used at Home  hospital bed;commode, bedside;walker, rolling stair lift, tub lift seat  -CL        Existing Precautions/Restrictions  fall;other (see comments) L sided field cut/inattention, L sided weakness  -CL        Barriers to Rehab  none identified  -CL           Relationship/Environment    Lives With  child(farzad), adult daughter  -CL           Resource/Environmental Concerns    Current Living Arrangements  home/apartment/condo  -CL           Cognitive Assessment/Interventions    Additional Documentation  Cognitive Assessment/Intervention (Group)  -CL           Cognitive Assessment/Intervention- PT/OT    Affect/Mental Status (Cognitive)  confused  -CL        Orientation Status (Cognition)  oriented to;person;time;verbal cues/prompts needed for orientation;place  -CL        Follows Commands (Cognition)  WFL  -CL        Cognitive Function (Cognitive)  WFL  -CL           Bed Mobility Assessment/Treatment    Comment (Bed Mobility)  UIC.   -CL           Transfer Assessment/Treatment    Comment (Transfers)  Defer to PT  -CL           ADL Assessment/Intervention    79345 - OT Self Care/Mgmt Minutes  10  -CL        BADL Assessment/Intervention  feeding  -CL           Self-Feeding Assessment/Training    [place] Level (Feeding)  liquids to mouth;scoop food and bring to mouth;supervision  -CL        Assistive Devices (Feeding)  adapted cup;built-up handle utensils  -CL        Position (Self-Feeding)  supported sitting  -CL           General ROM    GENERAL ROM COMMENTS  BUE grossly WFL.   -CL           MMT (Manual Muscle Testing)    General MMT Comments  RUE grossly 4-/5. LUE shldr F 3+/5, bicep/tricep 3+/5,  3+/5  -CL           Motor Assessment/Interventions    Additional Documentation  Balance (Group);Therapeutic Exercise (Group);Gross Motor Coordination (Group)  -CL           Gross Motor  Coordination    Gross Motor Impairments  finger to nose  -CL        Gross Motor Skill, Impairments Detail  LUE moderately impaired  -CL           Balance    Balance  static sitting balance;dynamic sitting balance  -CL           Static Sitting Balance    Level of [place] (Unsupported Sitting, Static Balance)  supervision  -CL        Sitting Position (Unsupported Sitting, Static Balance)  sitting in chair  -CL           Dynamic Sitting Balance    Level of [place], Reaches Outside Midline (Sitting, Dynamic Balance)  supervision  -CL        Sitting Position, Reaches Outside Midline (Sitting, Dynamic Balance)  sitting in chair  -CL           Sensory Assessment/Intervention    Sensory General Assessment  light touch sensation deficits identified  -CL        Additional Documentation  Vision Assessment/Intervention (Group)  -CL           Light Touch Sensation Assessment    Left Upper Extremity: Light Touch Sensation Assessment  moderate impairment, 50 to 74% correct responses  -CL        Right Upper Extremity: Light Touch Sensation Assessment  moderate impairment, 50 to 74% correct responses;other (see comments) CTS  -CL           Vision Assessment/Intervention    Visual Impairment/Limitations  peripheral vision impaired right  -CL        Visual Processing Deficit  visual attention, right  -CL           Positioning and Restraints    Pre-Treatment Position  sitting in chair/recliner  -CL        Post Treatment Position  chair  -CL        In Chair  notified nsg;sitting;call light within reach;encouraged to call for assist;with PT;with family/caregiver transitioned to PT treatment  -CL           Pain Scale: Numbers Pre/Post-Treatment    Pain Scale: Numbers, Pretreatment  0/10 - no pain  -CL        Pain Scale: Numbers, Post-Treatment  0/10 - no pain  -CL           Clinical Impression (OT)    OT Diagnosis  Decreased independence in ADLs.   -CL        Patient/Family Goals Statement (OT Eval)  Return to PLOF.   -CL         Criteria for Skilled Therapeutic Interventions Met (OT Eval)  yes;treatment indicated  -CL        Rehab Potential (OT Eval)  good, to achieve stated therapy goals  -CL        Therapy Frequency (OT Eval)  daily  -CL        Anticipated Equipment Needs at Discharge (OT)  -- TBA further  -CL        Anticipated Discharge Disposition (OT)  inpatient rehabilitation facility  -CL           Vital Signs    Pre Systolic BP Rehab  138  -CL        Pre Treatment Diastolic BP  99  -CL        Pretreatment Heart Rate (beats/min)  87  -CL        Pre SpO2 (%)  93  -CL        O2 Delivery Pre Treatment  room air  -CL        Pre Patient Position  Sitting  -CL        Intra Patient Position  Sitting  -CL        Post Patient Position  Sitting  -CL           OT Goals    Transfer Goal Selection (OT)  --  -CL        Dressing Goal Selection (OT)  dressing, OT goal 1  -CL        Toileting Goal Selection (OT)  toileting, OT goal 1  -CL        Self-Feeding Goal Selection (OT)  self feeding, OT goal 1  -CL        Safety Awareness Goal Selection (OT)  safety awareness, OT goal 1  -CL        Additional Documentation  Self-Feeding Goal Selection (OT) (Row);Safety Awareness Goal Selection (OT) (Row)  -CL           Dressing Goal 1 (OT)    Activity/Assistive Device (Dressing Goal 1, OT)  lower body dressing don socks/pants  -CL        [place]/Cues Needed (Dressing Goal 1, OT)  supervision required  -CL        Time Frame (Dressing Goal 1, OT)  long term goal (LTG);10 days  -CL        Progress/Outcome (Dressing Goal 1, OT)  goal ongoing  -CL           Toileting Goal 1 (OT)    Activity/Device (Toileting Goal 1, OT)  adjust/manage clothing;perform perineal hygiene  -CL        [place] Level/Cues Needed (Toileting Goal 1, OT)  supervision required  -CL        Time Frame (Toileting Goal 1, OT)  long term goal (LTG);10 days  -CL        Progress/Outcome (Toileting Goal 1, OT)  goal ongoing  -CL           Self-Feeding Goal 1 (OT)    Activity/Assistive  Device (Self-Feeding Goal 1, OT)  liquids to mouth;scoop food and bring to mouth  -CL        [place] Level/Cues Needed (Self-Feeding Goal 1, OT)  supervision required  -CL        Time Frame (Self-Feeding Goal 1, OT)  long term goal (LTG);10 days  -CL        Progress/Outcomes (Self-Feeding Goal 1, OT)  goal ongoing  -CL           Safety Awareness Goal 1 (OT)    Activity (Safety Awareness Goal 1, OT)  awareness of left side  -CL        [place]/Cues/Accuracy (Safety Awareness Goal 1, OT)  with minimum;physical/tactile cues;verbal cues/redirection;with 90% accuracy;with additional processing time  -CL        Time Frame (Safety Awareness Goal 1, OT)  long term goal (LTG);10 days  -CL        Progress/Outcome (Safety Awareness Goal 1, OT)  goal ongoing  -CL          User Key  (r) = Recorded By, (t) = Taken By, (c) = Cosigned By    Initials Name Effective Dates    CL Mary Freeman, OT 04/03/18 -                OT Recommendation and Plan  Outcome Summary/Treatment Plan (OT)  Anticipated Equipment Needs at Discharge (OT): (TBA further)  Anticipated Discharge Disposition (OT): inpatient rehabilitation facility  Therapy Frequency (OT Eval): daily  Plan of Care Review  Plan of Care Reviewed With: patient  Plan of Care Reviewed With: patient  Outcome Summary: OT completed a brief chart review. Pt limited d/t L sided field cut and weakness. Pt educated on AE use to improve independence w/ self-feeding d/t B hand numbness. Recommend cont skilled IPOT POC. Recommend pt DC to IP rehab.    Outcome Measures     Row Name 03/13/20 1031             How much help from another is currently needed...    Putting on and taking off regular lower body clothing?  2  -CL      Bathing (including washing, rinsing, and drying)  2  -CL      Toileting (which includes using toilet bed pan or urinal)  2  -CL      Putting on and taking off regular upper body clothing  2  -CL      Taking care of personal grooming (such as brushing teeth)  2  -CL       Eating meals  3  -CL      AM-PAC 6 Clicks Score (OT)  13  -CL         Modified [place] Scale    Pre-Stroke Modified [place] Scale  0 - No Symptoms at all.  -CL      Modified [place] Scale  3 - Moderate disability.  Requiring some help, but able to walk without assistance.  -CL         Functional Assessment    Outcome Measure Options  AM-PAC 6 Clicks Daily Activity (OT);Modified [place]  -CL        User Key  (r) = Recorded By, (t) = Taken By, (c) = Cosigned By    Initials Name Provider Type    CL Mary Freeman OT Occupational Therapist          Time Calculation:   Time Calculation- OT     Row Name 03/13/20 1031             Time Calculation- OT    OT Start Time  1031  -CL      OT Received On  03/13/20  -CL      OT Goal Re-Cert Due Date  03/23/20  -CL         Timed Charges    40868 - OT Self Care/Mgmt Minutes  10  -CL        User Key  (r) = Recorded By, (t) = Taken By, (c) = Cosigned By    Initials Name Provider Type    CL Mary Freeman OT Occupational Therapist        Therapy Charges for Today     Code Description Service Date Service Provider Modifiers Qty    12277967756 HC OT SELF CARE/MGMT/TRAIN EA 15 MIN 3/13/2020 Mary Freeman OT GO 1    10686821521 HC OT EVAL LOW COMPLEXITY 3 3/13/2020 Mary Freeman OT GO 1               Mary Freeman OT  3/13/2020

## 2020-03-13 NOTE — THERAPY EVALUATION
Patient Name: Maria Luz Wade  : 1945    MRN: [id number]                              Today's Date: 3/13/2020       Admit Date: 3/11/2020    Visit Dx:     ICD-10-CM ICD-9-CM   1. Dysphagia, unspecified type R13.10 787.20   2. Cognitive communication deficit R41.841 799.52     Patient Active Problem List   Diagnosis   • Dizziness   • PVC (premature ventricular contraction)   • Smoker   • Palpitations   • Dyslipidemia on statin    • Essential hypertension   • R MCA AIS s/p tPA and thrombectomy    • Tissue plasminogen activator (tPA) administered at other facility within 24 hours prior to current admission   • Intracranial bleed (CMS/HCC); hemorrhagic conversion of an ischemic stroke     Past Medical History:   Diagnosis Date   • History of tonsillectomy    • History of tubal ligation    • Hyperlipidemia      Past Surgical History:   Procedure Laterality Date   • CARDIOVASCULAR STRESS TEST  2017    L.Myoview- R/O Anterior Ischemia.   • CONVERTED (HISTORICAL) HOLTER  2020    AVG 77. . 6.8% PVC. one 6 beats run of V-Tach   • ECHO - CONVERTED  2017    EF 65%. Mild-Mod MR   • INTERVENTIONAL RADIOLOGY PROCEDURE Bilateral 3/11/2020    Procedure: CAROTID CEREBRAL ANGIOGRAM BILATERAL;  Surgeon: Adryan Carter MD;  Location: [facility] INVASIVE LOCATION;  Service: Interventional Radiology;  Laterality: Bilateral;   • OTHER SURGICAL HISTORY      CT Scan- 60% (L) ICA. 50% (R) ICA   • US CAROTID UNILATERAL  2017    @ [facility]. No sig stenosis     General Information     Row Name 20 1149          PT Evaluation Time/Intention    Document Type  evaluation  -KR     Mode of Treatment  physical therapy  -KR     Row Name 20 1149          General Information    Patient Profile Reviewed?  yes  -KR     Prior Level of Function  independent:;all household mobility;gait;transfer;ADL's;dressing;bathing  -KR     Existing Precautions/Restrictions  fall;other (see comments) L sided  weakness/sensory deficits; L visual field cut  -KR     Barriers to Rehab  visual deficit  -KR     Row Name 03/13/20 1149          Relationship/Environment    Lives With  child(farzad), adult  -KR     Row Name 03/13/20 1149          Resource/Environmental Concerns    Current Living Arrangements  home/apartment/condo  -KR     Row Name 03/13/20 1149          Home Main Entrance    Number of Stairs, Main Entrance  none  -KR     Row Name 03/13/20 1149          Stairs Within Home, Primary    Number of Stairs, Within Home, Primary  none  -KR     Row Name 03/13/20 1149          Cognitive Assessment/Intervention- PT/OT    Orientation Status (Cognition)  oriented x 3  -KR     Row Name 03/13/20 1149          Safety Issues, Functional Mobility    Safety Issues Affecting Function (Mobility)  awareness of need for assistance;insight into deficits/self awareness;safety precaution awareness;safety precautions follow-through/compliance  -KR     Impairments Affecting Function (Mobility)  balance;coordination;endurance/activity tolerance;strength;visual/perceptual  -KR       User Key  (r) = Recorded By, (t) = Taken By, (c) = Cosigned By    Initials Name Provider Type    Pham Zelaya, PT Physical Therapist        Mobility     Row Name 03/13/20 1150          Bed Mobility Assessment/Treatment    Comment (Bed Mobility)  UIC  -KR     Row Name 03/13/20 1150          Transfer Assessment/Treatment    Comment (Transfers)  VC's for sequencing and hand placement.   -KR     Row Name 03/13/20 1150          Sit-Stand Transfer    Sit-Stand [place] (Transfers)  minimum assist (75% patient effort);verbal cues  -KR     Assistive Device (Sit-Stand Transfers)  other (see comments) L UE support  -KR     Row Name 03/13/20 1150          Gait/Stairs Assessment/Training    Gait/Stairs Assessment/Training  gait/ambulation independence  -KR     [place] Level (Gait)  minimum assist (75% patient effort);1 person assist;1 person to manage  equipment;verbal cues  -KR     Assistive Device (Gait)  other (see comments) L UE support  -KR     Distance in Feet (Gait)  200  -KR     Pattern (Gait)  step-to  -KR     Deviations/Abnormal Patterns (Gait)  base of support, narrow;shad decreased;stride length decreased  -KR     Bilateral Gait Deviations  forward flexed posture;heel strike decreased  -KR     Left Sided Gait Deviations  weight shift ability decreased  -KR     Comment (Gait/Stairs)  Pt demonstrated step to gait pattern with slow shad and decreased step length. Pt with periods of unsteady gait; one near LOB during turn. Required UE support for increased stability.   -KR       User Key  (r) = Recorded By, (t) = Taken By, (c) = Cosigned By    Initials Name Provider Type    Pham Zelaya, PT Physical Therapist        Obj/Interventions     Row Name 03/13/20 1152          General ROM    GENERAL ROM COMMENTS  BLE WFL   -KR     Row Name 03/13/20 1152          MMT (Manual Muscle Testing)    General MMT Comments  RLE grossly 4/5; LLE grossly 3+/5  -KR     Row Name 03/13/20 1152          Static Sitting Balance    Level of [place] (Unsupported Sitting, Static Balance)  supervision  -KR     Sitting Position (Unsupported Sitting, Static Balance)  sitting in chair  -KR     Row Name 03/13/20 1152          Static Standing Balance    Level of [place] (Supported Standing, Static Balance)  contact guard assist  -KR     Assistive Device Utilized (Supported Standing, Static Balance)  other (see comments) UE support  -KR     Row Name 03/13/20 1152          Dynamic Standing Balance    Level of [place], Reaches Outside Midline (Standing, Dynamic Balance)  minimal assist, 75% patient effort  -KR     Assistive Device Utilized (Supported Standing, Dynamic Balance)  other (see comments) UE support  -KR     Row Name 03/13/20 1152          Light Touch Sensation Assessment    Left Lower Extremity: Light Touch Sensation Assessment  severe impairment, less  than 50% correct responses  -KR     Right Lower Extremity: Light Touch Sensation Assessment  intact  -KR       User Key  (r) = Recorded By, (t) = Taken By, (c) = Cosigned By    Initials Name Provider Type    Pham Zelaya, PT Physical Therapist        Goals/Plan     Row Name 03/13/20 1459          Bed Mobility Goal 1 (PT)    Activity/Assistive Device (Bed Mobility Goal 1, PT)  sit to supine;supine to sit  -KR     [place] Level/Cues Needed (Bed Mobility Goal 1, PT)  minimum assist (75% or more patient effort)  -KR     Time Frame (Bed Mobility Goal 1, PT)  2 weeks  -KR     Progress/Outcomes (Bed Mobility Goal 1, PT)  goal ongoing  -KR     Row Name 03/13/20 1459          Transfer Goal 1 (PT)    Activity/Assistive Device (Transfer Goal 1, PT)  sit-to-stand/stand-to-sit;bed-to-chair/chair-to-bed  -KR     [place] Level/Cues Needed (Transfer Goal 1, PT)  contact guard assist  -KR     Progress/Outcome (Transfer Goal 1, PT)  goal ongoing  -KR     Row Name 03/13/20 1459          Gait Training Goal 1 (PT)    Activity/Assistive Device (Gait Training Goal 1, PT)  gait (walking locomotion)  -KR     [place] Level (Gait Training Goal 1, PT)  contact guard assist  -KR     Distance (Gait Goal 1, PT)  300 feet  -KR     Time Frame (Gait Training Goal 1, PT)  2 weeks  -KR     Progress/Outcome (Gait Training Goal 1, PT)  goal ongoing  -KR       User Key  (r) = Recorded By, (t) = Taken By, (c) = Cosigned By    Initials Name Provider Type    Pham Zelaya, PT Physical Therapist        Clinical Impression     Row Name 03/13/20 1151          Pain Assessment    Additional Documentation  Pain Scale: Numbers Pre/Post-Treatment (Group)  -KR     Row Name 03/13/20 6362          Pain Scale: Numbers Pre/Post-Treatment    Pain Scale: Numbers, Pretreatment  0/10 - no pain  -KR     Pain Scale: Numbers, Post-Treatment  0/10 - no pain  -KR     Row Name 03/13/20 4305          Physical Therapy Clinical Impression    Patient/Family  Goals Statement (PT Clinical Impression)  return to PLOF  -KR     Criteria for Skilled Interventions Met (PT Clinical Impression)  yes;treatment indicated  -KR     Rehab Potential (PT Clinical Summary)  good, to achieve stated therapy goals  -KR     Row Name 03/13/20 1153          Vital Signs    Pre Systolic BP Rehab  138  -KR     Pre Treatment Diastolic BP  99  -KR     Post Systolic BP Rehab  133  -KR     Post Treatment Diastolic BP  79  -KR     Pretreatment Heart Rate (beats/min)  93  -KR     Posttreatment Heart Rate (beats/min)  93  -KR     Pre SpO2 (%)  94  -KR     O2 Delivery Pre Treatment  room air  -KR     Post SpO2 (%)  92  -KR     O2 Delivery Post Treatment  room air  -KR     Pre Patient Position  Sitting  -KR     Intra Patient Position  Standing  -KR     Post Patient Position  Sitting  -KR     Row Name 03/13/20 1153          Positioning and Restraints    Pre-Treatment Position  sitting in chair/recliner  -KR     Post Treatment Position  chair  -KR     In Chair  notified nsg;reclined;call light within reach;encouraged to call for assist;with family/caregiver;RUE elevated;LUE elevated;legs elevated  -KR       User Key  (r) = Recorded By, (t) = Taken By, (c) = Cosigned By    Initials Name Provider Type    Pham Zelaya, PT Physical Therapist        Outcome Measures     Row Name 03/13/20 1154          How much help from another person do you currently need...    Turning from your back to your side while in flat bed without using bedrails?  3  -KR     Moving from lying on back to sitting on the side of a flat bed without bedrails?  2  -KR     Moving to and from a bed to a chair (including a wheelchair)?  3  -KR     Standing up from a chair using your arms (e.g., wheelchair, bedside chair)?  3  -KR     Climbing 3-5 steps with a railing?  2  -KR     To walk in hospital room?  3  -KR     AM-PAC 6 Clicks Score (PT)  16  -KR     Row Name 03/13/20 1500          Modified [place] Scale    Pre-Stroke Modified [place]  Scale  0 - No Symptoms at all.  -KR     Modified [place] Scale  3 - Moderate disability.  Requiring some help, but able to walk without assistance.  -MERIVN     Row Name 03/13/20 1154          Functional Assessment    Outcome Measure Options  AM-PAC 6 Clicks Basic Mobility (PT);Modified [place]  -KR       User Key  (r) = Recorded By, (t) = Taken By, (c) = Cosigned By    Initials Name Provider Type    Pham Zelaya PT Physical Therapist          PT Recommendation and Plan  Planned Therapy Interventions (PT Eval): balance training, bed mobility training, gait training, strengthening, transfer training  Outcome Summary/Treatment Plan (PT)  Anticipated Discharge Disposition (PT): inpatient rehabilitation facility  Plan of Care Reviewed With: patient  Outcome Summary: PT initial evaluation completed for pt s/p R MCA CVA presenting with L sided weakness, impaired balance, and decreased functional mobility. Pt ambulated 200ft with Penelope 1+1. Pt's decreased independence warrants PT skilled care. Recommend D/C to IP rehab facility.     Time Calculation:   PT Charges     Row Name 03/13/20 1047             Time Calculation    Start Time  1047  -KR      PT Received On  03/13/20  -MERVIN      PT Goal Re-Cert Due Date  03/23/20  -MERVIN        User Key  (r) = Recorded By, (t) = Taken By, (c) = Cosigned By    Initials Name Provider Type    Pham Zelaya PT Physical Therapist        Therapy Charges for Today     Code Description Service Date Service Provider Modifiers Qty    [id number]  PT EVAL MOD COMPLEXITY 4 3/13/2020 Pham Winchester, PT GP 1    [id number]  PT THER SUPP EA 15 MIN 3/13/2020 Pham Winchester PT GP 2          PT G-Codes  Outcome Measure Options: AM-PAC 6 Clicks Basic Mobility (PT), Modified [place]  AM-PAC 6 Clicks Score (PT): 16  AM-PAC 6 Clicks Score (OT): 13  Modified [place] Scale: 3 - Moderate disability.  Requiring some help, but able to walk without assistance.    Kanika Winchester PT  3/13/2020

## 2020-03-13 NOTE — PROGRESS NOTES
Continued Stay Note  [facility]     Patient Name: Maria Luz Wade  MRN: [id number]  Today's Date: 3/13/2020    Admit Date: 3/11/2020    Discharge Plan     Row Name 03/13/20 1040       Plan    Plan  Update    Plan Comments  P.T. /OT is going to see patient today. CM will follow to access for HH vs inpt rehab.    P.T. Recommends inpt rehab. Met with family at  they are requesting [facility] inpt rehab. Spoke with Y[facility] and faxed information.        Discharge Codes    No documentation.       Expected Discharge Date and Time     Expected Discharge Date Expected Discharge Time    Mar 16, 2020             Lenora Pappas RN

## 2020-03-13 NOTE — SIGNIFICANT NOTE
Radiologist called regarding AM CT head result.     Patient was admitted on 3/11 for management of R MCA ischemic stroke s/p tPA and mechanical thrombectomy who developed hemorrhagic conversion. AM CT shows known evolving hemorrhagic conversion with subtle worsening of mass effect with new 3mm leftward midline shift and effacement of the right lateral ventricle. Neurosurgery is following proceeding with medical management as it was felt she was not a candidate for intervention. Discussed status with RN who states there is no change in her clinical status overnight and most recent NIH is 5. Instructed to notify neurosurgery and will continue to monitor.     Evelyn Martinez, APRN, AGACNP-BC, FNP-BC   Pulmonary and Critical Care

## 2020-03-13 NOTE — PLAN OF CARE
Problem: Patient Care Overview  Goal: Plan of Care Review  Outcome: Ongoing (interventions implemented as appropriate)  Flowsheets (Taken 3/13/2020 1042)  Plan of Care Reviewed With: patient  Outcome Summary: PT initial evaluation completed for pt s/p R MCA CVA presenting with L sided weakness, impaired balance, and decreased functional mobility. Pt ambulated 200ft with Penelope 1+1. Pt's decreased independence warrants PT skilled care. Recommend D/C to IP rehab facility.

## 2020-03-13 NOTE — PROGRESS NOTES
Subjective: Postoperative day 2 status post right MCA thrombectomy, and intravenous TPA administration.  Patient was found to have a post TPA intracranial hemorrhage yesterday.    Objective:    Vitals:    20 0930   BP: 130/72   Pulse: 90   Resp:    Temp:    SpO2: 94%     Pulse  Av.3  Min: 79  Max: 110  Systolic (24hrs), Av , Min:96 , Max:150     Diastolic (24hrs), Av, Min:62, Max:92    Temp (24hrs), Av.2 °F (36.8 °C), Min:97.7 °F (36.5 °C), Max:98.5 °F (36.9 °C)      She is sitting in her bedside chair.  She moves all 4 extremities to command.  She has mild left-sided hemiparesis.  She is undergoing a swallow evaluation at the moment.  Her NIH stroke scale is 5    Her repeat noncontrast head CT from this morning demonstrates stable intraparenchymal hemorrhage with a mild amount of perilesional edema.  There is a slightly worsened right to left midline shift in comparison to her scan from yesterday.  The mesencephalic cisterns are patent.    Lab Results   Component Value Date     2020       A/P:   Continue ICU 1 more day.  May decrease frequency of neurochecks to every 2 hours.  Okay to work with PT/OT.  Out of bed okay.  Aggressive blood pressure control systolic less than 140.  She is currently off Cardene.    She can start Eliquis in 7 days, .    No additional neurosurgical recommendations at this time.  I would not repeat a head CT unless clinically indicated at this point.    Please note that this was an ASYMPTOMATIC post-tPA hemorrhage.     While the patient did have a temporary worsening of her clinical status, she did not have a sustained decrease in her NIHSS (currently a 5)

## 2020-03-14 LAB
ALBUMIN SERPL-MCNC: 3.3 G/DL (ref 3.5–5.2)
ALBUMIN/GLOB SERPL: 1.5 G/DL
ALP SERPL-CCNC: 69 U/L (ref 39–117)
ALT SERPL W P-5'-P-CCNC: <5 U/L (ref 1–33)
ANION GAP SERPL CALCULATED.3IONS-SCNC: 11 MMOL/L (ref 5–15)
AST SERPL-CCNC: 12 U/L (ref 1–32)
BILIRUB SERPL-MCNC: 0.4 MG/DL (ref 0.2–1.2)
BUN BLD-MCNC: 14 MG/DL (ref 8–23)
BUN/CREAT SERPL: 30.4 (ref 7–25)
CALCIUM SPEC-SCNC: 8.8 MG/DL (ref 8.6–10.5)
CHLORIDE SERPL-SCNC: 100 MMOL/L (ref 98–107)
CO2 SERPL-SCNC: 24 MMOL/L (ref 22–29)
CREAT BLD-MCNC: 0.46 MG/DL (ref 0.57–1)
DEPRECATED RDW RBC AUTO: 46.1 FL (ref 37–54)
ERYTHROCYTE [DISTWIDTH] IN BLOOD BY AUTOMATED COUNT: 13.4 % (ref 12.3–15.4)
GFR SERPL CREATININE-BSD FRML MDRD: 133 ML/MIN/1.73
GLOBULIN UR ELPH-MCNC: 2.2 GM/DL
GLUCOSE BLD-MCNC: 127 MG/DL (ref 65–99)
HCT VFR BLD AUTO: 36 % (ref 34–46.6)
HGB BLD-MCNC: 11.9 G/DL (ref 12–15.9)
MAGNESIUM SERPL-MCNC: 2 MG/DL (ref 1.6–2.4)
MCH RBC QN AUTO: 30.9 PG (ref 26.6–33)
MCHC RBC AUTO-ENTMCNC: 33.1 G/DL (ref 31.5–35.7)
MCV RBC AUTO: 93.5 FL (ref 79–97)
PLATELET # BLD AUTO: 230 10*3/MM3 (ref 140–450)
PMV BLD AUTO: 11.6 FL (ref 6–12)
POTASSIUM BLD-SCNC: 3.8 MMOL/L (ref 3.5–5.2)
PROT SERPL-MCNC: 5.5 G/DL (ref 6–8.5)
RBC # BLD AUTO: 3.85 10*6/MM3 (ref 3.77–5.28)
SODIUM BLD-SCNC: 135 MMOL/L (ref 136–145)
WBC NRBC COR # BLD: 10.96 10*3/MM3 (ref 3.4–10.8)

## 2020-03-14 PROCEDURE — 80053 COMPREHEN METABOLIC PANEL: CPT | Performed by: INTERNAL MEDICINE

## 2020-03-14 PROCEDURE — 94799 UNLISTED PULMONARY SVC/PX: CPT

## 2020-03-14 PROCEDURE — 97110 THERAPEUTIC EXERCISES: CPT

## 2020-03-14 PROCEDURE — 99232 SBSQ HOSP IP/OBS MODERATE 35: CPT | Performed by: INTERNAL MEDICINE

## 2020-03-14 PROCEDURE — 83735 ASSAY OF MAGNESIUM: CPT | Performed by: INTERNAL MEDICINE

## 2020-03-14 PROCEDURE — 85027 COMPLETE CBC AUTOMATED: CPT | Performed by: INTERNAL MEDICINE

## 2020-03-14 PROCEDURE — 97116 GAIT TRAINING THERAPY: CPT

## 2020-03-14 RX ORDER — IPRATROPIUM BROMIDE AND ALBUTEROL SULFATE 2.5; .5 MG/3ML; MG/3ML
3 SOLUTION RESPIRATORY (INHALATION) EVERY 6 HOURS PRN
Status: DISCONTINUED | OUTPATIENT
Start: 2020-03-14 | End: 2020-03-16 | Stop reason: HOSPADM

## 2020-03-14 RX ADMIN — NICOTINE 1 PATCH: 21 PATCH, EXTENDED RELEASE TRANSDERMAL at 08:42

## 2020-03-14 RX ADMIN — IPRATROPIUM BROMIDE AND ALBUTEROL SULFATE 3 ML: 2.5; .5 SOLUTION RESPIRATORY (INHALATION) at 09:19

## 2020-03-14 RX ADMIN — ATORVASTATIN CALCIUM 80 MG: 40 TABLET, FILM COATED ORAL at 21:11

## 2020-03-14 RX ADMIN — METOPROLOL TARTRATE 5 MG: 5 INJECTION INTRAVENOUS at 06:07

## 2020-03-14 RX ADMIN — ACETAMINOPHEN 650 MG: 325 TABLET, FILM COATED ORAL at 06:07

## 2020-03-14 RX ADMIN — METOPROLOL TARTRATE 5 MG: 5 INJECTION INTRAVENOUS at 00:02

## 2020-03-14 RX ADMIN — ACETAMINOPHEN 650 MG: 325 TABLET, FILM COATED ORAL at 16:12

## 2020-03-14 RX ADMIN — METOPROLOL TARTRATE 25 MG: 25 TABLET ORAL at 12:12

## 2020-03-14 RX ADMIN — METOPROLOL TARTRATE 25 MG: 25 TABLET ORAL at 21:11

## 2020-03-14 NOTE — PLAN OF CARE
Problem: Patient Care Overview  Goal: Plan of Care Review  Outcome: Ongoing (interventions implemented as appropriate)  Flowsheets  Taken 3/14/2020 1616  Progress: no change  Taken 3/14/2020 1613  Plan of Care Reviewed With: patient;daughter  Outcome Summary: Pt required more A with gait training today with L lean and freq LOB noted, mod A with rw to ambulate x 250ft. Min A with transfers. VSS. Cont with IPPT, recommend IP rehab upon dc.

## 2020-03-14 NOTE — PLAN OF CARE
Problem: Patient Care Overview  Goal: Plan of Care Review  Outcome: Ongoing (interventions implemented as appropriate)   NIH 5 throughout with left arm weakness, left pull less than right pull. Oriented x 3. Left visual field cut. OOB to chair x 2. Up to bedside commode. Tylenol PO q 4 hrs and effective for headache at right temple. HR 80-90s with PACS and PVCs. Pt on metoprolol. Family has asked when home med Sotalol can be restarted..

## 2020-03-14 NOTE — PLAN OF CARE
Problem: Patient Care Overview  Goal: Plan of Care Review  Outcome: Ongoing (interventions implemented as appropriate)  Flowsheets (Taken 3/14/2020 2481)  Progress: improving  Plan of Care Reviewed With: patient; daughter  Outcome Summary: NIH 6 this am. Ambulated in kay and ambulating to bathroom well. Patient is medically ready for rehab. VSS, will continue to monitor.

## 2020-03-14 NOTE — PROGRESS NOTES
Critical Care Note     LOS: 3 days   Patient Care Team:  Sameera Davenport APRN as PCP - General (Family Medicine)    Chief Complaint/Reason for visit:      Right middle cerebral artery stroke  Hemorrhagic conversion  History of nonsustained V. Tach  Probable COPD      Subjective     74-year-old woman presenting on March 11 with left-sided weakness and dysarthria.  She received TPA.  Perfusion scan showed occlusion of her right middle cerebral artery branch and she underwent thrombectomy.  Initial NIH stroke scale here was a 9.  Postprocedure her motor weakness resolved.  She had some persistent facial weakness.  She was more lethargic March 12 morning and underwent a CT scan that revealed hemorrhagic conversion.  She had an abnormal Holter monitor with frequent PVCs and a 9 beat run of ventricular tachycardia.  She was scheduled for an outpatient heart catheterization.  She does not have angina.    Interval History:     Denies headache.  NIH stroke scale 6.   3 L nasal cannula, saturation 97 200%  Remains in sinus rhythm  FEES yesterday revealed no penetration or aspiration with any consistencies.  She had some oral dysphasia with prolonged chewing and oral residue.  Tolerating a p.o. Diet  Ambulated 200 feet with assist of 1, impaired balance.    Review of Systems:    All systems were reviewed and negative except as noted in subjective.    Medical history, surgical history, social history, family history reviewed    Objective     Intake/Output:    Intake/Output Summary (Last 24 hours) at 3/14/2020 1132  Last data filed at 3/14/2020 0900  Gross per 24 hour   Intake 1380 ml   Output 725 ml   Net 655 ml       Nutrition:  Diet Soft Texture; Ground; Nectar / Syrup Thick; Cardiac    Infusions:    niCARdipine 5-15 mg/hr Last Rate: Stopped (03/12/20 2300)       Telemetry: Sinus rhythm             Vital Signs  Blood pressure 131/71, pulse 87, temperature 97.8 °F (36.6 °C), temperature source Oral, resp. rate 20, height  "160 cm (63\"), weight 53.6 kg (118 lb 2.7 oz), SpO2 97 %.    Physical Exam:  General Appearance:   Older white woman, alert   Head:   Atraumatic   Eyes:          Pupils reactive to light   Ears:     Throat:  Oral mucosa moist   Neck:  Trachea midline, no palpable thyroid   Back:      Lungs:    Symmetric chest expansion.  Breath sounds are bilateral without wheeze or rhonchi    Heart:   Regular rhythm, S1, S2 auscultated, no murmur   Abdomen:    Bowel sounds present, soft, nontender   Rectal:   Deferred   Extremities:  No pitting edema or clubbing   Pulses:    Skin:  Warm and dry   Lymph nodes:    Neurologic:  Alert, oriented.  Mild left facial droop.  Left upper extremity is clumsy, mildly weak with a drift.  Persistent mild dysarthria..      Results Review:     I reviewed the patient's new clinical results.   Results from last 7 days   Lab Units 03/14/20  0514 03/12/20  1442 03/12/20  0329   SODIUM mmol/L 135*  --  138   POTASSIUM mmol/L 3.8 4.2 3.1*   CHLORIDE mmol/L 100  --  104   CO2 mmol/L 24.0  --  20.0*   BUN mg/dL 14  --  8   CREATININE mg/dL 0.46*  --  0.46*   CALCIUM mg/dL 8.8  --  8.8   BILIRUBIN mg/dL 0.4  --   --    ALK PHOS U/L 69  --   --    ALT (SGPT) U/L <5  --   --    AST (SGOT) U/L 12  --   --    GLUCOSE mg/dL 127*  --  109*     Results from last 7 days   Lab Units 03/14/20  0514 03/12/20  0328   WBC 10*3/mm3 10.96* 11.87*   HEMOGLOBIN g/dL 11.9* 13.9   HEMATOCRIT % 36.0 44.9   PLATELETS 10*3/mm3 230 227         No results found for: BLOODCX  No results found for: URINECX    I reviewed the patient's new imaging including images and reports.    FINDINGS:   The patient has a known right MCA territory infarct with evolving hemorrhagic transformation. The largest components of hemorrhage within the evolving infarct and surrounding area of vasogenic edema measure up to about 5.0 x 3.6 x 4.1 cm. There is  accompanying subarachnoid hemorrhage approaching the vertex, stable to less conspicuous than on the " prior study. There is generalized edema throughout the right cerebral hemisphere. Compared to the prior examination, interval subtle worsening of mass  effect and midline shift from right to left of about 3 mm. Subtle but increasing effacement of the right lateral ventricle. The fourth ventricle and third ventricle remain patent.     No distinct evidence of intraventricular extension of hemorrhage. The globes are intact. Bones are intact. Sinuses are clear.     Estimated volume of intracranial hemorrhage: 36 mL     IMPRESSION:     1. Right MCA territory infarct with hemorrhagic transformation. Interval subtle worsening of mass effect with midline shift from right to left measuring about 3 mm. Interval subtle worsening of mass effect and effacement of the right lateral ventricle.  Persistent edema throughout the right cerebral hemisphere with stable to interval decrease in overall volume of subarachnoid blood.  2. No new evidence of intraventricular extension of hemorrhage..        NOTIFICATION: Critical Value/emergent results were called by telephone at the time of interpretation on 3/13/2020 5:48 AM to RICKIE Aranda who verbally acknowledged these results.     Signer Name: Aleksandr Marquez MD   Signed: 3/13/2020 5:53 AM    All medications reviewed.     atorvastatin 80 mg Oral Nightly   ipratropium-albuterol 3 mL Nebulization 4x Daily - RT   metoprolol tartrate 5 mg Intravenous Q6H   nicotine 1 patch Transdermal Q24H         Assessment/Plan       R MCA AIS s/p tPA and thrombectomy     Tissue plasminogen activator (tPA) administered at other facility within 24 hours prior to current admission    Intracranial bleed (CMS/HCC); hemorrhagic conversion of an ischemic stroke    Smoker    Dyslipidemia on statin     Essential hypertension    #1 ischemic stroke, right middle cerebral artery status post TPA and thrombectomy.  March 12 she was more lethargic and CT scan confirmed hemorrhagic conversion.   NIHSS 7.  Fees  evaluation yesterday and passed.  Ambulated 200 feet with physical therapy.  Headache resolved.    #2 hypertension changed to IV Lopressor because of her decreased alertness.  Now that she is more alert and has passed her swallow evaluation will transition to oral medications.  Goal is to keep her blood pressure less than 140    #3 history of tobacco use, currently without wheezing.  Oxygenation is adequate on 2 L nasal cannula    #4 evaluated by cardiology with plans for an outpatient heart catheterization secondary to multiple PVCs and one run of nonsustained V. tach.  Typically we do not do elective heart catheterizations in the hospital.  She is not having unstable angina or active arrhythmias currently.    PLAN:  Transition to oral blood pressure medication; metoprolol  Continue speech therapy, Occupational Therapy, physical therapy  Continue high-dose statin  Aspirin discontinued because of hemorrhagic conversion  Neurosurgery would like Eliquis started on Friday  Repeat CT scan Monday    VTE Prophylaxis:SCDS    Stress Ulcer Prophylaxis: None    Lenora Coto MD  03/14/20  11:32      Time: 25min  I personally provided care to this critically ill patient as documented above.  Critical care time does not include time spent on separately billed procedures.  Non of my critical care time was concurrent with other critical care providers.

## 2020-03-14 NOTE — THERAPY EVALUATION
Patient Name: Maria Luz Wade  : 1945    MRN: [id number]                              Today's Date: 3/14/2020       Admit Date: 3/11/2020    Visit Dx:     ICD-10-CM ICD-9-CM   1. Dysphagia, unspecified type R13.10 787.20   2. Cognitive communication deficit R41.841 799.52     Patient Active Problem List   Diagnosis   • Dizziness   • PVC (premature ventricular contraction)   • Smoker   • Palpitations   • Dyslipidemia on statin    • Essential hypertension   • R MCA AIS s/p tPA and thrombectomy    • Tissue plasminogen activator (tPA) administered at other facility within 24 hours prior to current admission   • Intracranial bleed (CMS/HCC); hemorrhagic conversion of an ischemic stroke     Past Medical History:   Diagnosis Date   • History of tonsillectomy    • History of tubal ligation    • Hyperlipidemia      Past Surgical History:   Procedure Laterality Date   • CARDIOVASCULAR STRESS TEST  2017    L.Myoview- R/O Anterior Ischemia.   • CONVERTED (HISTORICAL) HOLTER  2020    AVG 77. . 6.8% PVC. one 6 beats run of V-Tach   • ECHO - CONVERTED  2017    EF 65%. Mild-Mod MR   • INTERVENTIONAL RADIOLOGY PROCEDURE Bilateral 3/11/2020    Procedure: CAROTID CEREBRAL ANGIOGRAM BILATERAL;  Surgeon: Adryan Carter MD;  Location: [facility] INVASIVE LOCATION;  Service: Interventional Radiology;  Laterality: Bilateral;   • OTHER SURGICAL HISTORY      CT Scan- 60% (L) ICA. 50% (R) ICA   • US CAROTID UNILATERAL  2017    @ [facility]. No sig stenosis     General Information     Row Name 20 1605          PT Evaluation Time/Intention    Document Type  therapy note (daily note)  -SR     Mode of Treatment  physical therapy  -SR     Row Name 20 1603          General Information    Patient Profile Reviewed?  yes  -SR     Existing Precautions/Restrictions  fall L-sided weakness, decreased sensation, L visual cut  -SR     Barriers to Rehab  visual deficit;cognitive status  decreased processing, flat affect  -SR     Row Name 03/14/20 1605          Cognitive Assessment/Intervention- PT/OT    Orientation Status (Cognition)  oriented x 3  -SR     Row Name 03/14/20 1605          Safety Issues, Functional Mobility    Safety Issues Affecting Function (Mobility)  ability to follow commands;insight into deficits/self awareness;judgment;awareness of need for assistance;positioning of assistive device;problem solving;sequencing abilities  -SR     Impairments Affecting Function (Mobility)  balance;cognition;coordination;endurance/activity tolerance;grasp;motor control;motor planning;strength;sensation/sensory awareness;postural/trunk control  -SR       User Key  (r) = Recorded By, (t) = Taken By, (c) = Cosigned By    Initials Name Provider Type    SR Evelyn Leggett, PT Physical Therapist        Mobility     Row Name 03/14/20 1608          Bed Mobility Assessment/Treatment    Comment (Bed Mobility)  UIC  -SR     Row Name 03/14/20 1608          Transfer Assessment/Treatment    Comment (Transfers)  cues for HP and safety awareness  -SR     Row Name 03/14/20 1608          Sit-Stand Transfer    Sit-Stand [place] (Transfers)  minimum assist (75% patient effort);verbal cues  -SR     Assistive Device (Sit-Stand Transfers)  walker, front-wheeled  -SR     Row Name 03/14/20 1608          Gait/Stairs Assessment/Training    70363 - Gait Training Minutes   12  -SR     Gait/Stairs Assessment/Training  gait/ambulation assistive device  -SR     [place] Level (Gait)  moderate assist (50% patient effort)  -SR     Assistive Device (Gait)  walker, front-wheeled  -SR     Distance in Feet (Gait)  250  -SR     Pattern (Gait)  step-through  -SR     Deviations/Abnormal Patterns (Gait)  base of support, narrow;gait speed decreased;shad decreased;stride length decreased  -SR     Bilateral Gait Deviations  forward flexed posture;heel strike decreased  -SR     Left Sided Gait Deviations  weight shift ability  decreased;leans left  -SR     Comment (Gait/Stairs)  needs A to hold L  on rw, mod A d/t significant L lean, cues for safety awareness  -SR       User Key  (r) = Recorded By, (t) = Taken By, (c) = Cosigned By    Initials Name Provider Type    SR Evelyn Leggett, PT Physical Therapist        Obj/Interventions     Row Name 03/14/20 1611          Therapeutic Exercise    Lower Extremity (Therapeutic Exercise)  heel slides, left;SLR (straight leg raise), left  -SR     Lower Extremity Range of Motion (Therapeutic Exercise)  ankle dorsiflexion/plantar flexion, left  -SR     Comment (Therapeutic Exercise)  PT education pt and family on benefits of ther ex.   -SR     Row Name 03/14/20 1611          Static Sitting Balance    Level of [place] (Unsupported Sitting, Static Balance)  supervision  -SR     Sitting Position (Unsupported Sitting, Static Balance)  sitting in chair  -SR     Time Able to Maintain Position (Unsupported Sitting, Static Balance)  more than 5 minutes  -SR     Row Name 03/14/20 1611          Static Standing Balance    Level of [place] (Supported Standing, Static Balance)  moderate assist, 50 to 74% patient effort  -SR     Time Able to Maintain Position (Supported Standing, Static Balance)  1 to 2 minutes  -SR     Assistive Device Utilized (Supported Standing, Static Balance)  walker, rolling  -SR     Comment (Supported Standing, Static Balance)  L lean  -SR       User Key  (r) = Recorded By, (t) = Taken By, (c) = Cosigned By    Initials Name Provider Type    SR Evelyn Leggett, PT Physical Therapist        Goals/Plan    No documentation.       Clinical Impression     Row Name 03/14/20 1613          Pain Scale: Numbers Pre/Post-Treatment    Pain Scale: Numbers, Pretreatment  0/10 - no pain  -SR     Pain Scale: Numbers, Post-Treatment  0/10 - no pain  -SR     Row Name 03/14/20 1613          Plan of Care Review    Plan of Care Reviewed With  patient;daughter  -SR     Progress  improving  -SR      Outcome Summary  Pt required more A with gait training today with L lean and freq LOB noted, mod A with rw to ambulate x 250ft. Min A with transfers. VSS. Cont with IPPT, recommend IP rehab upon dc.   -SR     Row Name 03/14/20 1613          Positioning and Restraints    Pre-Treatment Position  sitting in chair/recliner  -SR     Post Treatment Position  chair  -SR     In Chair  notified nsg;reclined;sitting;call light within reach;encouraged to call for assist;with family/caregiver  -SR       User Key  (r) = Recorded By, (t) = Taken By, (c) = Cosigned By    Initials Name Provider Type    SR Evelyn Leggett, PT Physical Therapist        Outcome Measures     Row Name 03/14/20 1616          How much help from another person do you currently need...    Turning from your back to your side while in flat bed without using bedrails?  3  -SR     Moving from lying on back to sitting on the side of a flat bed without bedrails?  2  -SR     Moving to and from a bed to a chair (including a wheelchair)?  3  -SR     Climbing 3-5 steps with a railing?  2  -SR     To walk in hospital room?  2  -SR     Row Name 03/14/20 1616          Functional Assessment    Outcome Measure Options  AM-PAC 6 Clicks Basic Mobility (PT)  -SR       User Key  (r) = Recorded By, (t) = Taken By, (c) = Cosigned By    Initials Name Provider Type    Evelyn Ward, PT Physical Therapist          PT Recommendation and Plan     Outcome Summary/Treatment Plan (PT)  Anticipated Discharge Disposition (PT): inpatient rehabilitation facility  Plan of Care Reviewed With: patient, daughter  Progress: no change  Outcome Summary: Pt required more A with gait training today with L lean and freq LOB noted, mod A with rw to ambulate x 250ft. Min A with transfers. VSS. Cont with IPPT, recommend IP rehab upon dc.      Time Calculation:   PT Charges     Row Name 03/14/20 1617 03/14/20 1608          Time Calculation    Start Time  1410  -SR  --     PT Received On  03/14/20   -SR  --        Time Calculation- PT    Total Timed Code Minutes- PT  28 minute(s)  -SR  --        Timed Charges    47036 - Gait Training Minutes   --  12  -SR       User Key  (r) = Recorded By, (t) = Taken By, (c) = Cosigned By    Initials Name Provider Type    SR Evelyn Leggett, PT Physical Therapist        Therapy Charges for Today     Code Description Service Date Service Provider Modifiers Qty    32182818182 HC PT THER PROC EA 15 MIN 3/14/2020 Evelyn Leggett, PT GP 1    39191362530 HC GAIT TRAINING EA 15 MIN 3/14/2020 Evelyn Leggett, PT GP 1          PT G-Codes  Outcome Measure Options: AM-PAC 6 Clicks Basic Mobility (PT)  AM-PAC 6 Clicks Score (PT): 16  AM-PAC 6 Clicks Score (OT): 13  Modified [place] Scale: 3 - Moderate disability.  Requiring some help, but able to walk without assistance.    Evelyn Leggett, PT  3/14/2020

## 2020-03-14 NOTE — PLAN OF CARE
Blood pressure remains under 140 without gtts.  Neuro remains stable.  Headache being managed with tylenol.

## 2020-03-15 PROCEDURE — 99232 SBSQ HOSP IP/OBS MODERATE 35: CPT | Performed by: INTERNAL MEDICINE

## 2020-03-15 RX ORDER — BUDESONIDE 3 MG/1
9 CAPSULE, COATED PELLETS ORAL DAILY
Status: DISCONTINUED | OUTPATIENT
Start: 2020-03-15 | End: 2020-03-15

## 2020-03-15 RX ORDER — BUDESONIDE 3 MG/1
9 CAPSULE, COATED PELLETS ORAL DAILY
Status: DISCONTINUED | OUTPATIENT
Start: 2020-03-15 | End: 2020-03-16 | Stop reason: HOSPADM

## 2020-03-15 RX ADMIN — METOPROLOL TARTRATE 25 MG: 25 TABLET ORAL at 21:12

## 2020-03-15 RX ADMIN — NICOTINE 1 PATCH: 21 PATCH, EXTENDED RELEASE TRANSDERMAL at 09:08

## 2020-03-15 RX ADMIN — METOPROLOL TARTRATE 25 MG: 25 TABLET ORAL at 09:08

## 2020-03-15 RX ADMIN — ACETAMINOPHEN 650 MG: 325 TABLET, FILM COATED ORAL at 00:10

## 2020-03-15 RX ADMIN — BUDESONIDE 9 MG: 3 CAPSULE ORAL at 13:14

## 2020-03-15 RX ADMIN — ATORVASTATIN CALCIUM 80 MG: 40 TABLET, FILM COATED ORAL at 21:12

## 2020-03-15 RX ADMIN — ACETAMINOPHEN 650 MG: 325 TABLET, FILM COATED ORAL at 13:15

## 2020-03-15 NOTE — PLAN OF CARE
Neuro status stable. Vital signs stable. Ambulating with assist of 1. Daughter states she has an inpatient rehab bed ready for her. Eagerly awaiting transfer.

## 2020-03-15 NOTE — PROGRESS NOTES
"Critical Care Note     LOS: 4 days   Patient Care Team:  Sameera Davenport APRN as PCP - General (Family Medicine)    Chief Complaint/Reason for visit:      Right middle cerebral artery stroke  Hemorrhagic conversion  History of nonsustained V. Tach  Probable COPD      Subjective     74-year-old woman presenting on March 11 with left-sided weakness and dysarthria.  She received TPA.  Perfusion scan showed occlusion of her right middle cerebral artery branch and she underwent thrombectomy.  Initial NIH stroke scale here was a 9.  Postprocedure her motor weakness resolved.  She had some persistent facial weakness.  She was more lethargic March 12 morning and underwent a CT scan that revealed hemorrhagic conversion.  She had an abnormal Holter monitor with frequent PVCs and a 9 beat run of ventricular tachycardia.  She was scheduled for an outpatient heart catheterization.  She does not have angina.    Interval History:     Diarrhea through the night.  Has a history of lymphoid colitis and takes Entocort which has not been given.  NIH stroke scale remains a 6      Review of Systems:    All systems were reviewed and negative except as noted in subjective.    Medical history, surgical history, social history, family history reviewed    Objective     Intake/Output:    Intake/Output Summary (Last 24 hours) at 3/15/2020 1215  Last data filed at 3/15/2020 0900  Gross per 24 hour   Intake 590 ml   Output --   Net 590 ml       Nutrition:  Diet Soft Texture; Ground; Nectar / Syrup Thick; Cardiac    Infusions:       Telemetry: Sinus rhythm             Vital Signs  Blood pressure 113/73, pulse 85, temperature 98 °F (36.7 °C), temperature source Oral, resp. rate 16, height 160 cm (63\"), weight 53.6 kg (118 lb 2.7 oz), SpO2 96 %.    Physical Exam:  General Appearance:   Older white woman, alert   Head:   Atraumatic   Eyes:          Pupils reactive to light   Ears:     Throat:  Oral mucosa moist   Neck:  Trachea midline, no " palpable thyroid   Back:      Lungs:    Symmetric chest expansion.  Breath sounds are bilateral without wheeze or rhonchi    Heart:   Regular rhythm, S1, S2 auscultated, no murmur   Abdomen:    Bowel sounds present, soft, nontender   Rectal:   Deferred   Extremities:  No pitting edema or clubbing   Pulses:    Skin:  Warm and dry   Lymph nodes:    Neurologic:  Alert, oriented.  Mild left facial droop.  Left upper extremity is clumsy, mildly weak with a drift.  Persistent mild dysarthria..      Results Review:     I reviewed the patient's new clinical results.   Results from last 7 days   Lab Units 03/14/20  0514 03/12/20  1442 03/12/20  0329   SODIUM mmol/L 135*  --  138   POTASSIUM mmol/L 3.8 4.2 3.1*   CHLORIDE mmol/L 100  --  104   CO2 mmol/L 24.0  --  20.0*   BUN mg/dL 14  --  8   CREATININE mg/dL 0.46*  --  0.46*   CALCIUM mg/dL 8.8  --  8.8   BILIRUBIN mg/dL 0.4  --   --    ALK PHOS U/L 69  --   --    ALT (SGPT) U/L <5  --   --    AST (SGOT) U/L 12  --   --    GLUCOSE mg/dL 127*  --  109*     Results from last 7 days   Lab Units 03/14/20  0514 03/12/20  0328   WBC 10*3/mm3 10.96* 11.87*   HEMOGLOBIN g/dL 11.9* 13.9   HEMATOCRIT % 36.0 44.9   PLATELETS 10*3/mm3 230 227         No results found for: BLOODCX  No results found for: URINECX    I reviewed the patient's new imaging including images and reports.    FINDINGS:   The patient has a known right MCA territory infarct with evolving hemorrhagic transformation. The largest components of hemorrhage within the evolving infarct and surrounding area of vasogenic edema measure up to about 5.0 x 3.6 x 4.1 cm. There is  accompanying subarachnoid hemorrhage approaching the vertex, stable to less conspicuous than on the prior study. There is generalized edema throughout the right cerebral hemisphere. Compared to the prior examination, interval subtle worsening of mass  effect and midline shift from right to left of about 3 mm. Subtle but increasing effacement of the  right lateral ventricle. The fourth ventricle and third ventricle remain patent.     No distinct evidence of intraventricular extension of hemorrhage. The globes are intact. Bones are intact. Sinuses are clear.     Estimated volume of intracranial hemorrhage: 36 mL     IMPRESSION:     1. Right MCA territory infarct with hemorrhagic transformation. Interval subtle worsening of mass effect with midline shift from right to left measuring about 3 mm. Interval subtle worsening of mass effect and effacement of the right lateral ventricle.  Persistent edema throughout the right cerebral hemisphere with stable to interval decrease in overall volume of subarachnoid blood.  2. No new evidence of intraventricular extension of hemorrhage..        NOTIFICATION: Critical Value/emergent results were called by telephone at the time of interpretation on 3/13/2020 5:48 AM to RICKIE Aranda who verbally acknowledged these results.     Signer Name: Aleksandr Marquez MD   Signed: 3/13/2020 5:53 AM    All medications reviewed.     atorvastatin 80 mg Oral Nightly   Budesonide 9 mg Oral Daily   metoprolol tartrate 25 mg Oral Q12H   nicotine 1 patch Transdermal Q24H         Assessment/Plan       R MCA AIS s/p tPA and thrombectomy     Tissue plasminogen activator (tPA) administered at other facility within 24 hours prior to current admission    Intracranial bleed (CMS/HCC); hemorrhagic conversion of an ischemic stroke    Smoker    Dyslipidemia on statin     Essential hypertension    #1 ischemic stroke, right middle cerebral artery status post TPA and thrombectomy.  March 12 she was more lethargic and CT scan confirmed hemorrhagic conversion.   NIHSS 7.  Fees evaluation yesterday and passed.  Ambulated 200 feet with physical therapy.  Headache resolved.     #2 hypertension blood pressure 113- 130 on metoprolol    #3 history of tobacco use, currently without wheezing.  Room air saturation 96%    #4 evaluated by cardiology with plans for an  outpatient heart catheterization secondary to multiple PVCs and one run of nonsustained V. tach.  Typically we do not do elective heart catheterizations in the hospital.  She is not having unstable angina or active arrhythmias currently.    #5 diarrhea, known history of lymphoid colitis for which she takes Entocort.  No reason to suspect infectious etiology    PLAN:  Daughter reports that there is a bed at [facility] for inpatient rehab.  However our  called and could not confirm that there was a bed available and was told that they were not taking admissions today.    PT, OT, speech    Metoprolol    Restart Entocort    Telemetry    VTE Prophylaxis:SCDS    Stress Ulcer Prophylaxis: None    Lenora Coto MD  03/15/20  12:15      Time: 25min  I personally provided care to this critically ill patient as documented above.  Critical care time does not include time spent on separately billed procedures.  Non of my critical care time was concurrent with other critical care providers.

## 2020-03-15 NOTE — DISCHARGE PLACEMENT REQUEST
"Maria Luz Patel (74 y.o. Female)     From [place],   [phone]      Date of Birth Social Security Number Address Home Phone MRN    1945  [address] [place] [address] [address] [place] [address] [phone] [id number]    Ep[employer] Marital Status          Unknown        Admission Date Admission Type Admitting Provider Attending Provider Department, Room/Bed    3/11/20 Urgent Case, DO Chica Oliveira Donna C, MD [facility] 2B ICU, N223/1    Discharge Date Discharge Disposition Discharge Destination                       Attending Provider:  Lenora Coto MD    Allergies:  No Known Allergies    Isolation:  None   Infection:  None   Code Status:  CPR    Ht:  160 cm (63\")   Wt:  53.6 kg (118 lb 2.7 oz)    Admission Cmt:  None   Principal Problem:  R MCA AIS s/p tPA and thrombectomy  [I63.9] More...                 Active Insurance as of 3/11/2020     Primary Coverage     Payor Plan Insurance Group Employer/Plan Group    MEDICARE MEDICARE A & B      Payor Plan Address Payor Plan Phone Number Payor Plan Fax Number Effective Dates    [address] [phone]  11/1/2010 - None Entered    [place] [id number]       Subscriber Name Subscriber Birth Date Member ID       MARIA LUZ PATEL 1945 [id number]                 Emergency Contacts      (Rel.) Home Phone Work Phone Mobile Phone    Cole Patel (Spouse) [phone] -- --    ANISHA RICO [phone] -- --               History & Physical      Lenora Coto MD at 03/11/20 0944              ICU ADMISSION NOTE    Chief complaint L-sided weakness   Right-sided CVA  Hypertension  Dyslipidemia  Tobacco abuse  Palpitations    Subjective     Maria Luz Patel is a 74 y.o. female smoker admitted to [facility] on 3/11 from [facility] for management of stroke.     The patient presented to [facility] ED with complaints of slurred speech, left-sided hemiparesis, and left facial droop with NIH of 5 and GCS 15 " on arrival. Symptoms began ~0610 while taking a shower. CT head negative for hemorrhage and she was deemed appropriate for thrombolytic therapy that was initiated at 0852. She is being transferred to [facility] for higher level of care.     On [facility] ICU arrival NIH 9 and perfusion imaging shows an anterior R MCA infarct with moderate amount of ischemic penumbra. She was taken to the cath lab per Dr. Monroy for successful Trevo mechanical thrombectomy of the right MCA thrombus suspected cardioembolic in origin. Post-cath she was transferred to the neurosurgical ICU in stable condition on Nicardipine infusion.     She was recently evaluated by her cardiologist and underwent Holter monitoring found to have 7% PVCs and a solitary 6 beat run of VT placed on sotalol. There is no mention of atrial arrhythmias. There is also a history of carotid stenosis diagnosed in 2015 evaluated by CTS who recommended serial imaging. Most recent duplex from 2017 revealed mild bilateral plaque formation without hemodynamically significant disease. At that time echocardiogram revealed EF >65% with evidence of mild diastolic dysfunction as well as mild-moderate MR and pathologic TR. she was scheduled to have a heart catheterization but it has not been completed.  She has never suffered a heart attack.  She is a current smoker.  She denies a chronic cough, wheezing.  She does have a rescue inhaler.  She does not wear home oxygen.  She also takes medication for dyslipidemia and hypertension.  Palpitations improved significantly when she was started on sotalol.      Review of Systems  Review of Systems   Respiratory: Negative for cough and wheezing.    Cardiovascular: Negative for chest pain and palpitations.   Musculoskeletal: Positive for gait problem.   Neurological: Positive for facial asymmetry (Left facial droop ), speech difficulty and weakness (Left sided ).   All other systems reviewed and are negative.       Home Medications  Medications Prior  to Admission   Medication Sig Dispense Refill Last Dose   • albuterol sulfate  (90 Base) MCG/ACT inhaler Inhale 2 puffs Every 4 (Four) Hours As Needed for Wheezing.   Taking   • amLODIPine (NORVASC) 5 MG tablet Take 5 mg by mouth 2 (Two) Times a Day.   Taking   • aspirin 81 MG EC tablet Take 81 mg by mouth 2 (Two) Times a Day.   Taking   • atorvastatin (LIPITOR) 40 MG tablet One and 1/2 tablets at night   Taking   • Budesonide (ENTOCORT EC) 3 MG 24 hr capsule Take 3 mg by mouth Every Morning.   Taking   • busPIRone (BUSPAR) 10 MG tablet Take 10 mg by mouth 3 (Three) Times a Day.   Taking   • celecoxib (CeleBREX) 100 MG capsule Take 100 mg by mouth 2 (Two) Times a Day.   Taking   • Cholecalciferol (VITAMIN D) 125 MCG (5000 UT) capsule capsule Take 5,000 Units by mouth Daily.   Taking   • DULoxetine (CYMBALTA) 60 MG capsule Take 60 mg by mouth 2 (Two) Times a Day.   Taking   • glucosamine-chondroitin 500-400 MG capsule capsule Take  by mouth 2 (Two) Times a Day With Meals.   Taking   • loratadine (CLARITIN) 10 MG tablet Take 10 mg by mouth Daily.   Taking   • pantoprazole (PROTONIX) 40 MG EC tablet Take 40 mg by mouth Daily.   Taking   • sertraline (ZOLOFT) 25 MG tablet Take 25 mg by mouth Daily.   Taking   • Sotalol HCl AF 80 MG tablet Take 1 tablet by mouth 2 (Two) Times a Day. 180 tablet 3 Taking   • vitamin B-12 (CYANOCOBALAMIN) 1000 MCG tablet Take 2,000 mcg by mouth Daily.   Taking       History  Past Medical History:   Diagnosis Date   • History of tonsillectomy 1951   • History of tubal ligation    • Hyperlipidemia      Past Surgical History:   Procedure Laterality Date   • CARDIOVASCULAR STRESS TEST  11/30/2017    L.Myoview- R/O Anterior Ischemia.   • CONVERTED (HISTORICAL) HOLTER  02/12/2020    AVG 77. . 6.8% PVC. one 6 beats run of V-Tach   • ECHO - CONVERTED  11/30/2017    EF 65%. Mild-Mod MR   • OTHER SURGICAL HISTORY  2015    CT Scan- 60% (L) ICA. 50% (R) ICA   • US CAROTID UNILATERAL   11/16/2017    @ [facility]. No sig stenosis     Family History   Problem Relation Age of Onset   • Hyperlipidemia Mother    • Hypertension Mother    • Stroke Mother    • Heart attack Father    • Aneurysm Father    • Alzheimer's disease Maternal Aunt    • Stroke Maternal Grandmother    • Stroke Maternal Grandfather      Social History     Tobacco Use   • Smoking status: Current Every Day Smoker     Packs/day: 1.00     Years: 60.00     Pack years: 60.00   • Smokeless tobacco: Never Used   • Tobacco comment: patient counseled on effects of tobacco use on health   Substance Use Topics   • Alcohol use: No   • Drug use: No     Medications Prior to Admission   Medication Sig Dispense Refill Last Dose   • albuterol sulfate  (90 Base) MCG/ACT inhaler Inhale 2 puffs Every 4 (Four) Hours As Needed for Wheezing.   Taking   • amLODIPine (NORVASC) 5 MG tablet Take 5 mg by mouth 2 (Two) Times a Day.   Taking   • aspirin 81 MG EC tablet Take 81 mg by mouth 2 (Two) Times a Day.   Taking   • atorvastatin (LIPITOR) 40 MG tablet One and 1/2 tablets at night   Taking   • Budesonide (ENTOCORT EC) 3 MG 24 hr capsule Take 3 mg by mouth Every Morning.   Taking   • busPIRone (BUSPAR) 10 MG tablet Take 10 mg by mouth 3 (Three) Times a Day.   Taking   • celecoxib (CeleBREX) 100 MG capsule Take 100 mg by mouth 2 (Two) Times a Day.   Taking   • Cholecalciferol (VITAMIN D) 125 MCG (5000 UT) capsule capsule Take 5,000 Units by mouth Daily.   Taking   • DULoxetine (CYMBALTA) 60 MG capsule Take 60 mg by mouth 2 (Two) Times a Day.   Taking   • glucosamine-chondroitin 500-400 MG capsule capsule Take  by mouth 2 (Two) Times a Day With Meals.   Taking   • loratadine (CLARITIN) 10 MG tablet Take 10 mg by mouth Daily.   Taking   • pantoprazole (PROTONIX) 40 MG EC tablet Take 40 mg by mouth Daily.   Taking   • sertraline (ZOLOFT) 25 MG tablet Take 25 mg by mouth Daily.   Taking   • Sotalol HCl AF 80 MG tablet Take 1 tablet by mouth 2 (Two) Times a Day.  180 tablet 3 Taking   • vitamin B-12 (CYANOCOBALAMIN) 1000 MCG tablet Take 2,000 mcg by mouth Daily.   Taking     Allergies:  Patient has no known allergies.      Objective     Vital Signs  Blood pressure 146/97, pulse 98, resp. rate 16, weight 52.8 kg (116 lb 6.5 oz), SpO2 92 %.    Physical Exam:  General Appearance:   Well-developed older woman in no acute distress   Head:   Normocephalic, atraumatic   Eyes:          Pupils equal and reactive to light.  Extraocular movements intact.   Ears:     Throat:  Oral mucosa moist.   Neck:  Trachea midline, no palpable thyroid   Back:      Lungs:    Symmetric chest expansion.  Prolonged expiration.  No wheeze or rhonchi    Heart:   Regular rhythm, S1, S2 auscultated, no murmur.  Occasional early beat   Abdomen:    Protuberant, bowel sounds present, soft, nontender   Rectal:     Deferred   Extremities:    Right femoral catheterization site without hematoma.  Feet are warm and viable.  No pitting edema   Pulses:      Skin:  Warm and dry   Lymph nodes:    Neurologic:  Alert and oriented.  Left facial droop.  Tongue midline.  Follows commands.  No motor drift upper or lower extremities.  Fine motor activity is clumsy on the left upper extremity       Results Review:   Lab Results (last 24 hours)     ** No results found for the last 24 hours. **        Imaging Results (Last 24 Hours)     Procedure Component Value Units Date/Time    CT Cerebral Perfusion With & Without Contrast [[id number]] Collected:  03/11/20 1135     Updated:  03/11/20 1200    Narrative:       EXAMINATION: CT CEREBRAL PERFUSION W WO CONTRAST-, CT HEAD WO CONTRAST-   03/11/2020     INDICATION: TIA, initial screening     TECHNIQUE: 5 mm unenhanced images through the brain. Subsequently,  cerebral perfusion analysis was performed using computed tomography with  contrast administration, including postprocessing of parametric mass  with determination of cerebral blood flow, cerebral blood volume and  mean transit  time.       The radiation dose reduction device was turned on for each scan per the  ALARA (As Low as Reasonably Achievable) protocol.     COMPARISON: Outside CT scan of 02/05/2015.     FINDINGS: HEAD CT SCAN WITHOUT CONTRAST: The calvarium appears intact.  Included paranasal sinuses and mastoids appear clear. Soft tissue window  images show low attenuation change in the right frontal white matter and  some loss of the gray/white matter interface over an at least 6 cm area.  The patient appears to have some residual intravascular contrast, but  allowing for this, no evidence of hemorrhage is identified. There is no  evidence of infarct elsewhere, no evidence of mass, mass effect,  hydrocephalus or abnormal extra-axial collection.       Impression:       1. Approximately 6 mm area of low attenuation in the right frontal lobe,  consistent with developing MCA territory edema/infarct.       2. Suggestion of residual intravascular contrast from an outside study.  Allowing for this, no evidence of hemorrhage is seen. No evidence of  other acute intracranial disease elsewhere.     CEREBRAL PERFUSION SCAN WITH AND WITHOUT CONTRAST: Rapid analysis shows  the area of developing edema on the unenhanced CT scan to contain an  area of cerebral blood flow less than 30% calculated at 29 mm consistent  with core infarct. Mapping for Tmax greater than six seconds shows a  larger area of potential ischemic penumbra, estimated at 60 mm with  mismatch volume of 30 mL. The individual imaging runs for Tmax, mean  transit time, time to drain and cerebral blood flow all show similar  sized abnormalities, with a significantly smaller central area on  cerebral blood volume, consistent with core infarct. No significant  asymmetry of perfusion is appreciated elsewhere.     IMPRESSION: Anterior right MCA territory infarct, with moderate  surrounding ischemic penumbra, as discussed above. No evidence of  ischemia/infarct elsewhere.     D:   03/11/2020  E:  03/11/2020       CT Head Without Contrast [[id number]] Collected:  03/11/20 1135     Updated:  03/11/20 1200    Narrative:       EXAMINATION: CT CEREBRAL PERFUSION W WO CONTRAST-, CT HEAD WO CONTRAST-   03/11/2020     INDICATION: TIA, initial screening     TECHNIQUE: 5 mm unenhanced images through the brain. Subsequently,  cerebral perfusion analysis was performed using computed tomography with  contrast administration, including postprocessing of parametric mass  with determination of cerebral blood flow, cerebral blood volume and  mean transit time.       The radiation dose reduction device was turned on for each scan per the  ALARA (As Low as Reasonably Achievable) protocol.     COMPARISON: Outside CT scan of 02/05/2015.     FINDINGS: HEAD CT SCAN WITHOUT CONTRAST: The calvarium appears intact.  Included paranasal sinuses and mastoids appear clear. Soft tissue window  images show low attenuation change in the right frontal white matter and  some loss of the gray/white matter interface over an at least 6 cm area.  The patient appears to have some residual intravascular contrast, but  allowing for this, no evidence of hemorrhage is identified. There is no  evidence of infarct elsewhere, no evidence of mass, mass effect,  hydrocephalus or abnormal extra-axial collection.       Impression:       1. Approximately 6 mm area of low attenuation in the right frontal lobe,  consistent with developing MCA territory edema/infarct.       2. Suggestion of residual intravascular contrast from an outside study.  Allowing for this, no evidence of hemorrhage is seen. No evidence of  other acute intracranial disease elsewhere.     CEREBRAL PERFUSION SCAN WITH AND WITHOUT CONTRAST: Rapid analysis shows  the area of developing edema on the unenhanced CT scan to contain an  area of cerebral blood flow less than 30% calculated at 29 mm consistent  with core infarct. Mapping for Tmax greater than six seconds shows  a  larger area of potential ischemic penumbra, estimated at 60 mm with  mismatch volume of 30 mL. The individual imaging runs for Tmax, mean  transit time, time to drain and cerebral blood flow all show similar  sized abnormalities, with a significantly smaller central area on  cerebral blood volume, consistent with core infarct. No significant  asymmetry of perfusion is appreciated elsewhere.     IMPRESSION: Anterior right MCA territory infarct, with moderate  surrounding ischemic penumbra, as discussed above. No evidence of  ischemia/infarct elsewhere.     D:  03/11/2020  E:  03/11/2020              PROBLEM LIST  Patient Active Problem List   Diagnosis   • Dizziness   • PVC (premature ventricular contraction)   • Smoker   • Palpitations   • Dyslipidemia on statin    • Essential hypertension   • R MCA AIS s/p tPA and thrombectomy    • Tissue plasminogen activator (tPA) administered at other facility within 24 hours prior to current admission       Assessment/Plan      #1 right middle cerebral artery, anterior segment stroke status post TPA and mechanical thrombectomy.  She does not have motor weakness.  She does have a facial droop.  Speech is mildly dysarthric.  Risk factors include high blood pressure, dyslipidemia, tobacco abuse.    #2 recent Holter monitor with PVCs and one episode of nonsustained ventricular tachycardia.  No previous history of MI.  Scheduled for heart catheterization to evaluate arrhythmias.  She currently does not have chest pain or acute ischemic changes.    #3 tobacco abuse, she does have an as needed albuterol inhaler.  She does need a screening CAT scan of the chest to look for lung cancer.  A chest x-ray might be reasonable.  Currently she has no purulent sputum or active bronchospasm.      1. Admit to ICU   2. STAT CTH/CTP (done)   3. Implement post-tPA ischemic stroke order set   4. Neurovascular checks per protocol   5. Nicardipine as needed   6. Echo with agitated saline    7. Carotid duplex   8. 24hr post-tPA CT head tomorrow @ 1000   9. Chest x-ray  10. PRN nebulized bronchodilators  11. If she passes her swallow evaluation resume sotalol  12. Monitor electrolytes and replace as needed    I discussed the patients findings and my recommendations with multidisciplinary team    PATRICK García, [facility], [facility]   Pulmonary and Critical Care     I personally reviewed her records, interviewed the patient and her daughter.  Performed the above physical examination.  Reviewed her laboratory data.  Looked at her EKG and CT scans.  I formulated the assessment and then modified the plan and note to reflect my additions and findings    Lenora Coto MD      Time:40min    This note was produced with a voice recognition program and may have uncorrected errors.       Electronically signed by Lenora Coto MD at 03/11/20 1458          Physician Progress Notes (last 24 hours) (Notes from 03/14/20 0851 through 03/15/20 0851)      Lenora Coto MD at 03/14/20 1131          Critical Care Note     LOS: 3 days   Patient Care Team:  Sameera Davenport APRN as PCP - General (Family Medicine)    Chief Complaint/Reason for visit:      Right middle cerebral artery stroke  Hemorrhagic conversion  History of nonsustained V. Tach  Probable COPD      Subjective     74-year-old woman presenting on March 11 with left-sided weakness and dysarthria.  She received TPA.  Perfusion scan showed occlusion of her right middle cerebral artery branch and she underwent thrombectomy.  Initial NIH stroke scale here was a 9.  Postprocedure her motor weakness resolved.  She had some persistent facial weakness.  She was more lethargic March 12 morning and underwent a CT scan that revealed hemorrhagic conversion.  She had an abnormal Holter monitor with frequent PVCs and a 9 beat run of ventricular tachycardia.  She was scheduled for an outpatient heart catheterization.  She does not have  "angina.    Interval History:     Denies headache.  NIH stroke scale 6.   3 L nasal cannula, saturation 97 200%  Remains in sinus rhythm  FEES yesterday revealed no penetration or aspiration with any consistencies.  She had some oral dysphasia with prolonged chewing and oral residue.  Tolerating a p.o. Diet  Ambulated 200 feet with assist of 1, impaired balance.    Review of Systems:    All systems were reviewed and negative except as noted in subjective.    Medical history, surgical history, social history, family history reviewed    Objective     Intake/Output:    Intake/Output Summary (Last 24 hours) at 3/14/2020 1132  Last data filed at 3/14/2020 0900  Gross per 24 hour   Intake 1380 ml   Output 725 ml   Net 655 ml       Nutrition:  Diet Soft Texture; Ground; Nectar / Syrup Thick; Cardiac    Infusions:    niCARdipine 5-15 mg/hr Last Rate: Stopped (03/12/20 2300)       Telemetry: Sinus rhythm             Vital Signs  Blood pressure 131/71, pulse 87, temperature 97.8 °F (36.6 °C), temperature source Oral, resp. rate 20, height 160 cm (63\"), weight 53.6 kg (118 lb 2.7 oz), SpO2 97 %.    Physical Exam:  General Appearance:   Older white woman, alert   Head:   Atraumatic   Eyes:          Pupils reactive to light   Ears:     Throat:  Oral mucosa moist   Neck:  Trachea midline, no palpable thyroid   Back:      Lungs:    Symmetric chest expansion.  Breath sounds are bilateral without wheeze or rhonchi    Heart:   Regular rhythm, S1, S2 auscultated, no murmur   Abdomen:    Bowel sounds present, soft, nontender   Rectal:   Deferred   Extremities:  No pitting edema or clubbing   Pulses:    Skin:  Warm and dry   Lymph nodes:    Neurologic:  Alert, oriented.  Mild left facial droop.  Left upper extremity is clumsy, mildly weak with a drift.  Persistent mild dysarthria..      Results Review:     I reviewed the patient's new clinical results.   Results from last 7 days   Lab Units 03/14/20  0514 03/12/20  1442 03/12/20  0329 "   SODIUM mmol/L 135*  --  138   POTASSIUM mmol/L 3.8 4.2 3.1*   CHLORIDE mmol/L 100  --  104   CO2 mmol/L 24.0  --  20.0*   BUN mg/dL 14  --  8   CREATININE mg/dL 0.46*  --  0.46*   CALCIUM mg/dL 8.8  --  8.8   BILIRUBIN mg/dL 0.4  --   --    ALK PHOS U/L 69  --   --    ALT (SGPT) U/L <5  --   --    AST (SGOT) U/L 12  --   --    GLUCOSE mg/dL 127*  --  109*     Results from last 7 days   Lab Units 03/14/20  0514 03/12/20  0328   WBC 10*3/mm3 10.96* 11.87*   HEMOGLOBIN g/dL 11.9* 13.9   HEMATOCRIT % 36.0 44.9   PLATELETS 10*3/mm3 230 227         No results found for: BLOODCX  No results found for: URINECX    I reviewed the patient's new imaging including images and reports.    FINDINGS:   The patient has a known right MCA territory infarct with evolving hemorrhagic transformation. The largest components of hemorrhage within the evolving infarct and surrounding area of vasogenic edema measure up to about 5.0 x 3.6 x 4.1 cm. There is  accompanying subarachnoid hemorrhage approaching the vertex, stable to less conspicuous than on the prior study. There is generalized edema throughout the right cerebral hemisphere. Compared to the prior examination, interval subtle worsening of mass  effect and midline shift from right to left of about 3 mm. Subtle but increasing effacement of the right lateral ventricle. The fourth ventricle and third ventricle remain patent.     No distinct evidence of intraventricular extension of hemorrhage. The globes are intact. Bones are intact. Sinuses are clear.     Estimated volume of intracranial hemorrhage: 36 mL     IMPRESSION:     1. Right MCA territory infarct with hemorrhagic transformation. Interval subtle worsening of mass effect with midline shift from right to left measuring about 3 mm. Interval subtle worsening of mass effect and effacement of the right lateral ventricle.  Persistent edema throughout the right cerebral hemisphere with stable to interval decrease in overall volume of  subarachnoid blood.  2. No new evidence of intraventricular extension of hemorrhage..        NOTIFICATION: Critical Value/emergent results were called by telephone at the time of interpretation on 3/13/2020 5:48 AM to RICKIE Aranda who verbally acknowledged these results.     Signer Name: Aleksandr Marquez MD   Signed: 3/13/2020 5:53 AM    All medications reviewed.     atorvastatin 80 mg Oral Nightly   ipratropium-albuterol 3 mL Nebulization 4x Daily - RT   metoprolol tartrate 5 mg Intravenous Q6H   nicotine 1 patch Transdermal Q24H         Assessment/Plan       R MCA AIS s/p tPA and thrombectomy     Tissue plasminogen activator (tPA) administered at other facility within 24 hours prior to current admission    Intracranial bleed (CMS/HCC); hemorrhagic conversion of an ischemic stroke    Smoker    Dyslipidemia on statin     Essential hypertension    #1 ischemic stroke, right middle cerebral artery status post TPA and thrombectomy.  March 12 she was more lethargic and CT scan confirmed hemorrhagic conversion.   NIHSS 7.  Fees evaluation yesterday and passed.  Ambulated 200 feet with physical therapy.  Headache resolved.    #2 hypertension changed to IV Lopressor because of her decreased alertness.  Now that she is more alert and has passed her swallow evaluation will transition to oral medications.  Goal is to keep her blood pressure less than 140    #3 history of tobacco use, currently without wheezing.  Oxygenation is adequate on 2 L nasal cannula    #4 evaluated by cardiology with plans for an outpatient heart catheterization secondary to multiple PVCs and one run of nonsustained V. tach.  Typically we do not do elective heart catheterizations in the hospital.  She is not having unstable angina or active arrhythmias currently.    PLAN:  Transition to oral blood pressure medication; metoprolol  Continue speech therapy, Occupational Therapy, physical therapy  Continue high-dose statin  Aspirin discontinued because of  hemorrhagic conversion  Neurosurgery would like Eliquis started on Friday  Repeat CT scan Monday    VTE Prophylaxis:SCDS    Stress Ulcer Prophylaxis: None    Lenora Coto MD  20  11:32      Time: 25min  I personally provided care to this critically ill patient as documented above.  Critical care time does not include time spent on separately billed procedures.  Non of my critical care time was concurrent with other critical care providers.     Electronically signed by Lenora Coto MD at 20 1141          Physical Therapy Notes (most recent note)      Evelyn Leggett, PT at 20 1410  Version 1 of 1         Patient Name: Maria Luz Wade  : 1945    MRN: [id number]                              Today's Date: 3/14/2020       Admit Date: 3/11/2020    Visit Dx:     ICD-10-CM ICD-9-CM   1. Dysphagia, unspecified type R13.10 787.20   2. Cognitive communication deficit R41.841 799.52     Patient Active Problem List   Diagnosis   • Dizziness   • PVC (premature ventricular contraction)   • Smoker   • Palpitations   • Dyslipidemia on statin    • Essential hypertension   • R MCA AIS s/p tPA and thrombectomy    • Tissue plasminogen activator (tPA) administered at other facility within 24 hours prior to current admission   • Intracranial bleed (CMS/HCC); hemorrhagic conversion of an ischemic stroke     Past Medical History:   Diagnosis Date   • History of tonsillectomy    • History of tubal ligation    • Hyperlipidemia      Past Surgical History:   Procedure Laterality Date   • CARDIOVASCULAR STRESS TEST  2017    L.Myoview- R/O Anterior Ischemia.   • CONVERTED (HISTORICAL) HOLTER  2020    AVG 77. . 6.8% PVC. one 6 beats run of V-Tach   • ECHO - CONVERTED  2017    EF 65%. Mild-Mod MR   • INTERVENTIONAL RADIOLOGY PROCEDURE Bilateral 3/11/2020    Procedure: CAROTID CEREBRAL ANGIOGRAM BILATERAL;  Surgeon: Adryan Carter MD;  Location: [facility]  INVASIVE LOCATION;  Service: Interventional Radiology;  Laterality: Bilateral;   • OTHER SURGICAL HISTORY  2015    CT Scan- 60% (L) ICA. 50% (R) ICA   • US CAROTID UNILATERAL  11/16/2017    @ [facility]. No sig stenosis     General Information     Row Name 03/14/20 1605          PT Evaluation Time/Intention    Document Type  therapy note (daily note)  -SR     Mode of Treatment  physical therapy  -SR     Row Name 03/14/20 1605          General Information    Patient Profile Reviewed?  yes  -SR     Existing Precautions/Restrictions  fall L-sided weakness, decreased sensation, L visual cut  -SR     Barriers to Rehab  visual deficit;cognitive status decreased processing, flat affect  -SR     Row Name 03/14/20 1605          Cognitive Assessment/Intervention- PT/OT    Orientation Status (Cognition)  oriented x 3  -SR     Row Name 03/14/20 1605          Safety Issues, Functional Mobility    Safety Issues Affecting Function (Mobility)  ability to follow commands;insight into deficits/self awareness;judgment;awareness of need for assistance;positioning of assistive device;problem solving;sequencing abilities  -SR     Impairments Affecting Function (Mobility)  balance;cognition;coordination;endurance/activity tolerance;grasp;motor control;motor planning;strength;sensation/sensory awareness;postural/trunk control  -SR       User Key  (r) = Recorded By, (t) = Taken By, (c) = Cosigned By    Initials Name Provider Type    SR Evelyn Leggett, PT Physical Therapist        Mobility     Row Name 03/14/20 1608          Bed Mobility Assessment/Treatment    Comment (Bed Mobility)  UIC  -SR     Row Name 03/14/20 1608          Transfer Assessment/Treatment    Comment (Transfers)  cues for HP and safety awareness  -SR     Row Name 03/14/20 1608          Sit-Stand Transfer    Sit-Stand [place] (Transfers)  minimum assist (75% patient effort);verbal cues  -SR     Assistive Device (Sit-Stand Transfers)  walker, front-wheeled  -SR     Row Name  03/14/20 1608          Gait/Stairs Assessment/Training    33254 - Gait Training Minutes   12  -SR     Gait/Stairs Assessment/Training  gait/ambulation assistive device  -SR     [place] Level (Gait)  moderate assist (50% patient effort)  -SR     Assistive Device (Gait)  walker, front-wheeled  -SR     Distance in Feet (Gait)  250  -SR     Pattern (Gait)  step-through  -SR     Deviations/Abnormal Patterns (Gait)  base of support, narrow;gait speed decreased;shad decreased;stride length decreased  -SR     Bilateral Gait Deviations  forward flexed posture;heel strike decreased  -SR     Left Sided Gait Deviations  weight shift ability decreased;leans left  -SR     Comment (Gait/Stairs)  needs A to hold L  on rw, mod A d/t significant L lean, cues for safety awareness  -SR       User Key  (r) = Recorded By, (t) = Taken By, (c) = Cosigned By    Initials Name Provider Type    SR Evelyn Leggett, PT Physical Therapist        Obj/Interventions     Row Name 03/14/20 1611          Therapeutic Exercise    Lower Extremity (Therapeutic Exercise)  heel slides, left;SLR (straight leg raise), left  -SR     Lower Extremity Range of Motion (Therapeutic Exercise)  ankle dorsiflexion/plantar flexion, left  -SR     Comment (Therapeutic Exercise)  PT education pt and family on benefits of ther ex.   -SR     Row Name 03/14/20 1611          Static Sitting Balance    Level of [place] (Unsupported Sitting, Static Balance)  supervision  -SR     Sitting Position (Unsupported Sitting, Static Balance)  sitting in chair  -SR     Time Able to Maintain Position (Unsupported Sitting, Static Balance)  more than 5 minutes  -SR     Row Name 03/14/20 1611          Static Standing Balance    Level of [place] (Supported Standing, Static Balance)  moderate assist, 50 to 74% patient effort  -SR     Time Able to Maintain Position (Supported Standing, Static Balance)  1 to 2 minutes  -SR     Assistive Device Utilized (Supported Standing,  Static Balance)  walker, rolling  -SR     Comment (Supported Standing, Static Balance)  L lean  -SR       User Key  (r) = Recorded By, (t) = Taken By, (c) = Cosigned By    Initials Name Provider Type    SR Evelyn Leggett, PT Physical Therapist        Goals/Plan    No documentation.       Clinical Impression     Row Name 03/14/20 1613          Pain Scale: Numbers Pre/Post-Treatment    Pain Scale: Numbers, Pretreatment  0/10 - no pain  -SR     Pain Scale: Numbers, Post-Treatment  0/10 - no pain  -SR     Row Name 03/14/20 1613          Plan of Care Review    Plan of Care Reviewed With  patient;daughter  -SR     Progress  improving  -SR     Outcome Summary  Pt required more A with gait training today with L lean and freq LOB noted, mod A with rw to ambulate x 250ft. Min A with transfers. VSS. Cont with IPPT, recommend IP rehab upon dc.   -SR     Row Name 03/14/20 1613          Positioning and Restraints    Pre-Treatment Position  sitting in chair/recliner  -SR     Post Treatment Position  chair  -SR     In Chair  notified nsg;reclined;sitting;call light within reach;encouraged to call for assist;with family/caregiver  -SR       User Key  (r) = Recorded By, (t) = Taken By, (c) = Cosigned By    Initials Name Provider Type    SR Evelyn Leggett, PT Physical Therapist        Outcome Measures     Row Name 03/14/20 1616          How much help from another person do you currently need...    Turning from your back to your side while in flat bed without using bedrails?  3  -SR     Moving from lying on back to sitting on the side of a flat bed without bedrails?  2  -SR     Moving to and from a bed to a chair (including a wheelchair)?  3  -SR     Climbing 3-5 steps with a railing?  2  -SR     To walk in hospital room?  2  -SR     Row Name 03/14/20 1616          Functional Assessment    Outcome Measure Options  AM-PAC 6 Clicks Basic Mobility (PT)  -SR       User Key  (r) = Recorded By, (t) = Taken By, (c) = Cosigned By    Initials  Name Provider Type    SR Evelyn Leggett, PT Physical Therapist          PT Recommendation and Plan     Outcome Summary/Treatment Plan (PT)  Anticipated Discharge Disposition (PT): inpatient rehabilitation facility  Plan of Care Reviewed With: patient, daughter  Progress: no change  Outcome Summary: Pt required more A with gait training today with L lean and freq LOB noted, mod A with rw to ambulate x 250ft. Min A with transfers. VSS. Cont with IPPT, recommend IP rehab upon dc.      Time Calculation:   PT Charges     Row Name 20 1617 20 1608          Time Calculation    Start Time  1410  -SR  --     PT Received On  20  -SR  --        Time Calculation- PT    Total Timed Code Minutes- PT  28 minute(s)  -SR  --        Timed Charges    86419 - Gait Training Minutes   --  12  -SR       User Key  (r) = Recorded By, (t) = Taken By, (c) = Cosigned By    Initials Name Provider Type    SR Evelyn Leggett, PT Physical Therapist        Therapy Charges for Today     Code Description Service Date Service Provider Modifiers Qty    12675496257 HC PT THER PROC EA 15 MIN 3/14/2020 Evelyn Leggett, PT GP 1    22025407634 HC GAIT TRAINING EA 15 MIN 3/14/2020 Evelyn Leggett, PT GP 1          PT G-Codes  Outcome Measure Options: AM-PAC 6 Clicks Basic Mobility (PT)  AM-PAC 6 Clicks Score (PT): 16  AM-PAC 6 Clicks Score (OT): 13  Modified [place] Scale: 3 - Moderate disability.  Requiring some help, but able to walk without assistance.    Evelyn Leggett PT  3/14/2020         Electronically signed by Evelyn Leggett, PT at 20 1618          Occupational Therapy Notes (most recent note)      Mary Freeman, OT at 20 1031          Acute Care - Occupational Therapy Initial Evaluation   [place]     Patient Name: Maria Luz Wade  : 1945  MRN: [id number]  Today's Date: 3/13/2020             Admit Date: 3/11/2020       ICD-10-CM ICD-9-CM   1. Dysphagia, unspecified type R13.10 787.20   2. Cognitive  communication deficit R41.841 799.52     Patient Active Problem List   Diagnosis   • Dizziness   • PVC (premature ventricular contraction)   • Smoker   • Palpitations   • Dyslipidemia on statin    • Essential hypertension   • R MCA AIS s/p tPA and thrombectomy    • Tissue plasminogen activator (tPA) administered at other facility within 24 hours prior to current admission   • Intracranial bleed (CMS/HCC); hemorrhagic conversion of an ischemic stroke     Past Medical History:   Diagnosis Date   • History of tonsillectomy 1951   • History of tubal ligation    • Hyperlipidemia      Past Surgical History:   Procedure Laterality Date   • CARDIOVASCULAR STRESS TEST  11/30/2017    L.Myoview- R/O Anterior Ischemia.   • CONVERTED (HISTORICAL) HOLTER  02/12/2020    AVG 77. . 6.8% PVC. one 6 beats run of V-Tach   • ECHO - CONVERTED  11/30/2017    EF 65%. Mild-Mod MR   • INTERVENTIONAL RADIOLOGY PROCEDURE Bilateral 3/11/2020    Procedure: CAROTID CEREBRAL ANGIOGRAM BILATERAL;  Surgeon: Adryan Carter MD;  Location: [facility] INVASIVE LOCATION;  Service: Interventional Radiology;  Laterality: Bilateral;   • OTHER SURGICAL HISTORY  2015    CT Scan- 60% (L) ICA. 50% (R) ICA   • US CAROTID UNILATERAL  11/16/2017    @ [facility]. No sig stenosis          OT ASSESSMENT FLOWSHEET (last 12 hours)      Occupational Therapy Evaluation     Row Name 03/13/20 1031                   OT Evaluation Time/Intention    Subjective Information  no complaints  -CL        Document Type  evaluation  -CL        Mode of Treatment  occupational therapy  -CL        Patient Effort  good  -CL           General Information    Patient Profile Reviewed?  yes  -CL        Prior Level of Function  independent:;all household mobility;transfer;ADL's;dressing;bathing  -CL        Equipment Currently Used at Home  hospital bed;commode, bedside;walker, rolling stair lift, tub lift seat  -CL        Existing Precautions/Restrictions  fall;other (see  comments) L sided field cut/inattention, L sided weakness  -CL        Barriers to Rehab  none identified  -CL           Relationship/Environment    Lives With  child(farzad), adult daughter  -CL           Resource/Environmental Concerns    Current Living Arrangements  home/apartment/condo  -CL           Cognitive Assessment/Interventions    Additional Documentation  Cognitive Assessment/Intervention (Group)  -CL           Cognitive Assessment/Intervention- PT/OT    Affect/Mental Status (Cognitive)  confused  -CL        Orientation Status (Cognition)  oriented to;person;time;verbal cues/prompts needed for orientation;place  -CL        Follows Commands (Cognition)  WFL  -CL        Cognitive Function (Cognitive)  WFL  -CL           Bed Mobility Assessment/Treatment    Comment (Bed Mobility)  UIC.   -CL           Transfer Assessment/Treatment    Comment (Transfers)  Defer to PT  -CL           ADL Assessment/Intervention    99380 - OT Self Care/Mgmt Minutes  10  -CL        BADL Assessment/Intervention  feeding  -CL           Self-Feeding Assessment/Training    [place] Level (Feeding)  liquids to mouth;scoop food and bring to mouth;supervision  -CL        Assistive Devices (Feeding)  adapted cup;built-up handle utensils  -CL        Position (Self-Feeding)  supported sitting  -CL           General ROM    GENERAL ROM COMMENTS  BUE grossly WFL.   -CL           MMT (Manual Muscle Testing)    General MMT Comments  RUE grossly 4-/5. LUE shldr F 3+/5, bicep/tricep 3+/5,  3+/5  -CL           Motor Assessment/Interventions    Additional Documentation  Balance (Group);Therapeutic Exercise (Group);Gross Motor Coordination (Group)  -CL           Gross Motor Coordination    Gross Motor Impairments  finger to nose  -CL        Gross Motor Skill, Impairments Detail  LUE moderately impaired  -CL           Balance    Balance  static sitting balance;dynamic sitting balance  -CL           Static Sitting Balance    Level of [place]  (Unsupported Sitting, Static Balance)  supervision  -CL        Sitting Position (Unsupported Sitting, Static Balance)  sitting in chair  -CL           Dynamic Sitting Balance    Level of [place], Reaches Outside Midline (Sitting, Dynamic Balance)  supervision  -CL        Sitting Position, Reaches Outside Midline (Sitting, Dynamic Balance)  sitting in chair  -CL           Sensory Assessment/Intervention    Sensory General Assessment  light touch sensation deficits identified  -CL        Additional Documentation  Vision Assessment/Intervention (Group)  -CL           Light Touch Sensation Assessment    Left Upper Extremity: Light Touch Sensation Assessment  moderate impairment, 50 to 74% correct responses  -CL        Right Upper Extremity: Light Touch Sensation Assessment  moderate impairment, 50 to 74% correct responses;other (see comments) CTS  -CL           Vision Assessment/Intervention    Visual Impairment/Limitations  peripheral vision impaired right  -CL        Visual Processing Deficit  visual attention, right  -CL           Positioning and Restraints    Pre-Treatment Position  sitting in chair/recliner  -CL        Post Treatment Position  chair  -CL        In Chair  notified nsg;sitting;call light within reach;encouraged to call for assist;with PT;with family/caregiver transitioned to PT treatment  -CL           Pain Scale: Numbers Pre/Post-Treatment    Pain Scale: Numbers, Pretreatment  0/10 - no pain  -CL        Pain Scale: Numbers, Post-Treatment  0/10 - no pain  -CL           Clinical Impression (OT)    OT Diagnosis  Decreased independence in ADLs.   -CL        Patient/Family Goals Statement (OT Eval)  Return to PLOF.   -CL        Criteria for Skilled Therapeutic Interventions Met (OT Eval)  yes;treatment indicated  -CL        Rehab Potential (OT Eval)  good, to achieve stated therapy goals  -CL        Therapy Frequency (OT Eval)  daily  -CL        Anticipated Equipment Needs at Discharge (OT)  -- TBA  further  -CL        Anticipated Discharge Disposition (OT)  inpatient rehabilitation facility  -CL           Vital Signs    Pre Systolic BP Rehab  138  -CL        Pre Treatment Diastolic BP  99  -CL        Pretreatment Heart Rate (beats/min)  87  -CL        Pre SpO2 (%)  93  -CL        O2 Delivery Pre Treatment  room air  -CL        Pre Patient Position  Sitting  -CL        Intra Patient Position  Sitting  -CL        Post Patient Position  Sitting  -CL           OT Goals    Transfer Goal Selection (OT)  --  -CL        Dressing Goal Selection (OT)  dressing, OT goal 1  -CL        Toileting Goal Selection (OT)  toileting, OT goal 1  -CL        Self-Feeding Goal Selection (OT)  self feeding, OT goal 1  -CL        Safety Awareness Goal Selection (OT)  safety awareness, OT goal 1  -CL        Additional Documentation  Self-Feeding Goal Selection (OT) (Row);Safety Awareness Goal Selection (OT) (Row)  -CL           Dressing Goal 1 (OT)    Activity/Assistive Device (Dressing Goal 1, OT)  lower body dressing don socks/pants  -CL        [place]/Cues Needed (Dressing Goal 1, OT)  supervision required  -CL        Time Frame (Dressing Goal 1, OT)  long term goal (LTG);10 days  -CL        Progress/Outcome (Dressing Goal 1, OT)  goal ongoing  -CL           Toileting Goal 1 (OT)    Activity/Device (Toileting Goal 1, OT)  adjust/manage clothing;perform perineal hygiene  -CL        [place] Level/Cues Needed (Toileting Goal 1, OT)  supervision required  -CL        Time Frame (Toileting Goal 1, OT)  long term goal (LTG);10 days  -CL        Progress/Outcome (Toileting Goal 1, OT)  goal ongoing  -CL           Self-Feeding Goal 1 (OT)    Activity/Assistive Device (Self-Feeding Goal 1, OT)  liquids to mouth;scoop food and bring to mouth  -CL        [place] Level/Cues Needed (Self-Feeding Goal 1, OT)  supervision required  -CL        Time Frame (Self-Feeding Goal 1, OT)  long term goal (LTG);10 days  -CL         Progress/Outcomes (Self-Feeding Goal 1, OT)  goal ongoing  -CL           Safety Awareness Goal 1 (OT)    Activity (Safety Awareness Goal 1, OT)  awareness of left side  -CL        [place]/Cues/Accuracy (Safety Awareness Goal 1, OT)  with minimum;physical/tactile cues;verbal cues/redirection;with 90% accuracy;with additional processing time  -CL        Time Frame (Safety Awareness Goal 1, OT)  long term goal (LTG);10 days  -CL        Progress/Outcome (Safety Awareness Goal 1, OT)  goal ongoing  -CL          User Key  (r) = Recorded By, (t) = Taken By, (c) = Cosigned By    Initials Name Effective Dates    CL Mary Freeman, OT 04/03/18 -                OT Recommendation and Plan  Outcome Summary/Treatment Plan (OT)  Anticipated Equipment Needs at Discharge (OT): (TBA further)  Anticipated Discharge Disposition (OT): inpatient rehabilitation facility  Therapy Frequency (OT Eval): daily  Plan of Care Review  Plan of Care Reviewed With: patient  Plan of Care Reviewed With: patient  Outcome Summary: OT completed a brief chart review. Pt limited d/t L sided field cut and weakness. Pt educated on AE use to improve independence w/ self-feeding d/t B hand numbness. Recommend cont skilled IPOT POC. Recommend pt DC to IP rehab.    Outcome Measures     Row Name 03/13/20 1031             How much help from another is currently needed...    Putting on and taking off regular lower body clothing?  2  -CL      Bathing (including washing, rinsing, and drying)  2  -CL      Toileting (which includes using toilet bed pan or urinal)  2  -CL      Putting on and taking off regular upper body clothing  2  -CL      Taking care of personal grooming (such as brushing teeth)  2  -CL      Eating meals  3  -CL      AM-PAC 6 Clicks Score (OT)  13  -CL         Modified [place] Scale    Pre-Stroke Modified [place] Scale  0 - No Symptoms at all.  -CL      Modified [place] Scale  3 - Moderate disability.  Requiring some help, but able to walk without  assistance.  -CL         Functional Assessment    Outcome Measure Options  AM-PAC 6 Clicks Daily Activity (OT);Modified [place]  -CL        User Key  (r) = Recorded By, (t) = Taken By, (c) = Cosigned By    Initials Name Provider Type    CL Mary Freeman OT Occupational Therapist          Time Calculation:   Time Calculation- OT     Row Name 20 1031             Time Calculation- OT    OT Start Time  1031  -CL      OT Received On  20  -CL      OT Goal Re-Cert Due Date  20  -CL         Timed Charges    57598 - OT Self Care/Mgmt Minutes  10  -CL        User Key  (r) = Recorded By, (t) = Taken By, (c) = Cosigned By    Initials Name Provider Type    CL Mary Freeman OT Occupational Therapist        Therapy Charges for Today     Code Description Service Date Service Provider Modifiers Qty    48778980401 HC OT SELF CARE/MGMT/TRAIN EA 15 MIN 3/13/2020 Mary Freeman OT GO 1    85520446270 HC OT EVAL LOW COMPLEXITY 3 3/13/2020 Mary Freeman OT GO 1               Mary Freeman OT  3/13/2020    Electronically signed by Mary Freeman OT at 20 1201          Speech Language Pathology Notes (most recent note)      Callie Childs MA,CCC-SLP at 20 1528          Acute Care - Speech Language Pathology   Swallow Re-Evaluation [facility]   Fiberoptic Endoscopic Evaluation of Swallowing (FEES)       Patient Name: Maria Luz Wade  : 1945  MRN: [id number]  Today's Date: 3/13/2020               Admit Date: 3/11/2020    Visit Dx:     ICD-10-CM ICD-9-CM   1. Dysphagia, unspecified type R13.10 787.20   2. Cognitive communication deficit R41.841 799.52     Patient Active Problem List   Diagnosis   • Dizziness   • PVC (premature ventricular contraction)   • Smoker   • Palpitations   • Dyslipidemia on statin    • Essential hypertension   • R MCA AIS s/p tPA and thrombectomy    • Tissue plasminogen activator (tPA) administered at other facility within 24 hours prior to current admission   • Intracranial  bleed (CMS/HCC); hemorrhagic conversion of an ischemic stroke     Past Medical History:   Diagnosis Date   • History of tonsillectomy 1951   • History of tubal ligation    • Hyperlipidemia      Past Surgical History:   Procedure Laterality Date   • CARDIOVASCULAR STRESS TEST  11/30/2017    L.Myoview- R/O Anterior Ischemia.   • CONVERTED (HISTORICAL) HOLTER  02/12/2020    AVG 77. . 6.8% PVC. one 6 beats run of V-Tach   • ECHO - CONVERTED  11/30/2017    EF 65%. Mild-Mod MR   • INTERVENTIONAL RADIOLOGY PROCEDURE Bilateral 3/11/2020    Procedure: CAROTID CEREBRAL ANGIOGRAM BILATERAL;  Surgeon: Adryan Carter MD;  Location:  [facility] CATH INVASIVE LOCATION;  Service: Interventional Radiology;  Laterality: Bilateral;   • OTHER SURGICAL HISTORY  2015    CT Scan- 60% (L) ICA. 50% (R) ICA   • US CAROTID UNILATERAL  11/16/2017    @ [facility]. No sig stenosis        SWALLOW EVALUATION (last 72 hours)      SLP Adult Swallow Evaluation     Row Name 03/13/20 1330 03/13/20 1015 03/12/20 1345 03/12/20 0930 03/11/20 1400       Rehab Evaluation    Document Type  re-evaluation  -VO  re-evaluation  -VO  evaluation  -SA  re-evaluation  -SM  evaluation  -AV    Subjective Information  no complaints  -VO  no complaints  -VO  no complaints  -SA  --  no complaints  -AV    Patient Observations  alert;cooperative  -VO  alert;cooperative  -VO  alert;cooperative  -SA  lethargic  -SM  alert;cooperative  -AV    Patient/Family Observations  --  dtr present  -VO  Family stated alertness fluctuation throughout day, however, presented as much more alert during eval when compared to previous SLP attempt.   -SA  Family present  -SM  no family present   -AV    Patient Effort  good  -VO  good  -VO  good  -SA  --  good  -AV    Comment  --  --  --  Eval performed with   -  --       General Information    Patient Profile Reviewed  --  yes  -VO  yes  -SA  --  yes  -AV    Pertinent History Of Current Problem  --  see previous  -VO  CVA post TPA  and thrombectomy; RN notified SLP about improvement in medical status   -  RN consulted after family noted coughing with intake.   -  CVA post TPA and thrombectomy  -    Current Method of Nutrition  --  --  NPO  -  regular textures;thin liquids though holding trays  -  NPO  -AV    Precautions/Limitations, Vision  --  --  WFL;for purposes of eval  -SA  --  WFL;for purposes of eval  -AV    Precautions/Limitations, Hearing  --  --  WFL;for purposes of eval  -SA  --  WFL;for purposes of eval  -AV    Prior Level of Function-Communication  --  --  --  --  WFL  -    Prior Level of Function-Swallowing  --  --  no diet consistency restrictions  -  --  no diet consistency restrictions  -AV    Plans/Goals Discussed with  --  --  patient and family;agreed upon  -  family  -  patient  -AV    Barriers to Rehab  --  --  --  medically complex  -  medically complex  -AV    Patient's Goals for Discharge  --  --  patient did not state  -  patient could not state  -  return to PO diet  -    Family Goals for Discharge  --  --  family did not state  -  patient able to eat/drink without coughing/choking  -  --       Pain Assessment    Additional Documentation  --  --  Pain Scale: Numbers Pre/Post-Treatment (Group)  -  --  Pain Scale: FACES Pre/Post-Treatment (Group)  -       Pain Scale: Numbers Pre/Post-Treatment    Pain Scale: Numbers, Pretreatment  0/10 - no pain  -VO  0/10 - no pain  -VO  --  --  --    Pain Scale: Numbers, Post-Treatment  0/10 - no pain  -VO  0/10 - no pain  -VO  8/10  -  --  --    Pain Location  --  --  head  -  --  --    Pre/Post Treatment Pain Comment  --  --  Pt stated increased presssure on right side of head- notified RN   -  --  --       Pain Scale: FACES Pre/Post-Treatment    Pain: FACES Scale, Pretreatment  --  --  --  0-->no hurt  -SM  0-->no hurt  -AV    Pain: FACES Scale, Post-Treatment  --  --  --  0-->no hurt  -SM  0-->no hurt  -AV       Oral Motor and Function     Dentition Assessment  --  upper dentures/partial in place;lower dentures/partial in place  -VO  upper dentures/partial in place;lower dentures/partial in place  -SA  --  edentulous, dentures not available  -AV    Secretion Management  --  WNL/WFL  -VO  --  --  dried secretions in oral cavity  -AV    Mucosal Quality  --  moist, healthy  -VO  --  --  dry  -AV    Volitional Swallow  --  WFL  -VO  --  --  WFL  -AV    Volitional Cough  --  WFL  -VO  --  --  WFL  -AV       Oral Musculature and Cranial Nerve Assessment    Oral Motor General Assessment  --  oral labial or buccal impairment  -VO  WFL  -SA  unable to assess  -SM  oral labial or buccal impairment  -AV    Oral Labial or Buccal Impairment, Detail, Cranial Nerve VII (Facial):  --  left labial droop  -VO  --  --  --       General Eating/Swallowing Observations    Respiratory Support Currently in Use  --  --  --  --  nasal cannula  -AV    Eating/Swallowing Skills  --  --  self-fed  -SA  --  fed by SLP  -AV    Positioning During Eating  --  --  upright in bed  -SA  --  upright in bed  -AV    Utensils Used  --  --  spoon;cup;straw  -SA  --  spoon;cup;straw  -AV    Consistencies Trialed  --  --  regular textures;pureed;nectar/syrup-thick liquids  -SA  --  regular textures;pureed;thin liquids  -AV       Clinical Swallow Eval    Oral Prep Phase  --  impaired  -VO  impaired  -SA  --  WFL  -AV    Oral Transit  --  --  --  -SA  --  WFL  -AV    Oral Residue  --  impaired  -VO  --  --  WFL  -AV    Pharyngeal Phase  --  suspected pharyngeal impairment  -VO  suspected pharyngeal impairment  -SA  --  no overt signs/symptoms of pharyngeal impairment  -AV    Esophageal Phase  --  unremarkable  -VO  --  --  unremarkable  -AV    Clinical Swallow Evaluation Summary  --  Cough w/ thins via straw. Concerns for coughing overnight and taking multiple swallows to get foods/drinks down. Will f/u w/ FEES to r/o dysphagia.  -VO  Pt seen for clinical swallow re-evaluation d/t to change in  status from last CSE on 3/12. Attempted to see pt for re-eval earlier during the day, but pt not alert enough for PO. RN notified SLP about improvement in alertness. Clinical re-eval completed. Pt given trials of ice chips x1, tsp thin x1, nectar thick liquid via tsp/cup/straw, puree, and regular solid. Cough w/ ice chip and thin liquid. No s/sxs w/ all trials of nectar and puree. Pocketing w/ solid regular that was cleared after liquid wash x2. Rec soft solids w/ nectar thick liquids. Re-assess at bedside tomorrow before instrumental to further assess swallow function.   -SA  could not alert pt enough for safe PO intake. Alerted for a few seconds at a time with max cueing. Following assessment, RN informed pt has had a chnage in status with new large bleed. MD has made pt NPO.  -  CSE completed this pm: no overt s/s with trials at bedside. Safe for reg and thins at this time. Will follow up tomorrow for SLC eval.   -AV       Oral Prep Concerns    Oral Prep Concerns  --  prolonged mastication;anterior loss  -VO  prolonged mastication Pocketing that cleared w/ liquid wash x2   -SA  --  --    Prolonged Mastication  --  regular consistencies  -VO  regular consistencies  -SA  --  --    Anterior Loss  --  thin;nectar  -VO  --  --  --       Oral Residue Concerns    Oral Residue Concerns  --  lateral sulcus residue, left  -VO  --  --  --    Lateral Sulcus Residue, Left  --  regular consistencies  -VO  --  --  --    Diffuse Residue Throughout Oral Cavity  --  regular consistencies  -VO  --  --  --       Pharyngeal Phase Concerns    Pharyngeal Phase Concerns  --  cough  -VO  cough  -SA  --  --    Cough  --  thin  -VO  thin  -SA  --  --       MBS/VFSS    Utensils Used  --  --  spoon;cup;straw  -SA  --  --       Fiberoptic Endoscopic Evaluation of Swallowing (FEES)    Risks/Benefits Reviewed  risks/benefits explained;patient;family;agreed to eval  -VO  --  --  --  --    Nasal Entry  right:  -VO  --  --  --  --    Special  Considerations  837  -VO  --  --  --  --       Anatomy and Physiology    Anatomic Considerations  no anatomic structural deviation  -VO  --  --  --  --    Velopharyngeal  WFL  -VO  --  --  --  --    Base of Tongue  symmetrical  -VO  --  --  --  --    Epiglottis  WFL  -VO  --  --  --  --    Laryngeal Function Breathing  symmetrical  -VO  --  --  --  --    Laryngeal Function Phonation  symmetrical  -VO  --  --  --  --    Laryngeal Function to Breath Hold  TVF/FVF/Arytenoid  -VO  --  --  --  --    Secretion Rating Scale (Mitch nava alMelvin 1996)  0- normal, no visible secretions  -VO  --  --  --  --    Spontaneous Swallow  frequency adequate  -VO  --  --  --  --    Sensory  sensed scope  -VO  --  --  --  --    Consistencies Trialed  thin liquids;pudding/puree;Regular textures  -VO  --  --  --  --       FEES Interpretation    Oral Phase  WFL  -VO  --  --  --  --       Initiation of Pharyngeal Swallow    Initiation of Pharyngeal Swallow  WFL  -VO  --  --  --  --    Pharyngeal Phase  functional pharyngeal phase of swallow  -VO  --  --  --  --    FEES Summary  No penetration/aspiration w/ any consistency. No significant residue. Mild-moderat oral dysphagia c/b labial/lingual weakness resulting in prolonged mastication and oral residue and pocketing of foods on L side of oral cavity. Requires cues to lingual sweep/wash. Rec soft/ground, thins.   -VO  --  --  --  --       Clinical Impression    SLP Swallowing Diagnosis  mild-moderate;oral dysfunction;functional pharyngeal phase  -VO  mild-moderate;oral dysfunction;pharyngeal dysfunction  -VO  suspected pharyngeal dysfunction  -SA  --  functional oral phase;functional pharyngeal phase  -AV    Functional Impact  risk of malnutrition;risk of aspiration/pneumonia  -VO  risk of aspiration/pneumonia  -VO  risk of aspiration/pneumonia  -SA  --  no impact on function  -AV    Rehab Potential/Prognosis, Swallowing  good, to achieve stated therapy goals  -VO  good, to achieve stated therapy  goals  -VO  good, to achieve stated therapy goals  -SA  --  good, to achieve stated therapy goals  -AV    Swallow Criteria for Skilled Therapeutic Interventions Met  demonstrates skilled criteria  -VO  demonstrates skilled criteria  -VO  demonstrates skilled criteria  -SA  --  baseline status  -AV       Recommendations    Therapy Frequency (Swallow)  3 days per week  -VO  evaluation only  -VO  evaluation only  -SA  --  evaluation only  -AV    Predicted Duration Therapy Intervention (Days)  until discharge  -VO  until discharge  -VO  until discharge  -SA  --  --    SLP Diet Recommendation  soft textures;ground;thin liquids  -VO  mechanical soft with no mixed consistencies;nectar thick liquids  -VO  mechanical soft with no mixed consistencies;nectar thick liquids  -SA  NPO  -SM  regular textures;thin liquids  -AV    Recommended Diagnostics  --  FEES  -VO  FEES  -SA  other (see comments) will check back tomorrow for status  -SM  --    Recommended Precautions and Strategies  upright posture during/after eating;small bites of food and sips of liquid;check mouth frequently for oral residue/pocketing  -VO  upright posture during/after eating;small bites of food and sips of liquid  -VO  upright posture during/after eating;small bites of food and sips of liquid  -SA  --  --    SLP Rec. for Method of Medication Administration  meds whole;with thin liquids;as tolerated  -VO  meds whole;with pudding or applesauce;as tolerated  -VO  meds whole;with pudding or applesauce;as tolerated  -SA  -- as per MD  -SM  meds whole;as tolerated  -AV    Monitor for Signs of Aspiration  yes;notify SLP if any concerns  -VO  yes;notify SLP if any concerns  -VO  yes;notify SLP if any concerns  -SA  --  yes;notify SLP if any concerns  -AV    Anticipated Dischage Disposition  inpatient rehabilitation facility;anticipate therapy at next level of care  -VO  unknown;anticipate therapy at next level of care  -VO  unknown;anticipate therapy at next level  of care  -SA  --  unknown;anticipate therapy at next level of care  -AV      User Key  (r) = Recorded By, (t) = Taken By, (c) = Cosigned By    Initials Name Effective Dates    BRENDAN Case Yola M, MS CCC-SLP 06/22/15 -     AV Sarah LaytonEsmer rudolph, MS CCC-SLP 05/23/19 -     VO Callie Childs MA,CCC-SLP 10/24/18 -     Inés Couch, SLP 02/25/20 -           EDUCATION  The patient has been educated in the following areas:   Dysphagia (Swallowing Impairment) Oral Care/Hydration Modified Diet Instruction.    SLP Recommendation and Plan  SLP Swallowing Diagnosis: mild-moderate, oral dysfunction, functional pharyngeal phase  SLP Diet Recommendation: soft textures, ground, thin liquids  Recommended Precautions and Strategies: upright posture during/after eating, small bites of food and sips of liquid, check mouth frequently for oral residue/pocketing  SLP Rec. for Method of Medication Administration: meds whole, with thin liquids, as tolerated     Monitor for Signs of Aspiration: yes, notify SLP if any concerns  Recommended Diagnostics: FEES  Swallow Criteria for Skilled Therapeutic Interventions Met: demonstrates skilled criteria  Anticipated Dischage Disposition: inpatient rehabilitation facility, anticipate therapy at next level of care  Rehab Potential/Prognosis, Swallowing: good, to achieve stated therapy goals  Therapy Frequency (Swallow): 3 days per week  Predicted Duration Therapy Intervention (Days): until discharge       Plan of Care Reviewed With: patient, family    SLP GOALS     Row Name 03/13/20 1701             Oral Nutrition/Hydration Goal 1 (SLP)    Oral Nutrition/Hydration Goal 1, SLP  LTG: Pt will return to regular diet, thin liquids w/o aspiration or difficulty.  -VO      Time Frame (Oral Nutrition/Hydration Goal 1, SLP)  short term goal (STG)  -VO      Progress/Outcomes (Oral Nutrition/Hydration Goal 1, SLP)  goal ongoing  -VO         Oral Nutrition/Hydration Goal 2 (SLP)    Oral  Nutrition/Hydration Goal 2, SLP  Pt will demonstrate adequate mastication of solid w/o oral pocketing/residue w/ 100% acc w/o cues.  -VO      Time Frame (Oral Nutrition/Hydration Goal 2, SLP)  short term goal (STG)  -VO      Progress/Outcomes (Oral Nutrition/Hydration Goal 2, SLP)  goal ongoing  -VO         Lingual Strengthening Goal 1 (SLP)    Activity (Lingual Strengthening Goal 1, SLP)  increase lingual tone/sensation/control/coordination/movement  -VO      Increase Lingual Tone/Sensation/Control/Coordination/Movement  swallow trials;lingual resistance exercises  -VO      [place]/Accuracy (Lingual Strengthening Goal 1, SLP)  with minimal cues (75-90% accuracy)  -VO      Time Frame (Lingual Strengthening Goal 1, SLP)  short term goal (STG)  -VO      Progress/Outcomes (Lingual Strengthening Goal 1, SLP)  goal ongoing  -VO         Swallow Compensatory Strategies Goal 1 (SLP)    Activity (Swallow Compensatory Strategies/Techniques Goal 1, SLP)  compensatory strategies;food/liquid placed on stronger right side;alternate food/liquid intake lingual sweep  -VO      [place]/Accuracy (Swallow Compensatory Strategies/Techniques Goal 1, SLP)  with minimal cues (75-90% accuracy)  -VO      Time Frame (Swallow Compensatory Strategies/Techniques Goal 1, SLP)  short term goal (STG)  -VO      Progress/Outcomes (Swallow Compensatory Strategies/Techniques Goal 1, SLP)  goal ongoing  -VO        User Key  (r) = Recorded By, (t) = Taken By, (c) = Cosigned By    Initials Name Provider Type    Callie Paredes MA,CCC-SLP Speech and Language Pathologist             Time Calculation:   Time Calculation- SLP     Row Name 03/13/20 1527 03/13/20 1311          Time Calculation- SLP    SLP Start Time  1330  -VO  1015  -VO     SLP Received On  03/13/20  -VO  03/13/20  -VO       User Key  (r) = Recorded By, (t) = Taken By, (c) = Cosigned By    Initials Name Provider Type    Callie Paredes MA,CCC-SLP Speech and Language  Pathologist          Therapy Charges for Today     Code Description Service Date Service Provider Modifiers Qty    [id number] HC ST EVAL ORAL PHARYNG SWALLOW 3 3/13/2020 Callie Childs MA,CCC-SLP GN 1    [id number]  ST FIBEROPTIC ENDO EVAL SWALL 4 3/13/2020 Callie Childs MA,CCC-SLP GN 1               Callie Childs MA,CCC-SLP  3/13/2020    Electronically signed by Callie Childs MA,CCC-SLP at 03/13/20 1528

## 2020-03-15 NOTE — PROGRESS NOTES
Continued Stay Note  [facility]     Patient Name: Maria Luz Wade  MRN: [id number]  Today's Date: 3/15/2020    Admit Date: 3/11/2020    Discharge Plan     Row Name 03/15/20 0855       Plan    Plan  TBD    Plan Comments  Received phone call from patient's RN, Evelyn, that family had made arrangements for an inpatient rehab bed at [facility]. CM note from Friday indicated a referral had been made, but there was no indication of a confirmed bed offer. Spoke with YUKI Medina at [facility]. She states they do not have any acute beds available today and that the MD has not approved any admissions for today. CM faxed a new referral to the RN station, fax [phone]. Carol states they will know around 9 a.m. on Monday whether they will have bed availability that day. CM will continue to follow.         Discharge Codes    No documentation.       Expected Discharge Date and Time     Expected Discharge Date Expected Discharge Time    Mar 16, 2020             Marge Aguirre RN

## 2020-03-15 NOTE — PLAN OF CARE
Problem: Patient Care Overview  Goal: Plan of Care Review  Outcome: Ongoing (interventions implemented as appropriate)  Flowsheets (Taken 3/15/2020 1501)  Progress: improving  Plan of Care Reviewed With: patient; daughter  Outcome Summary: Awaiting rehab transfer. Orders to tele. VSS, will continue to monitor.

## 2020-03-16 ENCOUNTER — APPOINTMENT (OUTPATIENT)
Dept: CT IMAGING | Facility: HOSPITAL | Age: 75
End: 2020-03-16

## 2020-03-16 VITALS
OXYGEN SATURATION: 95 % | WEIGHT: 118.17 LBS | BODY MASS INDEX: 20.94 KG/M2 | HEART RATE: 97 BPM | DIASTOLIC BLOOD PRESSURE: 87 MMHG | HEIGHT: 63 IN | SYSTOLIC BLOOD PRESSURE: 146 MMHG | TEMPERATURE: 98.3 F | RESPIRATION RATE: 18 BRPM

## 2020-03-16 PROCEDURE — 92523 SPEECH SOUND LANG COMPREHEN: CPT

## 2020-03-16 PROCEDURE — 97116 GAIT TRAINING THERAPY: CPT

## 2020-03-16 PROCEDURE — 92610 EVALUATE SWALLOWING FUNCTION: CPT

## 2020-03-16 PROCEDURE — 97535 SELF CARE MNGMENT TRAINING: CPT

## 2020-03-16 PROCEDURE — 70450 CT HEAD/BRAIN W/O DYE: CPT

## 2020-03-16 PROCEDURE — 99239 HOSP IP/OBS DSCHRG MGMT >30: CPT | Performed by: INTERNAL MEDICINE

## 2020-03-16 PROCEDURE — 97110 THERAPEUTIC EXERCISES: CPT

## 2020-03-16 RX ORDER — DULOXETIN HYDROCHLORIDE 20 MG/1
20 CAPSULE, DELAYED RELEASE ORAL DAILY
Status: DISCONTINUED | OUTPATIENT
Start: 2020-03-16 | End: 2020-03-16 | Stop reason: HOSPADM

## 2020-03-16 RX ORDER — ATORVASTATIN CALCIUM 80 MG/1
80 TABLET, FILM COATED ORAL NIGHTLY
Start: 2020-03-16 | End: 2021-01-01

## 2020-03-16 RX ORDER — BUSPIRONE HYDROCHLORIDE 10 MG/1
10 TABLET ORAL 3 TIMES DAILY PRN
Status: DISCONTINUED | OUTPATIENT
Start: 2020-03-16 | End: 2020-03-16 | Stop reason: HOSPADM

## 2020-03-16 RX ORDER — ACETAMINOPHEN 325 MG/1
650 TABLET ORAL EVERY 4 HOURS PRN
Start: 2020-03-16

## 2020-03-16 RX ORDER — AMLODIPINE BESYLATE 5 MG/1
5 TABLET ORAL
Status: DISCONTINUED | OUTPATIENT
Start: 2020-03-16 | End: 2020-03-16 | Stop reason: HOSPADM

## 2020-03-16 RX ORDER — NICOTINE 21 MG/24HR
1 PATCH, TRANSDERMAL 24 HOURS TRANSDERMAL
Start: 2020-03-17 | End: 2020-11-18

## 2020-03-16 RX ADMIN — NICOTINE 1 PATCH: 21 PATCH, EXTENDED RELEASE TRANSDERMAL at 08:24

## 2020-03-16 RX ADMIN — ACETAMINOPHEN 650 MG: 325 TABLET, FILM COATED ORAL at 04:42

## 2020-03-16 RX ADMIN — AMLODIPINE BESYLATE 5 MG: 5 TABLET ORAL at 12:03

## 2020-03-16 RX ADMIN — SERTRALINE HYDROCHLORIDE 25 MG: 50 TABLET ORAL at 12:03

## 2020-03-16 RX ADMIN — DULOXETINE HYDROCHLORIDE 20 MG: 20 CAPSULE, DELAYED RELEASE ORAL at 12:03

## 2020-03-16 RX ADMIN — METOPROLOL TARTRATE 25 MG: 25 TABLET ORAL at 08:22

## 2020-03-16 RX ADMIN — ACETAMINOPHEN 650 MG: 325 TABLET, FILM COATED ORAL at 15:57

## 2020-03-16 RX ADMIN — BUDESONIDE 9 MG: 3 CAPSULE ORAL at 10:13

## 2020-03-16 NOTE — DISCHARGE SUMMARY
[facility] Medicine Services  DISCHARGE SUMMARY    Patient Name: Maria Luz Wade  : 1945  MRN: [id number]    Date of Admission: 3/11/2020 11:04 AM  Date of Discharge:  3/16/2020  Primary Care Physician: Sameera Davenport APRN    Consults     No orders found from 2020 to 3/12/2020.          Hospital Course     Presenting Problem:   CVA (cerebral vascular accident) (CMS/HCC) [I63.9]  Tissue plasminogen activator (tPA) administered at other facility within 24 hours prior to current admission [Z92.82]    Active Hospital Problems    Diagnosis  POA   • **R MCA AIS s/p tPA and thrombectomy  [I63.9]  Yes   • Intracranial bleed (CMS/HCC); hemorrhagic conversion of an ischemic stroke [I62.9]  No   • Tissue plasminogen activator (tPA) administered at other facility within 24 hours prior to current admission [Z92.82]  Not Applicable   • Smoker [F17.200]  Yes   • Dyslipidemia on statin  [E78.5]  Yes   • Essential hypertension [I10]  Yes      Resolved Hospital Problems   No resolved problems to display.      This patient is new to me today    Hospital Course:  Maria Luz Wade is a 74 y.o. female with chronic ongoing tobacco abuse, atherosclerotic vascular disease, outpatient eval with PVC's planned for [facility] who presented to [facility] ED with acute neurologic deficits and received TPA at that facility. She was transferred to our facility for higher level of care where imaging noted a thrombus in the RT MCA and underwent mechanical thrombectomy with Dr. Carter. She had initially done well then was noted to be more lethargic the following day where CT showed hemorrhagic conversion of her stroke. She has been followed in the ICU without need for surgical intervention of her hemorrhage, passed a FEES and now on a diet, and has been working with PT. She was stepped out of the ICU yesterday for the hospitalist service to discharge the patient. Per the intensivist her sotalol was stopped and she was  started on metoprolol. She has seen cardiology outpatient and is planned for outpatient [facility] after having event monitor showing multiple PVC's and x1 run of nonsustained Vtach. Per intensivist her aspirin has been held. The NSGY note states that she can start eliquis on 3/20/20 and follow up with them in 1 month. Repeat CT of the head today shows unchanged hemorrhage. She is being discharged to [facility] inpatient rehab today with her daughter to provide transportation.    Discharge Follow Up Recommendations for outpatient labs/diagnostics:   PCP 1 week post DC from rehab  Dr. Carter (NS) 1 month    Day of Discharge     HPI:   Weakness doing much better. Tolerating diet. No significant headaches, vision changes, or new neuro changes. Diarrhea improved after restarting her home entocort.    Review of Systems  Gen- No fevers, chills  CV- No chest pain, palpitations  Resp- No cough, dyspnea  GI- No N/V/D, abd pain    Vital Signs:   Temp:  [97.8 °F (36.6 °C)-98.4 °F (36.9 °C)] 98.3 °F (36.8 °C)  Heart Rate:  [] 97  Resp:  [16-18] 18  BP: (117-148)/() 146/87     Physical Exam:  Constitutional: No acute distress, awake, alert  HENT: NCAT, mucous membranes moist  Respiratory: Clear to auscultation bilaterally, respiratory effort normal   Cardiovascular: RRR, no murmurs, rubs, or gallops, palpable radial pulses bilaterally  Gastrointestinal: Positive bowel sounds, soft, nontender, nondistended  Neurologic: Speech clear    Pertinent  and/or Most Recent Results     Results from last 7 days   Lab Units 03/14/20  0514 03/12/20  1442 03/12/20  0329 03/12/20  0328   WBC 10*3/mm3 10.96*  --   --  11.87*   HEMOGLOBIN g/dL 11.9*  --   --  13.9   HEMATOCRIT % 36.0  --   --  44.9   PLATELETS 10*3/mm3 230  --   --  227   SODIUM mmol/L 135*  --  138  --    POTASSIUM mmol/L 3.8 4.2 3.1*  --    CHLORIDE mmol/L 100  --  104  --    CO2 mmol/L 24.0  --  20.0*  --    BUN mg/dL 14  --  8  --    CREATININE mg/dL 0.46*  --  0.46*  --     GLUCOSE mg/dL 127*  --  109*  --    CALCIUM mg/dL 8.8  --  8.8  --      Results from last 7 days   Lab Units 03/14/20  0514   BILIRUBIN mg/dL 0.4   ALK PHOS U/L 69   ALT (SGPT) U/L <5   AST (SGOT) U/L 12     Results from last 7 days   Lab Units 03/12/20  0329   CHOLESTEROL mg/dL 160   TRIGLYCERIDES mg/dL 135   HDL CHOL mg/dL 51     Results from last 7 days   Lab Units 03/12/20  0328   HEMOGLOBIN A1C % 5.60       Brief Urine Lab Results     None          Microbiology Results Abnormal     None          Imaging Results (All)     Procedure Component Value Units Date/Time    CT Head Without Contrast [[id number]] Collected:  03/16/20 0814     Updated:  03/16/20 1356    Narrative:       EXAMINATION: CT HEAD WO CONTRAST-03/16/2020:      INDICATION: Hemorrhagic conversion, CVA; R13.10-Dysphagia, unspecified;  R41.841-Cognitive communication deficit, CVA.     TECHNIQUE: Multiple axial CT imaging was obtained of the head from the  skull base to the skull vertex without the administration of intravenous  contrast.     The radiation dose reduction device was turned on for each scan per the  ALARA (As Low as Reasonably Achievable) protocol.     COMPARISON: 03/13/2020.     FINDINGS: There is a large evolving parenchymal hemorrhage in the right  temporoparietal region. There is no significant change in the  surrounding edema or mass effect. Minimal midline shift to the left  which is stable. The size of the hemorrhage is unchanged. There is  minimal subarachnoid hemorrhage identified in the right posterior  parietal lobe which is also stable. The amount of hemorrhage is slightly  decreasing in the interval. There is minimal effacement identified of  the right lateral ventricle. The left hemisphere is otherwise  unremarkable.       Impression:       Evolving large right temporoparietal hemorrhage with no  significant change seen in the surrounding edema or mass effect. Minimal  midline shift to the left remains. No new findings in  the interval.     D:  03/16/2020  E:  03/16/2020           This report was finalized on 3/16/2020 1:53 PM by Dr. Nadine Berman MD.       CT Head Without Contrast [[id number]] Collected:  03/11/20 1135     Updated:  03/15/20 0037    Narrative:       EXAMINATION: CT CEREBRAL PERFUSION W WO CONTRAST-, CT HEAD WO CONTRAST-   03/11/2020     INDICATION: TIA, initial screening     TECHNIQUE: 5 mm unenhanced images through the brain. Subsequently,  cerebral perfusion analysis was performed using computed tomography with  contrast administration, including postprocessing of parametric mass  with determination of cerebral blood flow, cerebral blood volume and  mean transit time.       The radiation dose reduction device was turned on for each scan per the  ALARA (As Low as Reasonably Achievable) protocol.     COMPARISON: Outside CT scan of 02/05/2015.     FINDINGS: HEAD CT SCAN WITHOUT CONTRAST: The calvarium appears intact.  Included paranasal sinuses and mastoids appear clear. Soft tissue window  images show low attenuation change in the right frontal white matter and  some loss of the gray/white matter interface over an at least 6 cm area.  The patient appears to have some residual intravascular contrast, but  allowing for this, no evidence of hemorrhage is identified. There is no  evidence of infarct elsewhere, no evidence of mass, mass effect,  hydrocephalus or abnormal extra-axial collection.       Impression:       1. Approximately 6 mm area of low attenuation in the right frontal lobe,  consistent with developing MCA territory edema/infarct.       2. Suggestion of residual intravascular contrast from an outside study.  Allowing for this, no evidence of hemorrhage is seen. No evidence of  other acute intracranial disease elsewhere.     CEREBRAL PERFUSION SCAN WITH AND WITHOUT CONTRAST: Rapid analysis shows  the area of developing edema on the unenhanced CT scan to contain an  area of cerebral blood flow less than  30% calculated at 29 mm consistent  with core infarct. Mapping for Tmax greater than six seconds shows a  larger area of potential ischemic penumbra, estimated at 60 ml with  mismatch volume of 30 mL. The individual imaging runs for Tmax, mean  transit time, time to drain and cerebral blood flow all show similar  sized abnormalities, with a significantly smaller central area on  cerebral blood volume, consistent with core infarct. No significant  asymmetry of perfusion is appreciated elsewhere.     IMPRESSION: Anterior right MCA territory infarct, with moderate  surrounding ischemic penumbra, as discussed above. No evidence of  ischemia/infarct elsewhere.     D:  03/11/2020  E:  03/11/2020     This report was finalized on 3/15/2020 12:34 AM by Dr. Zach Dasilva MD.       CT Cerebral Perfusion With & Without Contrast [[id number]] Collected:  03/11/20 1135     Updated:  03/15/20 0037    Narrative:       EXAMINATION: CT CEREBRAL PERFUSION W WO CONTRAST-, CT HEAD WO CONTRAST-   03/11/2020     INDICATION: TIA, initial screening     TECHNIQUE: 5 mm unenhanced images through the brain. Subsequently,  cerebral perfusion analysis was performed using computed tomography with  contrast administration, including postprocessing of parametric mass  with determination of cerebral blood flow, cerebral blood volume and  mean transit time.       The radiation dose reduction device was turned on for each scan per the  ALARA (As Low as Reasonably Achievable) protocol.     COMPARISON: Outside CT scan of 02/05/2015.     FINDINGS: HEAD CT SCAN WITHOUT CONTRAST: The calvarium appears intact.  Included paranasal sinuses and mastoids appear clear. Soft tissue window  images show low attenuation change in the right frontal white matter and  some loss of the gray/white matter interface over an at least 6 cm area.  The patient appears to have some residual intravascular contrast, but  allowing for this, no evidence of hemorrhage is identified.  There is no  evidence of infarct elsewhere, no evidence of mass, mass effect,  hydrocephalus or abnormal extra-axial collection.       Impression:       1. Approximately 6 mm area of low attenuation in the right frontal lobe,  consistent with developing MCA territory edema/infarct.       2. Suggestion of residual intravascular contrast from an outside study.  Allowing for this, no evidence of hemorrhage is seen. No evidence of  other acute intracranial disease elsewhere.     CEREBRAL PERFUSION SCAN WITH AND WITHOUT CONTRAST: Rapid analysis shows  the area of developing edema on the unenhanced CT scan to contain an  area of cerebral blood flow less than 30% calculated at 29 mm consistent  with core infarct. Mapping for Tmax greater than six seconds shows a  larger area of potential ischemic penumbra, estimated at 60 ml with  mismatch volume of 30 mL. The individual imaging runs for Tmax, mean  transit time, time to drain and cerebral blood flow all show similar  sized abnormalities, with a significantly smaller central area on  cerebral blood volume, consistent with core infarct. No significant  asymmetry of perfusion is appreciated elsewhere.     IMPRESSION: Anterior right MCA territory infarct, with moderate  surrounding ischemic penumbra, as discussed above. No evidence of  ischemia/infarct elsewhere.     D:  03/11/2020  E:  03/11/2020     This report was finalized on 3/15/2020 12:34 AM by Dr. Zach Dasilva MD.       SLP FEES - Fiberoptic Endo Eval Swallow [[id number]] Resulted:  03/13/20 1427     Updated:  03/13/20 1427    Narrative:       This procedure was auto-finalized with no dictation required.    CT Head Without Contrast [[id number]] Collected:  03/13/20 0553     Updated:  03/13/20 0555    Narrative:       CT Head WO    HISTORY:   74-year-old female with right MCA territory stroke. Status post TPA 3/11/2020 and known hemorrhagic conversion.    TECHNIQUE:   Axial unenhanced head CT. Radiation dose reduction  techniques included automated exposure control or exposure modulation based on body size. Count of known CT and cardiac nuc med studies performed in previous 12 months: 3.     Time of scan: 0522 hours    COMPARISON:   3/12/2020 0831 hours    FINDINGS:   The patient has a known right MCA territory infarct with evolving hemorrhagic transformation. The largest components of hemorrhage within the evolving infarct and surrounding area of vasogenic edema measure up to about 5.0 x 3.6 x 4.1 cm. There is  accompanying subarachnoid hemorrhage approaching the vertex, stable to less conspicuous than on the prior study. There is generalized edema throughout the right cerebral hemisphere. Compared to the prior examination, interval subtle worsening of mass  effect and midline shift from right to left of about 3 mm. Subtle but increasing effacement of the right lateral ventricle. The fourth ventricle and third ventricle remain patent.    No distinct evidence of intraventricular extension of hemorrhage. The globes are intact. Bones are intact. Sinuses are clear.    Estimated volume of intracranial hemorrhage: 36 mL      Impression:         1. Right MCA territory infarct with hemorrhagic transformation. Interval subtle worsening of mass effect with midline shift from right to left measuring about 3 mm. Interval subtle worsening of mass effect and effacement of the right lateral ventricle.  Persistent edema throughout the right cerebral hemisphere with stable to interval decrease in overall volume of subarachnoid blood.  2. No new evidence of intraventricular extension of hemorrhage..      NOTIFICATION: Critical Value/emergent results were called by telephone at the time of interpretation on 3/13/2020 5:48 AM to RICKIE Aranda who verbally acknowledged these results.    Signer Name: Aleksandr Marquez MD   Signed: 3/13/2020 5:53 AM   Workstation Name: [facility]    [facility]    CT Head Without Contrast  [[id number]] Collected:  03/12/20 0834     Updated:  03/12/20 1501    Narrative:       EXAMINATION: CT HEAD WO CONTRAST- 03/12/2020     INDICATION: Stroke      TECHNIQUE: CT head without intravenous contrast     The radiation dose reduction device was turned on for each scan per the  ALARA (As Low as Reasonably Achievable) protocol.     COMPARISON: CT head dated 03/11/2020, CT cerebral perfusion 03/11/2020  at 1117 hours     FINDINGS: Interval development of a right frontotemporal  intraparenchymal hemorrhage measuring maximum transaxial diameter 5.0 x  3.6 cm with surrounding vasogenic edema and effacement of the right  lateral ventricle without midline shift. Trace subarachnoid hemorrhage  adjacent on the anterior margin without distinct intraventricular  hemorrhage identified. Globes and orbits unremarkable. Visualized  paranasal sinuses and mastoid air cells are grossly clear and  well-pneumatized.     ICH Volume is >30 ml: No (approximately 21 mL)     Intraventricular Hemorrhage: No     Infratentorial Origin of Hemorrhage: No       Impression:       Interval development of a right frontotemporal  intraparenchymal hemorrhage with surrounding scattered subarachnoid  hemorrhage and vasogenic edema producing effacement of the right lateral  ventricle without midline shift.     ICH Volume is >30 ml: No (approximately 21 mL)     Intraventricular Hemorrhage: No     Infratentorial Origin of Hemorrhage: No     Findings were relayed to Dr. Carter via phone on 03/12/2020 at 0835  hours.     D:  03/12/2020  E:  03/12/2020     This report was finalized on 3/12/2020 2:58 PM by Dr. Gerald Mtz.       XR Chest 1 View [[id number]] Collected:  03/12/20 0823     Updated:  03/12/20 1328    Narrative:       EXAMINATION: XR CHEST 1 VW- 03/11/2020     INDICATION: COPD     COMPARISON: NONE     FINDINGS: Cardiac size within normal limits. Hyperinflated appearance of  chronic obstructive pulmonary disease without focal consolidation.  No  pneumothorax or pleural effusion. Degenerative changes of the spine.           Impression:       Hyperinflated appearance of chronic obstructive pulmonary  disease without focal consolidation. No pneumothorax or pleural  effusion.      D:  03/12/2020  E:  03/12/2020     This report was finalized on 3/12/2020 1:25 PM by Dr. Gerald Mtz.             Results for orders placed during the hospital encounter of 03/11/20   Bilateral Carotid Duplex    Narrative · Right internal carotid artery stenosis of 0-49%.  · Left internal carotid artery stenosis of 0-49%.  · There is antegrade flow in the vertebral arteries bilaterally.          Results for orders placed during the hospital encounter of 03/11/20   Bilateral Carotid Duplex    Narrative · Right internal carotid artery stenosis of 0-49%.  · Left internal carotid artery stenosis of 0-49%.  · There is antegrade flow in the vertebral arteries bilaterally.          Results for orders placed during the hospital encounter of 03/11/20   Adult Transthoracic Echo Complete W/ Cont if Necessary Per Protocol (With Agitated Saline)    Narrative · Left ventricular systolic function is hyperdynamic (EF > 70).  · Left ventricular diastolic dysfunction (grade I a) consistent with   impaired relaxation.  · Mild aortic valve regurgitation is present.  · Mild mitral valve regurgitation is present  · Mild tricuspid valve regurgitation is present.  · Saline test results are negative.          Discharge Details        Discharge Medications      New Medications      Instructions Start Date   acetaminophen 325 MG tablet  Commonly known as:  TYLENOL   650 mg, Oral, Every 4 Hours PRN      metoprolol tartrate 25 MG tablet  Commonly known as:  LOPRESSOR   25 mg, Oral, Every 12 Hours Scheduled      nicotine 21 MG/24HR patch  Commonly known as:  NICODERM CQ   1 patch, Transdermal, Every 24 Hours Scheduled   Start Date:  March 17, 2020        Changes to Medications      Instructions Start Date    atorvastatin 80 MG tablet  Commonly known as:  LIPITOR  What changed:    · medication strength  · how much to take  · how to take this  · when to take this  · additional instructions   80 mg, Oral, Nightly         Continue These Medications      Instructions Start Date   albuterol sulfate  (90 Base) MCG/ACT inhaler  Commonly known as:  PROVENTIL HFA;VENTOLIN HFA;PROAIR HFA   2 puffs, Inhalation, Every 4 Hours PRN      amLODIPine 5 MG tablet  Commonly known as:  NORVASC   5 mg, Oral, 2 Times Daily      Budesonide 3 MG 24 hr capsule  Commonly known as:  ENTOCORT EC   3 mg, Oral, Every Morning      busPIRone 10 MG tablet  Commonly known as:  BUSPAR   10 mg, Oral, 3 Times Daily PRN      DULoxetine 60 MG capsule  Commonly known as:  CYMBALTA   60 mg, Oral, 2 Times Daily      glucosamine-chondroitin 500-400 MG capsule capsule   Oral, 2 Times Daily With Meals      loratadine 10 MG tablet  Commonly known as:  CLARITIN   10 mg, Oral, Daily      pantoprazole 40 MG EC tablet  Commonly known as:  PROTONIX   40 mg, Oral, Daily      sertraline 25 MG tablet  Commonly known as:  ZOLOFT   25 mg, Oral, Daily      vitamin B-12 1000 MCG tablet  Commonly known as:  CYANOCOBALAMIN   2,000 mcg, Oral, Daily      Vitamin D 125 MCG (5000 UT) capsule capsule   5,000 Units, Oral, Daily         Stop These Medications    aspirin 81 MG EC tablet     celecoxib 100 MG capsule  Commonly known as:  CeleBREX     Sotalol HCl AF 80 MG tablet            No Known Allergies      Discharge Disposition:  Rehab Facility or Unit (DC - External)    Diet:  Hospital:  Diet Order   Procedures   • Diet Dysphagia; IV - Mechanical Soft No Mixed Consistencies; Nectar / Syrup Thick; Extra Sauce / Gravy; Cardiac          CODE STATUS:    Code Status and Medical Interventions:   Ordered at: 03/11/20 1119     Level Of Support Discussed With:    Patient     Code Status:    CPR     Medical Interventions (Level of Support Prior to Arrest):    Full       Future  Appointments   Date Time Provider Department [place]   8/25/2020  1:30 PM Alexa Cody APRN [facility] None       Additional Instructions for the Follow-ups that You Need to Schedule     Discharge Follow-up with PCP   As directed       Currently Documented PCP:    Sameera Davenport APRN    PCP Phone Number:    [phone]     Follow Up Details:  1 week post DC from rehab         Discharge Follow-up with Specified Provider: Dr. Raul MCNALLY; 1 Month   As directed      To:  Dr. Raul MCNALLY    Follow Up:  1 Month    Follow Up Details:  post thrombectomy, hemorrhagic conversion               Time Spent on Discharge:  35 minutes    Electronically signed by Justice Napier DO, 03/16/20, 3:30 PM.

## 2020-03-16 NOTE — THERAPY TREATMENT NOTE
Acute Care - Occupational Therapy Treatment Note  [facility]     Patient Name: Maria Luz Wade  : 1945  MRN: [id number]  Today's Date: 3/16/2020             Admit Date: 3/11/2020       ICD-10-CM ICD-9-CM   1. Dysphagia, unspecified type R13.10 787.20   2. Cognitive communication deficit R41.841 799.52   3. Cerebrovascular accident (CVA) due to embolism of right anterior cerebral artery (CMS/HCC) I63.421 434.11   4. Dysarthria R47.1 784.51     Patient Active Problem List   Diagnosis   • Dizziness   • PVC (premature ventricular contraction)   • Smoker   • Palpitations   • Dyslipidemia on statin    • Essential hypertension   • R MCA AIS s/p tPA and thrombectomy    • Tissue plasminogen activator (tPA) administered at other facility within 24 hours prior to current admission   • Intracranial bleed (CMS/[facility]); hemorrhagic conversion of an ischemic stroke     Past Medical History:   Diagnosis Date   • History of tonsillectomy    • History of tubal ligation    • Hyperlipidemia      Past Surgical History:   Procedure Laterality Date   • CARDIOVASCULAR STRESS TEST  2017    L.Myoview- R/O Anterior Ischemia.   • CONVERTED (HISTORICAL) HOLTER  2020    AVG 77. . 6.8% PVC. one 6 beats run of V-Tach   • ECHO - CONVERTED  2017    EF 65%. Mild-Mod MR   • INTERVENTIONAL RADIOLOGY PROCEDURE Bilateral 3/11/2020    Procedure: CAROTID CEREBRAL ANGIOGRAM BILATERAL;  Surgeon: Adryan Carter MD;  Location: [facility] INVASIVE LOCATION;  Service: Interventional Radiology;  Laterality: Bilateral;   • OTHER SURGICAL HISTORY      CT Scan- 60% (L) ICA. 50% (R) ICA   • US CAROTID UNILATERAL  2017    @ [facility]. No sig stenosis       Therapy Treatment    Rehabilitation Treatment Summary     Row Name 20 1403             Treatment Time/Intention    Discipline  occupational therapist  -HK      Patient/Family Observations  Pt received upright in chair with daughter at bedside.   -HK       Care Plan Review  risks/benefits reviewed;care plan/treatment goals reviewed;patient/other agree to care plan  -HK      Care Plan Review, Other Participant(s)  daughter  -HK      Existing Precautions/Restrictions  fall;other (see comments) L side fieldcut and weakness   -HK      Recorded by [HK] Aysha Orlando, OT 03/16/20 1530      Row Name 03/16/20 1402             Vital Signs    Pre Systolic BP Rehab  -- RN Cleared for tx; VSS   -HK      Pre Patient Position  Sitting  -HK      Intra Patient Position  Standing  -HK      Post Patient Position  Sitting  -HK      Recorded by [HK] Aysha Orlando, OT 03/16/20 1530      Row Name 03/16/20 1403             Cognitive Assessment/Intervention    Additional Documentation  Cognitive Assessment/Intervention (Group)  -HK      Recorded by [HK] Aysha Orlando, OT 03/16/20 1530      Row Name 03/16/20 1406             Cognitive Assessment/Intervention- PT/OT    Affect/Mental Status (Cognitive)  WFL  -HK      Orientation Status (Cognition)  oriented x 4  -HK      Follows Commands (Cognition)  follows one step commands;over 90% accuracy  -HK      Cognitive Function (Cognitive)  safety deficit  -HK      Safety Deficit (Cognitive)  mild deficit;impulsivity;safety precautions follow-through/compliance;safety precautions awareness;judgment  -HK      Recorded by [HK] Aysha Orlando, OT 03/16/20 1530      Row Name 03/16/20 1406             Safety Issues, Functional Mobility    Safety Issues Affecting Function (Mobility)  safety precautions follow-through/compliance;sequencing abilities;safety precaution awareness;judgment;insight into deficits/self awareness;awareness of need for assistance  -HK      Impairments Affecting Function (Mobility)  balance;endurance/activity tolerance;pain;strength  -HK      Recorded by [HK] Aysha Orlando, OT 03/16/20 1530      Row Name 03/16/20 1405             Bed Mobility Assessment/Treatment    Comment (Bed Mobility)  Pt received and left [facility].   -HK      Recorded  by [HK] Aysha Orlando, OT 03/16/20 1530      Row Name 03/16/20 1403             Functional Mobility    Functional Mobility- Ind. Level  not tested  -HK      Recorded by [HK] Aysha Orlando, OT 03/16/20 1530      Row Name 03/16/20 1403             Transfer Assessment/Treatment    Transfer Assessment/Treatment  sit-stand transfer;stand-sit transfer  -HK      Comment (Transfers)  verbal cues for safe hand placement and sequencnig.   -HK      Recorded by [HK] Aysha Orlando, OT 03/16/20 1530      Row Name 03/16/20 1403             Sit-Stand Transfer    Sit-Stand Maxwell (Transfers)  minimum assist (75% patient effort);verbal cues  -HK      Assistive Device (Sit-Stand Transfers)  walker, front-wheeled  -HK      Recorded by [HK] Aysha Orlando, OT 03/16/20 1530      Row Name 03/16/20 1403             Stand-Sit Transfer    Stand-Sit Maxwell (Transfers)  minimum assist (75% patient effort);verbal cues  -HK      Assistive Device (Stand-Sit Transfers)  walker, front-wheeled  -HK      Recorded by [HK] Aysha Orlando, OT 03/16/20 1530      Row Name 03/16/20 1403             ADL Assessment/Intervention    BADL Assessment/Intervention  lower body dressing;upper body dressing;bathing  -HK      Recorded by [HK] Aysha Orlando, OT 03/16/20 1530      Row Name 03/16/20 1403             Bathing Assessment/Intervention    Bathing Maxwell Level  upper extremities;perineal area;minimum assist (75% patient effort);chest/trunk;proximal lower extremities;supervision;distal lower extremities/feet;maximum assist (25% patient effort)  -HK      Bathing Position  unsupported sitting  -HK      Comment (Bathing)  Pt completed bathing with Penelope from OT to wash back and fabrice areas. Pt required maxA to wash feet.   -HK      Recorded by [HK] Aysha Orlando, OT 03/16/20 1530      Row Name 03/16/20 1403             Upper Body Dressing Assessment/Training    Upper Body Dressing Maxwell Level  don;pull-over garment;minimum assist (75% patient  effort);verbal cues  -HK      Upper Body Dressing Position  unsupported sitting  -HK      Comment (Upper Body Dressing)  Pt with difficulty seperating fabric of shirt to get arms in. Pt educated on janie dressing technique.   -HK      Recorded by [HK] Aysha Orlando, OT 03/16/20 1530      Row Name 03/16/20 1403             Lower Body Dressing Assessment/Training    Lower Body Dressing He[place] Level  don;pants/bottoms;minimum assist (75% patient effort);socks;moderate assist (50% patient effort)  -HK      Lower Body Dressing Position  unsupported sitting  -HK      Comment (Lower Body Dressing)  Pt educated on janie dressing technique. Pt requires Penelope to don pants/underwear. Pt able to dof socks but required maxA to don.   -HK      Recorded by [HK] Aysha Orlando, OT 03/16/20 1530      Row Name 03/16/20 1403             BADL Safety/Performance    Impairments, BADL Safety/Performance  endurance/activity tolerance;balance;strength;range of motion;sensory awareness;motor control;coordination  -HK      Skilled BADL Treatment/Intervention  BADL process/adaptation training  -HK      Recorded by [HK] Aysha Orlando, OT 03/16/20 1530      Row Name 03/16/20 1403             Motor Skills Assessment/Interventions    Additional Documentation  Balance (Group);Therapeutic Exercise (Group);Therapeutic Exercise Interventions (Group)  -HK      Recorded by [HK] Aysha Orlando, OT 03/16/20 1530      Row Name 03/16/20 1403             Therapeutic Exercise    86909 - OT Therapeutic Exercise Minutes  8  -HK      Recorded by [HK] Aysha Orlando, OT 03/16/20 1530      Row Name 03/16/20 1403             Therapeutic Exercise    Comment (Therapeutic Exercise)  OT issued foam blocks, theraputty, and HEP and educated pt and daughter on completion.   -HK      Recorded by [] Aysha Orlando, OT 03/16/20 1530      Row Name 03/16/20 1403             Balance    Balance  static sitting balance;static standing balance;dynamic sitting balance;dynamic  standing balance  -HK      Recorded by [HK] Aysha Orlando, OT 03/16/20 1530      Row Name 03/16/20 1403             Static Sitting Balance    Level of Bent (Unsupported Sitting, Static Balance)  independent  -HK      Sitting Position (Unsupported Sitting, Static Balance)  sitting in chair  -HK      Time Able to Maintain Position (Unsupported Sitting, Static Balance)  3 to 4 minutes  -HK      Recorded by [HK] Aysha Orlando, OT 03/16/20 1530      Row Name 03/16/20 1403             Dynamic Sitting Balance    Level of Bent, Reaches Outside Midline (Sitting, Dynamic Balance)  contact guard assist  -HK      Sitting Position, Reaches Outside Midline (Sitting, Dynamic Balance)  sitting in chair  -HK      Comment, Reaches Outside Midline (Sitting, Dynamic Balance)  completing LBD   -HK      Recorded by [HK] Aysha Orlando, OT 03/16/20 1530      Row Name 03/16/20 1403             Static Standing Balance    Level of Bent (Supported Standing, Static Balance)  contact guard assist  -HK      Time Able to Maintain Position (Supported Standing, Static Balance)  1 to 2 minutes  -HK      Assistive Device Utilized (Supported Standing, Static Balance)  walker, rolling  -HK      Recorded by [HK] Aysha Orlando, OT 03/16/20 1530      Row Name 03/16/20 1403             Dynamic Standing Balance    Level of Bent, Reaches Outside Midline (Standing, Dynamic Balance)  minimal assist, 75% patient effort  -HK      Time Able to Maintain Position, Reaches Outside Midline (Standing, Dynamic Balance)  1 to 2 minutes  -HK      Assistive Device Utilized (Supported Standing, Dynamic Balance)  walker, rolling  -HK      Recorded by [HK] Aysha Orlando, OT 03/16/20 1530      Row Name 03/16/20 1403             Positioning and Restraints    Pre-Treatment Position  sitting in chair/recliner  -HK      Post Treatment Position  chair  -HK      In Chair  notified nsg;reclined;call light within reach;encouraged to call for assist;with  family/caregiver  -HK      Recorded by [] Aysha Orlando, OT 03/16/20 1530      Row Name 03/16/20 1403             Pain Assessment    Additional Documentation  Pain Scale: FACES Pre/Post-Treatment (Group)  -HK      Recorded by [] Aysha Orlando, OT 03/16/20 1530      Row Name 03/16/20 1406             Pain Scale: FACES Pre/Post-Treatment    Pain: FACES Scale, Pretreatment  0-->no hurt  -HK      Pain: FACES Scale, Post-Treatment  0-->no hurt  -HK      Recorded by [] Aysha Orlando, OT 03/16/20 1530      Row Name 03/16/20 1403             Coping    Observed Emotional State  accepting;calm;cooperative  -HK      Recorded by [] Aysha Orlando, OT 03/16/20 1530      Row Name 03/16/20 1403             Plan of Care Review    Plan of Care Reviewed With  patient;daughter  -HK      Progress  improving  -HK      Recorded by [] Aysha Orlando, OT 03/16/20 1530      Row Name 03/16/20 1403             Outcome Summary/Treatment Plan (OT)    Daily Summary of Progress (OT)  progress toward functional goals as expected  -HK      Recorded by [] Aysha Orlando, OT 03/16/20 1532        User Key  (r) = Recorded By, (t) = Taken By, (c) = Cosigned By    Initials Name Effective Dates Discipline     Aysha Orlando, OT 03/07/18 -  OT           Rehab Goal Summary     Row Name 03/16/20 0945 03/16/20 0840          Bed Mobility Goal 1 (PT)    Activity/Assistive Device (Bed Mobility Goal 1, PT)  --  sit to supine;supine to sit  -EN     [place] Level/Cues Needed (Bed Mobility Goal 1, PT)  --  conditional independence  -EN     Time Frame (Bed Mobility Goal 1, PT)  --  long term goal (LTG);5 days  -EN     Progress/Outcomes (Bed Mobility Goal 1, PT)  --  goal revised this date;goal ongoing  -EN        Transfer Goal 1 (PT)    Activity/Assistive Device (Transfer Goal 1, PT)  --  sit-to-stand/stand-to-sit;bed-to-chair/chair-to-bed  -EN     [place] Level/Cues Needed (Transfer Goal 1, PT)  --  conditional independence  -EN     Time Frame  (Transfer Goal 1, PT)  --  long term goal (LTG);5 days  -EN     Progress/Outcome (Transfer Goal 1, PT)  --  goal ongoing  -EN        Gait Training Goal 1 (PT)    Activity/Assistive Device (Gait Training Goal 1, PT)  --  gait (walking locomotion)  -EN     [place] Level (Gait Training Goal 1, PT)  --  contact guard assist  -EN     Distance (Gait Goal 1, PT)  --  300 feet  -EN     Time Frame (Gait Training Goal 1, PT)  --  2 weeks  -EN     Progress/Outcome (Gait Training Goal 1, PT)  --  goal ongoing  -EN        Swallow Goals (SLP)    Oral Nutrition/Hydration Goal Selection (SLP)  oral nutrition/hydration, SLP goal (free text)  -AC  --        Oral Nutrition/Hydration Goal 1 (SLP)    Oral Nutrition/Hydration Goal 1, SLP  LTG: Pt will return to regular diet, thin liquids w/o aspiration or difficulty.  -AC  --     Time Frame (Oral Nutrition/Hydration Goal 1, SLP)  short term goal (STG)  -AC  --     Progress/Outcomes (Oral Nutrition/Hydration Goal 1, SLP)  goal ongoing  -AC  --        Oral Nutrition/Hydration Goal 2 (SLP)    Oral Nutrition/Hydration Goal 2, SLP  Pt will demonstrate adequate mastication of solid w/o oral pocketing/residue w/ 100% acc w/o cues in order to determine readiness for diet upgrade.  -AC  --     Time Frame (Oral Nutrition/Hydration Goal 2, SLP)  short term goal (STG)  -AC  --     Progress/Outcomes (Oral Nutrition/Hydration Goal 2, SLP)  goal ongoing  -AC  --        Oral Nutrition/Hydration Goal (SLP)    Oral Nutrition/Hydration Goal, SLP  Pt will tolerate trials of nectar-thick liquid and soft solids w/o s/sxs aspiration or complication w/ 100% acc w/o cues.  -AC  --     Time Frame (Oral Nutrition/Hydration Goal, SLP)  short term goal (STG)  -AC  --        Labial Strengthening Goal 1 (SLP)    Activity (Labial Strengthening Goal 1, SLP)  increase labial tone  -AC  --     Increase Labial Tone  labial resistance exercises  -AC  --     [place]/Accuracy (Labial Strengthening Goal 1, SLP)  with  minimal cues (75-90% accuracy)  -AC  --     Time Frame (Labial Strengthening Goal 1, SLP)  short term goal (STG)  -AC  --        Lingual Strengthening Goal 1 (SLP)    Activity (Lingual Strengthening Goal 1, SLP)  increase tongue back strength  -AC  --     Increase Lingual Tone/Sensation/Control/Coordination/Movement  swallow trials;lingual resistance exercises  -AC  --     Increase Tongue Back Strength  lingual resistance exercises  -AC  --     [place]/Accuracy (Lingual Strengthening Goal 1, SLP)  with minimal cues (75-90% accuracy)  -AC  --     Time Frame (Lingual Strengthening Goal 1, SLP)  short term goal (STG)  -AC  --     Progress/Outcomes (Lingual Strengthening Goal 1, SLP)  goal ongoing  -AC  --        Swallow Compensatory Strategies Goal 1 (SLP)    Activity (Swallow Compensatory Strategies/Techniques Goal 1, SLP)  compensatory strategies;food/liquid placed on stronger right side;alternate food/liquid intake lingual sweep  -AC  --     [place]/Accuracy (Swallow Compensatory Strategies/Techniques Goal 1, SLP)  with minimal cues (75-90% accuracy)  -AC  --     Time Frame (Swallow Compensatory Strategies/Techniques Goal 1, SLP)  short term goal (STG)  -AC  --     Progress/Outcomes (Swallow Compensatory Strategies/Techniques Goal 1, SLP)  goal ongoing  -AC  --        Communication Treatment Objective and Progress Goals (SLP)    Motor Speech/Dysarthria Treatment Objectives  Motor Speech/Dysarthria Treatment Objectives (Group)  -AC  --     Cognitive Linguistic Treatment Objectives  Cognitive Linguistic Treatment Objectives (Group)  -AC  --     Additional Goals  Additional Goals (Group)  -AC  --        Motor Speech/Dysarthria Treatment Objectives    Articulation Selection  articulation, SLP goal 1  -AC  --        Articulation Goal 1 (SLP)    Improve Articulation Goal 1 (SLP)  by over-articulating at phrase level;90%;with minimal cues (75-90%)  -AC  --     Time Frame (Articulation Goal 1, SLP)  short term  goal (STG)  -AC  --        Cognitive Linguistic Treatment Objectives    Memory Skills Selection  memory skills, SLP goal 1  -AC  --     Problem Solving Selection  problem solving, SLP goal 1  -AC  --     Executive Function Skills Selection  executive function skills, SLP goal 1  -AC  --        Memory Skills Goal 1 (SLP)    Improve Memory Skills Through Goal 1 (SLP)  recalling unrelated word lists with an imposed delay;use memory strategies;90%;with minimal cues (75-90%)  -AC  --     Time Frame (Memory Skills Goal 1, SLP)  short term goal (STG)  -AC  --        Functional Problem Solving Skills Goal 1 (SLP)    Improve Problem Solving Through Goal 1 (SLP)  determine solutions to simple ADL/safety problems;complete organization/home management task;80%;with minimal cues (75-90%)  -AC  --     Time Frame (Problem Solving Goal 1, SLP)  short term goal (STG)  -AC  --        Executive Functional Skills Goal 1 (SLP)    Improve Executive Function Skills Goal 1 (SLP)  demonstrate awareness of deficit;identify strategies, strengths, limitations;identify anticipated needs;perform self-correction;perform self-evaluation;80%;with minimal cues (75-90%)  -AC  --     Time Frame (Executive Function Skills Goal 1, SLP)  short term goal (STG)  -AC  --        Additional Goals    Additional Goal Selection  additional goal, SLP goal 1  -AC  --        Additional Goal 1 (SLP)    Additional Goal 1, SLP  LTG: Pt will improve cognitive-communication skills in order to participate in care in hospital setting w/ 100% acc w/o cues.  -AC  --     Time Frame (Additional Goal 1, SLP)  by discharge  -AC  --       User Key  (r) = Recorded By, (t) = Taken By, (c) = Cosigned By    Initials Name Provider Type Discipline    Padmini Millan, MS CCC-SLP Speech and Language Pathologist SLP    Oli Lerma, PT Physical Therapist PT            OT Recommendation and Plan  Outcome Summary/Treatment Plan (OT)  Daily Summary of Progress (OT): progress toward  functional goals as expected  Daily Summary of Progress (OT): progress toward functional goals as expected  Plan of Care Review  Plan of Care Reviewed With: patient, daughter  Plan of Care Reviewed With: patient, daughter  Outcome Summary: Pt continues to improve transfers. Pt completes sit to stand with Penelope . Pt required maxA to wash feet and Penelope to wash back and fabrice areas. Pt able to compelte all UBB with setup. Pt don pants/underwear with Penelope and required maxA to don socks. Pt demonstrates decreased L FMC. OT issued foam block and theraputty as well as hand out for HEP. Recommend d/c to IPR when medically ready.  Outcome Measures     Row Name 03/16/20 1403             How much help from another is currently needed...    Putting on and taking off regular lower body clothing?  3  -HK      Bathing (including washing, rinsing, and drying)  2  -HK      Toileting (which includes using toilet bed pan or urinal)  3  -HK      Putting on and taking off regular upper body clothing  2  -HK      Taking care of personal grooming (such as brushing teeth)  3  -HK      Eating meals  3  -HK      AM-PAC 6 Clicks Score (OT)  16  -HK         Modified [place] Scale    Modified [place] Scale  3 - Moderate disability.  Requiring some help, but able to walk without assistance.  -HK         Functional Assessment    Outcome Measure Options  AM-PAC 6 Clicks Daily Activity (OT)  -        User Key  (r) = Recorded By, (t) = Taken By, (c) = Cosigned By    Initials Name Provider Type    HK Aysha Orlando, OT Occupational Therapist           Time Calculation:   Time Calculation- OT     Row Name 03/16/20 1403 03/16/20 0840          Time Calculation- OT    OT Start Time  1403  -HK  --     OT Received On  03/16/20  -HK  --        Timed Charges    76434 - OT Therapeutic Exercise Minutes  8  -HK  --     70172 - Gait Training Minutes   --  23  -EN     54325 - OT Self Care/Mgmt Minutes  38  -HK  --       User Key  (r) = Recorded By, (t) = Taken By,  (c) = Cosigned By    Initials Name Provider Type    HK Aysha Orlando, OT Occupational Therapist    EN Oli Peterson, PT Physical Therapist        Therapy Charges for Today     Code Description Service Date Service Provider Modifiers Qty    21576473109 HC OT SELF CARE/MGMT/TRAIN EA 15 MIN 3/16/2020 Aysha Orlando, OT GO 2    27875080140 HC OT THER PROC EA 15 MIN 3/16/2020 Aysha Orlando, OT GO 1               Aysha Orlando OT  3/16/2020

## 2020-03-16 NOTE — THERAPY TREATMENT NOTE
Patient Name: Maria Luz Wade  : 1945    MRN: [id number]                              Today's Date: 3/16/2020       Admit Date: 3/11/2020    Visit Dx:     ICD-10-CM ICD-9-CM   1. Dysphagia, unspecified type R13.10 787.20   2. Cognitive communication deficit R41.841 799.52   3. Cerebrovascular accident (CVA) due to embolism of right anterior cerebral artery (CMS/HCC) I63.421 434.11   4. Dysarthria R47.1 784.51     Patient Active Problem List   Diagnosis   • Dizziness   • PVC (premature ventricular contraction)   • Smoker   • Palpitations   • Dyslipidemia on statin    • Essential hypertension   • R MCA AIS s/p tPA and thrombectomy    • Tissue plasminogen activator (tPA) administered at other facility within 24 hours prior to current admission   • Intracranial bleed (CMS/[facility]); hemorrhagic conversion of an ischemic stroke     Past Medical History:   Diagnosis Date   • History of tonsillectomy    • History of tubal ligation    • Hyperlipidemia      Past Surgical History:   Procedure Laterality Date   • CARDIOVASCULAR STRESS TEST  2017    L.Myoview- R/O Anterior Ischemia.   • CONVERTED (HISTORICAL) HOLTER  2020    AVG 77. . 6.8% PVC. one 6 beats run of V-Tach   • ECHO - CONVERTED  2017    EF 65%. Mild-Mod MR   • INTERVENTIONAL RADIOLOGY PROCEDURE Bilateral 3/11/2020    Procedure: CAROTID CEREBRAL ANGIOGRAM BILATERAL;  Surgeon: Adryan Carter MD;  Location: [facility] INVASIVE LOCATION;  Service: Interventional Radiology;  Laterality: Bilateral;   • OTHER SURGICAL HISTORY      CT Scan- 60% (L) ICA. 50% (R) ICA   • US CAROTID UNILATERAL  2017    @ [facility]. No sig stenosis     General Information     Row Name 20 0840          PT Evaluation Time/Intention    Document Type  therapy note (daily note)  -EN     Mode of Treatment  physical therapy;individual therapy  -EN     Row Name 2040          General Information    Patient Profile Reviewed?  yes  -EN      Existing Precautions/Restrictions  fall L sided weakness and neglect  -     Row Name 03/16/20 0840          Cognitive Assessment/Intervention- PT/OT    Orientation Status (Cognition)  oriented x 3  -EN     Row Name 03/16/20 0840          Safety Issues, Functional Mobility    Safety Issues Affecting Function (Mobility)  awareness of need for assistance;safety precautions follow-through/compliance;insight into deficits/self awareness;safety precaution awareness  -EN     Impairments Affecting Function (Mobility)  balance;cognition;coordination;endurance/activity tolerance;grasp;motor control;motor planning;strength;sensation/sensory awareness;postural/trunk control  -       User Key  (r) = Recorded By, (t) = Taken By, (c) = Cosigned By    Initials Name Provider Type    EN Oli Peterson, PT Physical Therapist        Mobility     Row Name 03/16/20 0840          Bed Mobility Assessment/Treatment    Bed Mobility Assessment/Treatment  scooting/bridging;supine-sit  -EN     Scooting/Bridging Story (Bed Mobility)  verbal cues;contact guard  -     Supine-Sit Story (Bed Mobility)  verbal cues;contact guard  -     Assistive Device (Bed Mobility)  bed rails;head of bed elevated  -     Comment (Bed Mobility)  Verbal cues for LE sequencing off of EOB and use of UEs to push trunk into stting  -     Row Name 03/16/20 0840          Transfer Assessment/Treatment    Comment (Transfers)  Verbal cues for safe hand placement during standing/sitting  -     Row Name 03/16/20 0840          Sit-Stand Transfer    Sit-Stand Story (Transfers)  contact guard;verbal cues  -     Assistive Device (Sit-Stand Transfers)  walker, front-wheeled  -     Row Name 03/16/20 0840          Gait/Stairs Assessment/Training    Gait/Stairs Assessment/Training  gait/ambulation assistive device;gait/ambulation independence  -     Story Level (Gait)  verbal cues;moderate assist (50% patient effort)  -     Assistive Device  (Gait)  walker, front-wheeled  -EN     Distance in Feet (Gait)  130 feet  -EN     Pattern (Gait)  step-through  -EN     Deviations/Abnormal Patterns (Gait)  base of support, narrow;gait speed decreased;shad decreased;stride length decreased;bilateral deviations  -EN     Bilateral Gait Deviations  forward flexed posture;heel strike decreased  -EN     Left Sided Gait Deviations  weight shift ability decreased;leans left  -EN     [place] Level (Stairs)  not tested  -EN     Comment (Gait/Stairs)  Pt ambulated with step through pattern and decreased speed. Pt with significant lean towards L, requiring mod A for correction. Pt with poor L hand  on RW, requiring mod A. Verbal cues for maintaining upright posture, increase step length, and AD management. Gait limited by fatigue and weakness.   -EN       User Key  (r) = Recorded By, (t) = Taken By, (c) = Cosigned By    Initials Name Provider Type    Oli Lerma, PT Physical Therapist        Obj/Interventions     Row Name 03/16/20 0840          Static Sitting Balance    Level of [place] (Unsupported Sitting, Static Balance)  supervision  -EN     Sitting Position (Unsupported Sitting, Static Balance)  sitting in chair  -EN     Time Able to Maintain Position (Unsupported Sitting, Static Balance)  more than 5 minutes  -EN     Row Name 03/16/20 0840          Static Standing Balance    Level of [place] (Supported Standing, Static Balance)  moderate assist, 50 to 74% patient effort  -EN     Time Able to Maintain Position (Supported Standing, Static Balance)  1 to 2 minutes  -EN     Assistive Device Utilized (Supported Standing, Static Balance)  walker, rolling  -EN     Comment (Supported Standing, Static Balance)  L lean  -EN     Row Name 03/16/20 0840          Dynamic Standing Balance    Level of [place], Reaches Outside Midline (Standing, Dynamic Balance)  moderate assist, 50 to 74% patient effort  -EN     Time Able to Maintain Position, Reaches  Outside Midline (Standing, Dynamic Balance)  more than 5 minutes  -EN     Assistive Device Utilized (Supported Standing, Dynamic Balance)  walker, rolling  -EN       User Key  (r) = Recorded By, (t) = Taken By, (c) = Cosigned By    Initials Name Provider Type    Oli Lerma, PT Physical Therapist        Goals/Plan     Row Name 03/16/20 0840          Bed Mobility Goal 1 (PT)    Activity/Assistive Device (Bed Mobility Goal 1, PT)  sit to supine;supine to sit  -EN     [place] Level/Cues Needed (Bed Mobility Goal 1, PT)  conditional independence  -EN     Time Frame (Bed Mobility Goal 1, PT)  long term goal (LTG);5 days  -EN     Progress/Outcomes (Bed Mobility Goal 1, PT)  goal revised this date;goal ongoing  -EN     Row Name 03/16/20 0840          Transfer Goal 1 (PT)    Activity/Assistive Device (Transfer Goal 1, PT)  sit-to-stand/stand-to-sit;bed-to-chair/chair-to-bed  -EN     [place] Level/Cues Needed (Transfer Goal 1, PT)  conditional independence  -EN     Time Frame (Transfer Goal 1, PT)  long term goal (LTG);5 days  -EN     Progress/Outcome (Transfer Goal 1, PT)  goal ongoing  -EN     Row Name 03/16/20 0840          Gait Training Goal 1 (PT)    Activity/Assistive Device (Gait Training Goal 1, PT)  gait (walking locomotion)  -EN     [place] Level (Gait Training Goal 1, PT)  contact guard assist  -EN     Distance (Gait Goal 1, PT)  300 feet  -EN     Time Frame (Gait Training Goal 1, PT)  2 weeks  -EN     Progress/Outcome (Gait Training Goal 1, PT)  goal ongoing  -EN       User Key  (r) = Recorded By, (t) = Taken By, (c) = Cosigned By    Initials Name Provider Type    Oli Lerma, PT Physical Therapist        Clinical Impression     Row Name 03/16/20 0840          Pain Assessment    Additional Documentation  Pain Scale: Numbers Pre/Post-Treatment (Group)  -EN     Row Name 03/16/20 0840          Pain Scale: Numbers Pre/Post-Treatment    Pain Scale: Numbers, Pretreatment  0/10 - no pain  -EN     Pain  Scale: Numbers, Post-Treatment  0/10 - no pain  -EN     Row Name 03/16/20 0840          Physical Therapy Clinical Impression    Criteria for Skilled Interventions Met (PT Clinical Impression)  yes;treatment indicated  -EN     Rehab Potential (PT Clinical Summary)  good, to achieve stated therapy goals  -EN     Row Name 03/16/20 0840          Vital Signs    Pre Systolic BP Rehab  146  -EN     Pre Treatment Diastolic BP  87  -EN     Post Systolic BP Rehab  135  -EN     Post Treatment Diastolic BP  69  -EN     Pre Patient Position  Sitting  -EN     Intra Patient Position  Standing  -EN     Post Patient Position  Sitting  -EN     Row Name 03/16/20 0840          Positioning and Restraints    Pre-Treatment Position  in bed  -EN     Post Treatment Position  chair  -EN     In Chair  notified nsg;reclined;call light within reach;encouraged to call for assist;exit alarm on;legs elevated  -EN       User Key  (r) = Recorded By, (t) = Taken By, (c) = Cosigned By    Initials Name Provider Type    Oli Lerma, PT Physical Therapist        Outcome Measures     Row Name 03/16/20 0840          How much help from another person do you currently need...    Turning from your back to your side while in flat bed without using bedrails?  3  -EN     Moving from lying on back to sitting on the side of a flat bed without bedrails?  2  -EN     Moving to and from a bed to a chair (including a wheelchair)?  3  -EN     Standing up from a chair using your arms (e.g., wheelchair, bedside chair)?  3  -EN     Climbing 3-5 steps with a railing?  2  -EN     To walk in hospital room?  2  -EN     AM-PAC 6 Clicks Score (PT)  15  -EN     Row Name 03/16/20 0840          Modified [place] Scale    Pre-Stroke Modified [place] Scale  0 - No Symptoms at all.  -EN     Modified [place] Scale  3 - Moderate disability.  Requiring some help, but able to walk without assistance.  -EN     Row Name 03/16/20 0840          Functional Assessment    Outcome Measure Options   AM-PAC 6 Clicks Basic Mobility (PT)  -       User Key  (r) = Recorded By, (t) = Taken By, (c) = Cosigned By    Initials Name Provider Type    Oli Lerma PT Physical Therapist          PT Recommendation and Plan  Planned Therapy Interventions (PT Eval): balance training, bed mobility training, home exercise program, gait training, patient/family education, strengthening, transfer training  Outcome Summary/Treatment Plan (PT)  Anticipated Discharge Disposition (PT): inpatient rehabilitation facility  Plan of Care Reviewed With: patient, daughter  Progress: improving  Outcome Summary: Pt ambulated 130 feet using RW and mod A for L lateral lean and assist for L hand  on RW. Gait limited by fatigue. Bed mobility and STS requiring CGA. No dizziness/LH noted with mobility tasks. Will continue to progress strength and mobility as able.     Time Calculation:   PT Charges     Row Name 03/16/20 0840             Time Calculation    Start Time  0840  -      PT Received On  03/16/20  -      PT Goal Re-Cert Due Date  03/23/20  -         Time Calculation- PT    Total Timed Code Minutes- PT  23 minute(s)  -         Timed Charges    92392 - Gait Training Minutes   23  -EN        User Key  (r) = Recorded By, (t) = Taken By, (c) = Cosigned By    Initials Name Provider Type    Oli Lerma PT Physical Therapist        Therapy Charges for Today     Code Description Service Date Service Provider Modifiers Qty    [id number] HC GAIT TRAINING EA 15 MIN 3/16/2020 Oli Peterson, PT GP 2          PT G-Codes  Outcome Measure Options: AM-PAC 6 Clicks Basic Mobility (PT)  AM-PAC 6 Clicks Score (PT): 15  AM-PAC 6 Clicks Score (OT): 13  Modified [place] Scale: 3 - Moderate disability.  Requiring some help, but able to walk without assistance.    Oli Peterson PT  3/16/2020

## 2020-03-16 NOTE — PLAN OF CARE
Problem: Patient Care Overview  Goal: Plan of Care Review  Flowsheets (Taken 3/16/2020 0712)  Progress: improving  Plan of Care Reviewed With: patient; daughter  Outcome Summary: Pt ambulated 130 feet using RW and mod A for L lateral lean and assist for L hand  on RW. Gait limited by fatigue. Bed mobility and STS requiring CGA. No dizziness/LH noted with mobility tasks. Will continue to progress strength and mobility as able.

## 2020-03-16 NOTE — PROGRESS NOTES
Case Management Discharge Note      Final Note: Per Ying farias/ [facility], they are able to accept patient today. Nurse to call report to [phone], dc summary, with therapy notes, to be faxed to [phone]. Patient and dtr notifed at bedside and are agreeable. Dtr will be providing transport. CM following.          Destination - Selection Complete      Service Provider Request Status Selected Services Address Phone Number Fax Number    [facility] Selected Inpatient Rehabilitation [address] [place] [id number] [phone]                Final Discharge Disposition Code: 62 - inpatient rehab facility

## 2020-03-16 NOTE — THERAPY RE-EVALUATION
Acute Care - Speech Language Pathology Re-Evaluation  [facility]   Cognitive-Communication Evaluation  & Clinical Swallow Evaluation     Patient Name: Maria Luz Wade  : 1945  MRN: [id number]  Today's Date: 3/16/2020               Admit Date: 3/11/2020     Visit Dx:    ICD-10-CM ICD-9-CM   1. Dysphagia, unspecified type R13.10 787.20   2. Cognitive communication deficit R41.841 799.52   3. Cerebrovascular accident (CVA) due to embolism of right anterior cerebral artery (CMS/HCC) I63.421 434.11   4. Dysarthria R47.1 784.51     Patient Active Problem List   Diagnosis   • Dizziness   • PVC (premature ventricular contraction)   • Smoker   • Palpitations   • Dyslipidemia on statin    • Essential hypertension   • R MCA AIS s/p tPA and thrombectomy    • Tissue plasminogen activator (tPA) administered at other facility within 24 hours prior to current admission   • Intracranial bleed (CMS/HCC); hemorrhagic conversion of an ischemic stroke     Past Medical History:   Diagnosis Date   • History of tonsillectomy    • History of tubal ligation    • Hyperlipidemia      Past Surgical History:   Procedure Laterality Date   • CARDIOVASCULAR STRESS TEST  2017    L.Myoview- R/O Anterior Ischemia.   • CONVERTED (HISTORICAL) HOLTER  2020    AVG 77. . 6.8% PVC. one 6 beats run of V-Tach   • ECHO - CONVERTED  2017    EF 65%. Mild-Mod MR   • INTERVENTIONAL RADIOLOGY PROCEDURE Bilateral 3/11/2020    Procedure: CAROTID CEREBRAL ANGIOGRAM BILATERAL;  Surgeon: Adryan Carter MD;  Location: [facility] INVASIVE LOCATION;  Service: Interventional Radiology;  Laterality: Bilateral;   • OTHER SURGICAL HISTORY      CT Scan- 60% (L) ICA. 50% (R) ICA   • US CAROTID UNILATERAL  2017    @ [facility]. No sig stenosis        SLP EVALUATION (last 72 hours)      SLP SLC Evaluation     Row Name 20 0945                   Communication Assessment/Intervention    Document Type  re-evaluation  -         Subjective Information  no complaints  -AC        Patient Observations  alert;cooperative  -AC        Patient/Family Observations  Dtr present.  -AC        Patient Effort  good  -AC           General Information    Patient Profile Reviewed  yes  -AC        Pertinent History Of Current Problem  R MCA CVA s/p tPA and mechanical thrombectomy.   -AC        Prior Level of Function-Communication  WFL  -AC        Plans/Goals Discussed with  patient and family;agreed upon  -        Barriers to Rehab  none identified  -AC        Patient's Goals for Discharge  return to all previous roles/activities  -        Family Goals for Discharge  patient able to return to previous activities/roles  -           Pain Scale: FACES Pre/Post-Treatment    Pain: FACES Scale, Pretreatment  0-->no hurt  -AC        Pain: FACES Scale, Post-Treatment  0-->no hurt  -AC           Comprehension Assessment/Intervention    Comprehension Assessment/Intervention  Auditory Comprehension  -           Auditory Comprehension Assessment/Intervention    Auditory Comprehension (Communication)  WFL  -AC        Answers Questions (Communication)  WFL;complex;yes/no  -AC        Able to Follow Commands (Communication)  WFL;2-step  -AC        Narrative Discourse  WFL;conversational level  -           Expression Assessment/Intervention    Expression Assessment/Intervention  verbal expression  -AC           Verbal Expression Assessment/Intervention    Verbal Expression  WFL  -AC        Automatic Speech (Communication)  WFL;response to greeting  -AC        Confrontational Naming  WFL;high frequency  -AC        Conversational Discourse/Fluency  WFL  -AC           Oral Musculature and Cranial Nerve Assessment    Oral Motor General Assessment  oral labial or buccal impairment;lingual impairment  -AC        Oral Labial or Buccal Impairment, Detail, Cranial Nerve VII (Facial):  left labial droop  -        Lingual Impairment, Detail. Cranial Nerves IX, XII  (Glossopharyngeal and Hypoglossal)  reduced strength left  -AC           Motor Speech Assessment/Intervention    Motor Speech Function  mild impairment;familiar listener;unfamiliar listener  -AC        Characteristics Consistent with Dysarthria  decreased articulation  -AC        Conversational Speech (Communication)  mild impairment;moderate complexity  -AC        Motor Speech, Comment  Pt 75% intelligible to unfamiliar listener in conv speech. Dtr reported pt's speech improving, but not back to baseline. Worse when pt fatigued.   -AC           Cognitive Assessment Intervention- SLP    Cognitive Function (Cognition)  mild impairment;moderate impairment  -        Orientation Status (Cognition)  WFL;person;place;time;situation  -        Memory (Cognitive)  mild impairment;short-term;delayed;other (see comments) immediate recall: 5/5words, 3-min delay: 3/5words (?bslne)  -        Thought Organization (Cognitive)  WFL;abstract convergent;drawing conclusions  -        Reasoning (Cognitive)  WFL;simple;deductive  -AC        Problem Solving (Cognitive)  mild impairment;moderate impairment;simple  -AC        Executive Function (Cognition)  mild impairment;moderate impairment;deficit awareness;self-monitoring/correction  -        Right Hemisphere Function  mild impairment;moderate impairment;deficit awareness;safety awareness  -           SLP Clinical Impressions    SLP Diagnosis  Mild-moderate cognitive-communication disorder. Mild dysarthria. Pt/family reported pt was independent w/ ADLs prior to admit.  -AC        Rehab Potential/Prognosis  good  -AC        SLC Criteria for Skilled Therapy Interventions Met  yes  -AC        Functional Impact  difficulty completing home management task;decreased ability to respond to situations safely;restrictions in personal and social life;Poor Judgement  -           Recommendations    Therapy Frequency (SLP SLC)  5 days per week  -AC        Predicted Duration Therapy  Intervention (Days)  until discharge  -AC        Anticipated Dischage Disposition  inpatient rehabilitation facility;anticipate therapy at next level of care  -AC          User Key  (r) = Recorded By, (t) = Taken By, (c) = Cosigned By    Initials Name Effective Dates    AC Padmini Le, MS CCC-SLP 07/27/17 -              EDUCATION  The patient has been educated in the following areas:     Cognitive Impairment Communication Impairment.    SLP Recommendation and Plan  SLP Diagnosis: Mild-moderate cognitive-communication disorder. Mild dysarthria. Pt/family reported pt was independent w/ ADLs prior to admit.     Swallow Criteria for Skilled Therapeutic Interventions Met: demonstrates skilled criteria  SLC Criteria for Skilled Therapy Interventions Met: yes  Anticipated Dischage Disposition: inpatient rehabilitation facility, anticipate therapy at next level of care  Therapy Frequency (Swallow): 5 days per week  Predicted Duration Therapy Intervention (Days): until discharge    Plan of Care Reviewed With: patient, daughter      SLP GOALS     Row Name 03/16/20 0945 03/13/20 1330          Oral Nutrition/Hydration Goal 1 (SLP)    Oral Nutrition/Hydration Goal 1, SLP  LTG: Pt will return to regular diet, thin liquids w/o aspiration or difficulty.  -AC  LTG: Pt will return to regular diet, thin liquids w/o aspiration or difficulty.  -VO     Time Frame (Oral Nutrition/Hydration Goal 1, SLP)  short term goal (STG)  -AC  short term goal (STG)  -VO     Progress/Outcomes (Oral Nutrition/Hydration Goal 1, SLP)  goal ongoing  -AC  goal ongoing  -VO        Oral Nutrition/Hydration Goal 2 (SLP)    Oral Nutrition/Hydration Goal 2, SLP  Pt will demonstrate adequate mastication of solid w/o oral pocketing/residue w/ 100% acc w/o cues in order to determine readiness for diet upgrade.  -AC  Pt will demonstrate adequate mastication of solid w/o oral pocketing/residue w/ 100% acc w/o cues.  -VO     Time Frame (Oral Nutrition/Hydration  Goal 2, SLP)  short term goal (STG)  -AC  short term goal (STG)  -VO     Progress/Outcomes (Oral Nutrition/Hydration Goal 2, SLP)  goal ongoing  -AC  goal ongoing  -VO        Oral Nutrition/Hydration Goal (SLP)    Oral Nutrition/Hydration Goal, SLP  Pt will tolerate trials of nectar-thick liquid and soft solids w/o s/sxs aspiration or complication w/ 100% acc w/o cues.  -AC  --     Time Frame (Oral Nutrition/Hydration Goal, SLP)  short term goal (STG)  -AC  --        Labial Strengthening Goal 1 (SLP)    Activity (Labial Strengthening Goal 1, SLP)  increase labial tone  -AC  --     Increase Labial Tone  labial resistance exercises  -AC  --     [place]/Accuracy (Labial Strengthening Goal 1, SLP)  with minimal cues (75-90% accuracy)  -AC  --     Time Frame (Labial Strengthening Goal 1, SLP)  short term goal (STG)  -AC  --        Lingual Strengthening Goal 1 (SLP)    Activity (Lingual Strengthening Goal 1, SLP)  increase tongue back strength  -AC  increase lingual tone/sensation/control/coordination/movement  -VO     Increase Lingual Tone/Sensation/Control/Coordination/Movement  swallow trials;lingual resistance exercises  -AC  swallow trials;lingual resistance exercises  -VO     Increase Tongue Back Strength  lingual resistance exercises  -AC  --     [place]/Accuracy (Lingual Strengthening Goal 1, SLP)  with minimal cues (75-90% accuracy)  -AC  with minimal cues (75-90% accuracy)  -VO     Time Frame (Lingual Strengthening Goal 1, SLP)  short term goal (STG)  -AC  short term goal (STG)  -VO     Progress/Outcomes (Lingual Strengthening Goal 1, SLP)  goal ongoing  -AC  goal ongoing  -VO        Swallow Compensatory Strategies Goal 1 (SLP)    Activity (Swallow Compensatory Strategies/Techniques Goal 1, SLP)  compensatory strategies;food/liquid placed on stronger right side;alternate food/liquid intake lingual sweep  -AC  compensatory strategies;food/liquid placed on stronger right side;alternate food/liquid intake  lingual sweep  -VO     [place]/Accuracy (Swallow Compensatory Strategies/Techniques Goal 1, SLP)  with minimal cues (75-90% accuracy)  -AC  with minimal cues (75-90% accuracy)  -VO     Time Frame (Swallow Compensatory Strategies/Techniques Goal 1, SLP)  short term goal (STG)  -AC  short term goal (STG)  -VO     Progress/Outcomes (Swallow Compensatory Strategies/Techniques Goal 1, SLP)  goal ongoing  -AC  goal ongoing  -VO        Articulation Goal 1 (SLP)    Improve Articulation Goal 1 (SLP)  by over-articulating at phrase level;90%;with minimal cues (75-90%)  -AC  --     Time Frame (Articulation Goal 1, SLP)  short term goal (STG)  -AC  --        Memory Skills Goal 1 (SLP)    Improve Memory Skills Through Goal 1 (SLP)  recalling unrelated word lists with an imposed delay;use memory strategies;90%;with minimal cues (75-90%)  -AC  --     Time Frame (Memory Skills Goal 1, SLP)  short term goal (STG)  -AC  --        Functional Problem Solving Skills Goal 1 (SLP)    Improve Problem Solving Through Goal 1 (SLP)  determine solutions to simple ADL/safety problems;complete organization/home management task;80%;with minimal cues (75-90%)  -AC  --     Time Frame (Problem Solving Goal 1, SLP)  short term goal (STG)  -AC  --        Executive Functional Skills Goal 1 (SLP)    Improve Executive Function Skills Goal 1 (SLP)  demonstrate awareness of deficit;identify strategies, strengths, limitations;identify anticipated needs;perform self-correction;perform self-evaluation;80%;with minimal cues (75-90%)  -AC  --     Time Frame (Executive Function Skills Goal 1, SLP)  short term goal (STG)  -AC  --        Additional Goal 1 (SLP)    Additional Goal 1, SLP  LTG: Pt will improve cognitive-communication skills in order to participate in care in hospital setting w/ 100% acc w/o cues.  -AC  --     Time Frame (Additional Goal 1, SLP)  by discharge  -AC  --       User Key  (r) = Recorded By, (t) = Taken By, (c) = Cosigned By     Initials Name Provider Type    Padmini Millan, MS CCC-SLP Speech and Language Pathologist    Callie Paredes MA,CCC-SLP Speech and Language Pathologist                  Time Calculation:     Time Calculation- SLP     Row Name 20 1036             Time Calculation- SLP    SLP Start Time  0945  -      SLP Received On  20  -        User Key  (r) = Recorded By, (t) = Taken By, (c) = Cosigned By    Initials Name Provider Type    Padmini Millan, MS CCC-SLP Speech and Language Pathologist          Therapy Charges for Today     Code Description Service Date Service Provider Modifiers Qty    68544568361 HC ST EVAL SPEECH AND PROD W LANG  2 3/16/2020 Padmini Le, MS CCC-SLP GN 1    98593799254 HC ST EVAL ORAL PHARYNG SWALLOW 3 3/16/2020 Padmini Le, MS CCC-SLP GN 1                     Padmini ASHFORD Rey MS CCC-SLP  3/16/2020 and Acute Care - Speech Language Pathology   Swallow Re-Evaluation [facility]     Patient Name: Maria Luz Wade  : 1945  MRN: [id number]  Today's Date: 3/16/2020               Admit Date: 3/11/2020    Visit Dx:     ICD-10-CM ICD-9-CM   1. Dysphagia, unspecified type R13.10 787.20   2. Cognitive communication deficit R41.841 799.52   3. Cerebrovascular accident (CVA) due to embolism of right anterior cerebral artery (CMS/HCC) I63.421 434.11   4. Dysarthria R47.1 784.51     Patient Active Problem List   Diagnosis   • Dizziness   • PVC (premature ventricular contraction)   • Smoker   • Palpitations   • Dyslipidemia on statin    • Essential hypertension   • R MCA AIS s/p tPA and thrombectomy    • Tissue plasminogen activator (tPA) administered at other facility within 24 hours prior to current admission   • Intracranial bleed (CMS/HCC); hemorrhagic conversion of an ischemic stroke     Past Medical History:   Diagnosis Date   • History of tonsillectomy    • History of tubal ligation    • Hyperlipidemia      Past Surgical History:   Procedure Laterality Date   •  CARDIOVASCULAR STRESS TEST  11/30/2017    L.Myoview- R/O Anterior Ischemia.   • CONVERTED (HISTORICAL) HOLTER  02/12/2020    AVG 77. . 6.8% PVC. one 6 beats run of V-Tach   • ECHO - CONVERTED  11/30/2017    EF 65%. Mild-Mod MR   • INTERVENTIONAL RADIOLOGY PROCEDURE Bilateral 3/11/2020    Procedure: CAROTID CEREBRAL ANGIOGRAM BILATERAL;  Surgeon: Adryan Carter MD;  Location: [facility] CATH INVASIVE LOCATION;  Service: Interventional Radiology;  Laterality: Bilateral;   • OTHER SURGICAL HISTORY  2015    CT Scan- 60% (L) ICA. 50% (R) ICA   • US CAROTID UNILATERAL  11/16/2017    @ [facility]. No sig stenosis        SWALLOW EVALUATION (last 72 hours)      SLP Adult Swallow Evaluation     Row Name 03/16/20 1000 03/13/20 1330                Rehab Evaluation    Document Type  re-evaluation  -AC  re-evaluation  -VO       Subjective Information  --  no complaints  -VO       Patient Observations  --  alert;cooperative  -VO       Patient Effort  --  good  -VO          General Information    Patient Profile Reviewed  yes  -AC  --       Current Method of Nutrition  soft textures;ground;nectar/syrup-thick liquids  -AC  --       Plans/Goals Discussed with  patient and family;agreed upon  -AC  --       Barriers to Rehab  none identified  -AC  --       Patient's Goals for Discharge  eat/drink without coughing/choking  -AC  --       Family Goals for Discharge  patient able to eat/drink without coughing/choking  -AC  --          Pain Scale: Numbers Pre/Post-Treatment    Pain Scale: Numbers, Pretreatment  --  0/10 - no pain  -VO       Pain Scale: Numbers, Post-Treatment  --  0/10 - no pain  -VO          Pain Scale: FACES Pre/Post-Treatment    Pain: FACES Scale, Pretreatment  0-->no hurt  -AC  --       Pain: FACES Scale, Post-Treatment  0-->no hurt  -AC  --          General Eating/Swallowing Observations    Eating/Swallowing Skills  self-fed  -AC  --       Positioning During Eating  upright 90 degree;upright in chair  -AC  --        Utensils Used  spoon;cup;straw  -AC  --       Consistencies Trialed  thin liquids;nectar/syrup-thick liquids;pureed;soft textures;ground;whole  -AC  --          Clinical Swallow Eval    Oral Prep Phase  impaired  -AC  --       Pharyngeal Phase  suspected pharyngeal impairment  -AC  --          Oral Prep Concerns    Oral Prep Concerns  anterior loss;increased prep time  -AC  --       Anterior Loss  thin  -AC  --       Increased Prep Time  mechanical soft  -AC  --       Oral Prep Concerns, Comment  Anterior loss when drinking thin via cup 2' reduced labial seal on L. Incr'd mastication time w/ solids.   -AC  --          Oral Transit Concerns    Oral Transit Concerns  other (see comments)  -AC  --       Oral Transit Concerns, Comment  Suspect possible premature spillage w/ srikanth cracker particles given reduced lingual strength/control.  -AC  --          Pharyngeal Phase Concerns    Pharyngeal Phase Concerns  cough  -AC  --       Cough  mechanical soft  -AC  --       Pharyngeal Phase Concerns, Comment  No overt clinical s/sxs aspiration w/ nectar-thick liquid, puree, or moist/ground solids. No aspiration noted per last FEES completed 3/13; however, dtr reported pt's alertness & strength waxes/wanes, though improving overall. Reported pt w/ intermittent coughing w/ thin liquid, particularly @ end of day or @ end of meal. Suspect pt experiencing fatigue. Pt does not have aversion to nectar-thick liquid.  -AC  --          Fiberoptic Endoscopic Evaluation of Swallowing (FEES)    Risks/Benefits Reviewed  --  risks/benefits explained;patient;family;agreed to eval  -VO       Nasal Entry  --  right:  -VO       Special Considerations  --  837  -VO          Anatomy and Physiology    Anatomic Considerations  --  no anatomic structural deviation  -VO       Velopharyngeal  --  WFL  -VO       Base of Tongue  --  symmetrical  -VO       Epiglottis  --  WFL  -VO       Laryngeal Function Breathing  --  symmetrical  -VO        Laryngeal Function Phonation  --  symmetrical  -VO       Laryngeal Function to Breath Hold  --  TVF/FVF/Arytenoid  -VO       Secretion Rating Scale (Mitch et al. 1996)  --  0- normal, no visible secretions  -VO       Spontaneous Swallow  --  frequency adequate  -VO       Sensory  --  sensed scope  -VO       Consistencies Trialed  --  thin liquids;pudding/puree;Regular textures  -VO          FEES Interpretation    Oral Phase  --  WFL  -VO          Initiation of Pharyngeal Swallow    Initiation of Pharyngeal Swallow  --  WFL  -VO       Pharyngeal Phase  --  functional pharyngeal phase of swallow  -VO       FEES Summary  --  No penetration/aspiration w/ any consistency. No significant residue. Mild-moderat oral dysphagia c/b labial/lingual weakness resulting in prolonged mastication and oral residue and pocketing of foods on L side of oral cavity. Requires cues to lingual sweep/wash. Rec soft/ground, thins.   -VO          Clinical Impression    SLP Swallowing Diagnosis  mild-moderate;oral dysfunction;other (see comments) suspect oral deficits/fatigue leading to pharyngeal s/sxs  -AC  mild-moderate;oral dysfunction;functional pharyngeal phase  -VO       Functional Impact  risk of aspiration/pneumonia  -AC  risk of malnutrition;risk of aspiration/pneumonia  -VO       Rehab Potential/Prognosis, Swallowing  good, to achieve stated therapy goals  -AC  good, to achieve stated therapy goals  -VO       Swallow Criteria for Skilled Therapeutic Interventions Met  demonstrates skilled criteria  -AC  demonstrates skilled criteria  -VO          Recommendations    Therapy Frequency (Swallow)  5 days per week  -AC  3 days per week  -VO       Predicted Duration Therapy Intervention (Days)  until discharge  -AC  until discharge  -VO       SLP Diet Recommendation  mechanical soft with no mixed consistencies;nectar thick liquids;other (see comments) may have thin liquid b/t meals, as alert/tolerated  -AC  soft textures;ground;thin  liquids  -VO       Recommended Precautions and Strategies  upright posture during/after eating;small bites of food and sips of liquid;check mouth frequently for oral residue/pocketing;other (see comments) fatigue precautions  -AC  upright posture during/after eating;small bites of food and sips of liquid;check mouth frequently for oral residue/pocketing  -VO       SLP Rec. for Method of Medication Administration  meds whole;with thick liquids;with pudding or applesauce;as tolerated  -AC  meds whole;with thin liquids;as tolerated  -VO       Monitor for Signs of Aspiration  yes;notify SLP if any concerns  -  yes;notify SLP if any concerns  -VO       Anticipated Dischage Disposition  inpatient rehabilitation facility;anticipate therapy at next level of care  -  inpatient rehabilitation facility;anticipate therapy at next level of care  -VO         User Key  (r) = Recorded By, (t) = Taken By, (c) = Cosigned By    Initials Name Effective Dates     Padmini Le, MS CCC-SLP 07/27/17 -     VO Callie Childs MA,CCC-SLP 10/24/18 -           EDUCATION  The patient has been educated in the following areas:   Dysphagia (Swallowing Impairment) Oral Care/Hydration Modified Diet Instruction.    SLP Recommendation and Plan  SLP Swallowing Diagnosis: mild-moderate, oral dysfunction, other (see comments)(suspect oral deficits/fatigue leading to pharyngeal s/sxs)  SLP Diet Recommendation: mechanical soft with no mixed consistencies, nectar thick liquids, other (see comments)(may have thin liquid b/t meals, as alert/tolerated)  Recommended Precautions and Strategies: upright posture during/after eating, small bites of food and sips of liquid, check mouth frequently for oral residue/pocketing, other (see comments)(fatigue precautions)  SLP Rec. for Method of Medication Administration: meds whole, with thick liquids, with pudding or applesauce, as tolerated     Monitor for Signs of Aspiration: yes, notify SLP if any  concerns     Swallow Criteria for Skilled Therapeutic Interventions Met: demonstrates skilled criteria  Anticipated Dischage Disposition: inpatient rehabilitation facility, anticipate therapy at next level of care  Rehab Potential/Prognosis, Swallowing: good, to achieve stated therapy goals  Therapy Frequency (Swallow): 5 days per week  Predicted Duration Therapy Intervention (Days): until discharge       Plan of Care Reviewed With: patient, daughter    SLP GOALS     Row Name 03/16/20 0945 03/13/20 1330          Oral Nutrition/Hydration Goal 1 (SLP)    Oral Nutrition/Hydration Goal 1, SLP  LTG: Pt will return to regular diet, thin liquids w/o aspiration or difficulty.  -AC  LTG: Pt will return to regular diet, thin liquids w/o aspiration or difficulty.  -VO     Time Frame (Oral Nutrition/Hydration Goal 1, SLP)  short term goal (STG)  -AC  short term goal (STG)  -VO     Progress/Outcomes (Oral Nutrition/Hydration Goal 1, SLP)  goal ongoing  -AC  goal ongoing  -VO        Oral Nutrition/Hydration Goal 2 (SLP)    Oral Nutrition/Hydration Goal 2, SLP  Pt will demonstrate adequate mastication of solid w/o oral pocketing/residue w/ 100% acc w/o cues in order to determine readiness for diet upgrade.  -AC  Pt will demonstrate adequate mastication of solid w/o oral pocketing/residue w/ 100% acc w/o cues.  -VO     Time Frame (Oral Nutrition/Hydration Goal 2, SLP)  short term goal (STG)  -AC  short term goal (STG)  -VO     Progress/Outcomes (Oral Nutrition/Hydration Goal 2, SLP)  goal ongoing  -AC  goal ongoing  -VO        Oral Nutrition/Hydration Goal (SLP)    Oral Nutrition/Hydration Goal, SLP  Pt will tolerate trials of nectar-thick liquid and soft solids w/o s/sxs aspiration or complication w/ 100% acc w/o cues.  -AC  --     Time Frame (Oral Nutrition/Hydration Goal, SLP)  short term goal (STG)  -AC  --        Labial Strengthening Goal 1 (SLP)    Activity (Labial Strengthening Goal 1, SLP)  increase labial tone  -AC  --      Increase Labial Tone  labial resistance exercises  -AC  --     [place]/Accuracy (Labial Strengthening Goal 1, SLP)  with minimal cues (75-90% accuracy)  -AC  --     Time Frame (Labial Strengthening Goal 1, SLP)  short term goal (STG)  -AC  --        Lingual Strengthening Goal 1 (SLP)    Activity (Lingual Strengthening Goal 1, SLP)  increase tongue back strength  -AC  increase lingual tone/sensation/control/coordination/movement  -VO     Increase Lingual Tone/Sensation/Control/Coordination/Movement  swallow trials;lingual resistance exercises  -AC  swallow trials;lingual resistance exercises  -VO     Increase Tongue Back Strength  lingual resistance exercises  -AC  --     [place]/Accuracy (Lingual Strengthening Goal 1, SLP)  with minimal cues (75-90% accuracy)  -AC  with minimal cues (75-90% accuracy)  -VO     Time Frame (Lingual Strengthening Goal 1, SLP)  short term goal (STG)  -AC  short term goal (STG)  -VO     Progress/Outcomes (Lingual Strengthening Goal 1, SLP)  goal ongoing  -AC  goal ongoing  -VO        Swallow Compensatory Strategies Goal 1 (SLP)    Activity (Swallow Compensatory Strategies/Techniques Goal 1, SLP)  compensatory strategies;food/liquid placed on stronger right side;alternate food/liquid intake lingual sweep  -AC  compensatory strategies;food/liquid placed on stronger right side;alternate food/liquid intake lingual sweep  -VO     [place]/Accuracy (Swallow Compensatory Strategies/Techniques Goal 1, SLP)  with minimal cues (75-90% accuracy)  -AC  with minimal cues (75-90% accuracy)  -VO     Time Frame (Swallow Compensatory Strategies/Techniques Goal 1, SLP)  short term goal (STG)  -AC  short term goal (STG)  -VO     Progress/Outcomes (Swallow Compensatory Strategies/Techniques Goal 1, SLP)  goal ongoing  -AC  goal ongoing  -VO        Articulation Goal 1 (SLP)    Improve Articulation Goal 1 (SLP)  by over-articulating at phrase level;90%;with minimal cues (75-90%)  -AC  --     Time  Frame (Articulation Goal 1, SLP)  short term goal (STG)  -AC  --        Memory Skills Goal 1 (SLP)    Improve Memory Skills Through Goal 1 (SLP)  recalling unrelated word lists with an imposed delay;use memory strategies;90%;with minimal cues (75-90%)  -AC  --     Time Frame (Memory Skills Goal 1, SLP)  short term goal (STG)  -AC  --        Functional Problem Solving Skills Goal 1 (SLP)    Improve Problem Solving Through Goal 1 (SLP)  determine solutions to simple ADL/safety problems;complete organization/home management task;80%;with minimal cues (75-90%)  -AC  --     Time Frame (Problem Solving Goal 1, SLP)  short term goal (STG)  -AC  --        Executive Functional Skills Goal 1 (SLP)    Improve Executive Function Skills Goal 1 (SLP)  demonstrate awareness of deficit;identify strategies, strengths, limitations;identify anticipated needs;perform self-correction;perform self-evaluation;80%;with minimal cues (75-90%)  -AC  --     Time Frame (Executive Function Skills Goal 1, SLP)  short term goal (STG)  -AC  --        Additional Goal 1 (SLP)    Additional Goal 1, SLP  LTG: Pt will improve cognitive-communication skills in order to participate in care in hospital setting w/ 100% acc w/o cues.  -AC  --     Time Frame (Additional Goal 1, SLP)  by discharge  -AC  --       User Key  (r) = Recorded By, (t) = Taken By, (c) = Cosigned By    Initials Name Provider Type    Padmini Millan MS CCC-SLP Speech and Language Pathologist    Callie Paredes MA,CCC-SLP Speech and Language Pathologist             Time Calculation:   Time Calculation- SLP     Row Name 03/16/20 1036             Time Calculation- SLP    SLP Start Time  0945  -      SLP Received On  03/16/20  -        User Key  (r) = Recorded By, (t) = Taken By, (c) = Cosigned By    Initials Name Provider Type    Padmini Millan MS CCC-SLP Speech and Language Pathologist          Therapy Charges for Today     Code Description Service Date Service Provider  Modifiers Qty    [id number]  ST EVAL SPEECH AND PROD W LANG  2 3/16/2020 Padmini Le, MS CCC-SLP GN 1    [id number]  ST EVAL ORAL PHARYNG SWALLOW 3 3/16/2020 Padmini Le, MS CCC-SLP GN 1               Padmini Le MS CCC-SLP  3/16/2020

## 2020-03-16 NOTE — PLAN OF CARE
Problem: Patient Care Overview  Goal: Plan of Care Review  Outcome: Ongoing (interventions implemented as appropriate)  Flowsheets (Taken 3/16/2020 1036)  Plan of Care Reviewed With: patient; daughter  Note:   SLP re-evaluation completed. Will continue to address dysphagia and cognitive-communication deficits in tx. Please see note for further details and recommendations.

## 2020-03-16 NOTE — PLAN OF CARE
Problem: Patient Care Overview  Goal: Plan of Care Review  3/16/2020 1535 by Aysha Orlando OT  Outcome: Ongoing (interventions implemented as appropriate)  Flowsheets (Taken 3/16/2020 1403)  Outcome Summary: Pt continues to improve transfers. Pt completes sit to stand with Penelope . Pt required maxA to wash feet and Penelope to wash back and fabrice areas. Pt able to compelte all UBB with setup. Pt don pants/underwear with Penelope and required maxA to don socks. Pt demonstrates decreased L FMC. OT issued foam block and theraputty as well as hand out for HEP. Recommend d/c to [facility] when medically ready.

## 2020-04-06 ENCOUNTER — TELEPHONE (OUTPATIENT)
Dept: NEUROSURGERY | Facility: CLINIC | Age: 75
End: 2020-04-06

## 2020-04-06 NOTE — TELEPHONE ENCOUNTER
Pt.'S DAUGHTER (ANISHA) CALLED TO SEE IF Pt QUALIFIES FOR A TELE OR VIDEO VISIT.  DAUGHTER WILL NOT BE THE ONE BRINGING HER THIS VISIT. DAUGHTER IS A NURSE PRACTITIONER AND WILL BE TRAVELING TO [place] TO HELP WITH THE EPIDEMIC.  Pt CURRENTLY LIVES WITH ANISHA AND SON CARLITA TSANG. ANISHA HAS BEEN MONITORING HER CLOSELY SINCE HER SURGERY.  PLEASE CALL ANISHA BACK AND ADVISE.    [phone] WORK

## 2020-04-06 NOTE — TELEPHONE ENCOUNTER
Provider: Raul   Caller: pt's daughter  Time of call: 0040    Phone #:  [phone]  Surgery: Trevo thrombectomy of right MCA thrombus   Surgery Date: 03/11/20   Last visit: hosp     Next visit: 04/15/20             Reason for call: pt was consulted for a CVA on  03/11/20 and is s/p thrombectomy. Please note from hub.

## 2020-04-15 ENCOUNTER — OFFICE VISIT (OUTPATIENT)
Dept: NEUROSURGERY | Facility: CLINIC | Age: 75
End: 2020-04-15

## 2020-04-15 DIAGNOSIS — I63.411 CEREBROVASCULAR ACCIDENT (CVA) DUE TO EMBOLISM OF RIGHT MIDDLE CEREBRAL ARTERY (HCC): Primary | ICD-10-CM

## 2020-04-15 DIAGNOSIS — I62.9 INTRACRANIAL BLEED (HCC): ICD-10-CM

## 2020-04-15 PROCEDURE — 99214 OFFICE O/P EST MOD 30 MIN: CPT | Performed by: PHYSICIAN ASSISTANT

## 2020-04-15 NOTE — PROGRESS NOTES
Subjective     Chief Complaint: Follow-up status post thrombectomy    Patient ID: Maria Luz Wade is a 74 y.o. female is here today for follow-up.    History of Present Illness    This is a 74-year-old woman who presented to [facility] with symptoms of a right hemispheric stroke syndrome.  She received TPA at OSH and was transferred to [facility] for higher level of care.  On arrival, CT perfusion demonstrated a large amount of salvageable ischemic penumbra within the right superior MCA division territory.  Patient was taken for emergent mechanical thrombectomy of right MCA thrombus on 3/11/2020.  Initially, the patient was doing well following the procedure, however she became more lethargic the following day and CT head without showed hemorrhagic conversion of her stroke.  The patient was discharged to [facility] rehab on 3/16/2020, at that time she had improved but continued to have some mild left lower extremity weakness, facial droop and dysarthria, NIH was 4 at discharge.    Presently, the patient is 1 month status post IV TPA and right MCA mechanical thrombectomy with subsequent hemorrhagic conversion of her stroke.  In discussion today, the patient tells me she is living at home in a downstairs apartment with her daughter and son-in-law.  She is participating in home PT/OT/speech.  She has had an excellent recovery.  She is walking nearly 1 mile every night.  She denies any weakness, numbness, speech difficulties, facial droop, dysphagia, dysarthria or visual changes.  She is able to dress herself, ambulate without assistance, and performs all ADLs independently without assistance.  Her only complaint is a very mild occasional memory issues, such as forgetting food she put in the oven. She resumed Eliquis on 3/20/2020.  She is very pleased with her postoperative outcome.    The following portions of the patient's history were reviewed and updated as appropriate: allergies, current medications,  past family history, past medical history, past social history, past surgical history and problem list.    Family history:   Family History   Problem Relation Age of Onset   • Hyperlipidemia Mother    • Hypertension Mother    • Stroke Mother    • Heart attack Father    • Aneurysm Father    • Alzheimer's disease Maternal Aunt    • Stroke Maternal Grandmother    • Stroke Maternal Grandfather        Social history:   Social History     Socioeconomic History   • Marital status:      Spouse name: Not on file   • Number of children: Not on file   • Years of education: Not on file   • Highest education level: Not on file   Tobacco Use   • Smoking status: Current Every Day Smoker     Packs/day: 1.00     Years: 60.00     Pack years: 60.00   • Smokeless tobacco: Never Used   • Tobacco comment: patient counseled on effects of tobacco use on health   Substance and Sexual Activity   • Alcohol use: No   • Drug use: No       Review of Systems   Constitutional: Negative for activity change, diaphoresis, fatigue, fever and unexpected weight change.   HENT: Negative for trouble swallowing.    Respiratory: Negative for chest tightness and shortness of breath.    Cardiovascular: Negative for chest pain and palpitations.   Gastrointestinal: Negative for nausea and vomiting.   Neurological: Negative for dizziness, tremors, seizures, syncope, facial asymmetry, speech difficulty, weakness, light-headedness, numbness and headaches.   All other systems reviewed and are negative.      Objective   There were no vitals taken for this visit.  There is no height or weight on file to calculate BMI.  Patient's There is no height or weight on file to calculate BMI. BMI is within normal parameters. No follow-up required..      Physical Exam   Constitutional: She is oriented to person, place, and time.   Neurological: She is alert and oriented to person, place, and time.   Psychiatric: She has a normal mood and affect. Her behavior is normal.          Assessment/Plan     Ms. Wade presents today in follow-up 1 month status post IV TPA and right MCA mechanical thrombectomy and subsequent hemorrhagic conversion of stroke.  The patient has had an excellent recovery following her stroke and hemorrhage.  She is living independently in a downstairs apartment of her daughter's house.  She performs all ADLs without assistance.  She is walking independently, denies any weakness, numbness, speech difficulties.  She is interested in driving again.  I have referred her to the [facility] driving safety program for evaluation of this.  She is extremely pleased with her outcome following her procedures.      I have ordered an MRI brain without contrast to assess her stroke and hemorrhage.  I will call her with the results of this test.  Signs and symptoms of stroke and the need to call 911 should she experience these were discussed at length with the patient and she voiced understanding.  I instructed her to call our office with any questions, concerns, new or worsening symptoms.  She will follow-up in 2 months for her 90-day post thrombectomy follow-up.    Modified [place] Score: 0     This visit was performed via telemedicine given the coronavirus pandemic.  I spent approximately 20-25 minutes with the patient reviewing prior hospitalization and discussing continued care.      You have chosen to receive care through a telephone visit. Do you consent to use a telephone visit for your medical care today? Yes      Diagnoses and all orders for this visit:    Cerebrovascular accident (CVA) due to embolism of right middle cerebral artery (CMS/HCC)  Comments:  S/p thrombectomy on 3/11/2020  Orders:  -     MRI Brain Without Contrast; Future    Intracranial bleed (CMS/HCC); hemorrhagic conversion of an ischemic stroke        Return in about 2 months (around 6/15/2020), or if symptoms worsen or fail to improve.             Cat Dunbar PA-C

## 2020-04-20 ENCOUNTER — HOSPITAL ENCOUNTER (OUTPATIENT)
Dept: MRI IMAGING | Facility: HOSPITAL | Age: 75
Discharge: HOME | End: 2020-04-20
Admitting: PHYSICIAN ASSISTANT

## 2020-04-20 DIAGNOSIS — I63.411 CEREBROVASCULAR ACCIDENT (CVA) DUE TO EMBOLISM OF RIGHT MIDDLE CEREBRAL ARTERY (HCC): ICD-10-CM

## 2020-04-20 PROCEDURE — 70551 MRI BRAIN STEM W/O DYE: CPT

## 2020-04-20 PROCEDURE — 70551 MRI BRAIN STEM W/O DYE: CPT | Performed by: RADIOLOGY

## 2020-04-21 ENCOUNTER — TELEPHONE (OUTPATIENT)
Dept: NEUROSURGERY | Facility: CLINIC | Age: 75
End: 2020-04-21

## 2020-04-21 NOTE — TELEPHONE ENCOUNTER
Tried calling both numbers to add on to Cat's schedule for a telephone visit. No answer LVM on both for a return call.

## 2020-04-22 ENCOUNTER — OFFICE VISIT (OUTPATIENT)
Dept: NEUROSURGERY | Facility: CLINIC | Age: 75
End: 2020-04-22

## 2020-04-22 DIAGNOSIS — I63.411 CEREBROVASCULAR ACCIDENT (CVA) DUE TO EMBOLISM OF RIGHT MIDDLE CEREBRAL ARTERY (HCC): Primary | ICD-10-CM

## 2020-04-22 DIAGNOSIS — I62.9 INTRACRANIAL BLEED (HCC): ICD-10-CM

## 2020-04-22 PROCEDURE — 99441 PR PHYS/QHP TELEPHONE EVALUATION 5-10 MIN: CPT | Performed by: PHYSICIAN ASSISTANT

## 2020-04-22 NOTE — PROGRESS NOTES
Subjective     Chief Complaint: F/u to review MRI     Patient ID: Maria Luz Wade is a 74 y.o. female is here today for follow-up.    History of Present Illness    This is a 74-year-old woman who presented to [facility] with symptoms of a right hemispheric stroke syndrome.  She received TPA at OSH and was transferred to [facility] for higher level of care.  On arrival, CT perfusion demonstrated a large amount of salvageable ischemic penumbra within the right superior MCA division territory.  Patient was taken for emergent mechanical thrombectomy of right MCA thrombus on 3/11/2020.  Initially, the patient was doing well following the procedure, however she became more lethargic the following day and CT head without showed hemorrhagic conversion of her stroke.  The patient was discharged to [facility] rehab on 3/16/2020, at that time she had improved but continued to have some mild left lower extremity weakness, facial droop and dysarthria, NIH was 4 at discharge.     Presently, the patient is 1 month status post IV TPA and right MCA mechanical thrombectomy with subsequent hemorrhagic conversion of her stroke.  In discussion today, the patient tells me she is living at home in a downstairs apartment with her daughter and son-in-law.  She is participating in home PT/OT/speech.  She has had an excellent recovery.  She is walking nearly 1 mile every night.  She denies any weakness, numbness, speech difficulties, facial droop, dysphagia, dysarthria or visual changes.  She is able to dress herself, ambulate without assistance, and performs all ADLs independently without assistance.  Her only complaint is a very mild occasional memory issues, such as forgetting food she put in the oven. She resumed Eliquis on 3/20/2020.  She is very pleased with her postoperative outcome.    She reports no change in symptoms since seen one week ago.     The following portions of the patient's history were reviewed and updated  as appropriate: allergies, current medications, past family history, past medical history, past social history, past surgical history and problem list.    Family history:   Family History   Problem Relation Age of Onset   • Hyperlipidemia Mother    • Hypertension Mother    • Stroke Mother    • Heart attack Father    • Aneurysm Father    • Alzheimer's disease Maternal Aunt    • Stroke Maternal Grandmother    • Stroke Maternal Grandfather        Social history:   Social History     Socioeconomic History   • Marital status:      Spouse name: Not on file   • Number of children: Not on file   • Years of education: Not on file   • Highest education level: Not on file   Tobacco Use   • Smoking status: Current Every Day Smoker     Packs/day: 1.00     Years: 60.00     Pack years: 60.00   • Smokeless tobacco: Never Used   • Tobacco comment: patient counseled on effects of tobacco use on health   Substance and Sexual Activity   • Alcohol use: No   • Drug use: No       Review of Systems   Constitutional: Negative for activity change, appetite change, chills, diaphoresis, fatigue, fever and unexpected weight change.   HENT: Negative for congestion, dental problem, drooling, ear discharge, ear pain, facial swelling, hearing loss, mouth sores, nosebleeds, postnasal drip, rhinorrhea, sinus pressure, sinus pain, sneezing, sore throat, tinnitus, trouble swallowing and voice change.    Eyes: Negative for photophobia, pain, discharge, redness, itching and visual disturbance.   Respiratory: Negative for apnea, cough, choking, chest tightness, shortness of breath, wheezing and stridor.    Cardiovascular: Negative for chest pain, palpitations and leg swelling.   Gastrointestinal: Negative for abdominal distention, abdominal pain, anal bleeding, blood in stool, constipation, diarrhea, nausea, rectal pain and vomiting.   Endocrine: Negative for cold intolerance, heat intolerance, polydipsia, polyphagia and polyuria.   Genitourinary:  Negative for decreased urine volume, difficulty urinating, dysuria, enuresis, flank pain, frequency, genital sores, hematuria and urgency.   Musculoskeletal: Negative for arthralgias, back pain, gait problem, joint swelling, myalgias, neck pain and neck stiffness.   Skin: Negative for color change, pallor, rash and wound.   Allergic/Immunologic: Negative for environmental allergies, food allergies and immunocompromised state.   Neurological: Negative for dizziness, tremors, seizures, syncope, facial asymmetry, speech difficulty, weakness, light-headedness, numbness and headaches.   Hematological: Negative for adenopathy. Does not bruise/bleed easily.   Psychiatric/Behavioral: Negative for agitation, behavioral problems, confusion, decreased concentration, dysphoric mood, hallucinations, self-injury, sleep disturbance and suicidal ideas. The patient is not nervous/anxious and is not hyperactive.        Objective   There were no vitals taken for this visit.  There is no height or weight on file to calculate BMI.  Patient's There is no height or weight on file to calculate BMI. BMI is within normal parameters. No follow-up required..      Physical Exam      Assessment/Plan     Ms. Wade is seen today in follow-up with a new MRI brain performed on 4/20/2020.  This was reviewed with Dr. Carter and demonstrates well-defined area of hemorrhage in the right MCA.  This is decreased in size as compared to her CT head performed during hospitalization on 3/13/2020.    She is doing exceedingly well following IV TPA, right MCA mechanical thrombectomy and subsequent hemorrhagic conversion of stroke.  She continues to perform all ADLs independently without assistance.  Signs and symptoms of stroke and the need to call 911 should she experience these were discussed at length with the patient and she voiced understanding.  Instructed patient to call with any questions, concerns or new or worsening symptoms. She will follow-up as  scheduled in 2 months for 90-day post thrombectomy follow-up.    Modified [place] score: 0    You have chosen to receive care through a telephone visit. Do you consent to use a telephone visit for your medical care today? Yes  I spent approximately 10 minutes with the patient on the phone.     Diagnoses and all orders for this visit:    Cerebrovascular accident (CVA) due to embolism of right middle cerebral artery (CMS/HCC)    Intracranial bleed (CMS/HCC); hemorrhagic conversion of an ischemic stroke        Return in about 2 months (around 6/22/2020), or if symptoms worsen or fail to improve.             Cat Dunbar PA-C

## 2020-06-07 ENCOUNTER — APPOINTMENT (OUTPATIENT)
Dept: MRI IMAGING | Facility: HOSPITAL | Age: 75
End: 2020-06-07

## 2020-06-07 ENCOUNTER — APPOINTMENT (OUTPATIENT)
Dept: CT IMAGING | Facility: HOSPITAL | Age: 75
End: 2020-06-07

## 2020-06-07 ENCOUNTER — HOSPITAL ENCOUNTER (OUTPATIENT)
Facility: HOSPITAL | Age: 75
Setting detail: OBSERVATION
Discharge: HOME | End: 2020-06-08
Attending: EMERGENCY MEDICINE | Admitting: INTERNAL MEDICINE

## 2020-06-07 ENCOUNTER — APPOINTMENT (OUTPATIENT)
Dept: GENERAL RADIOLOGY | Facility: HOSPITAL | Age: 75
End: 2020-06-07

## 2020-06-07 DIAGNOSIS — I63.9 ACUTE CVA (CEREBROVASCULAR ACCIDENT) (HCC): Primary | ICD-10-CM

## 2020-06-07 DIAGNOSIS — Z78.9 IMPAIRED MOBILITY AND ADLS: ICD-10-CM

## 2020-06-07 DIAGNOSIS — Z74.09 IMPAIRED MOBILITY AND ADLS: ICD-10-CM

## 2020-06-07 PROBLEM — R09.89 SUSPECTED CEREBROVASCULAR ACCIDENT (CVA): Status: ACTIVE | Noted: 2020-06-07

## 2020-06-07 PROBLEM — I10 ESSENTIAL HYPERTENSION: Status: ACTIVE | Noted: 2020-06-07

## 2020-06-07 LAB
ALT SERPL W P-5'-P-CCNC: 8 U/L (ref 1–33)
APTT PPP: 30.4 SECONDS (ref 24–37)
AST SERPL-CCNC: 25 U/L (ref 1–32)
BASE EXCESS BLDA CALC-SCNC: 1 MMOL/L (ref -5–5)
BASOPHILS # BLD AUTO: 0.18 10*3/MM3 (ref 0–0.2)
BASOPHILS NFR BLD AUTO: 1.7 % (ref 0–1.5)
CA-I BLDA-SCNC: 1.2 MMOL/L (ref 1.2–1.32)
CO2 BLDA-SCNC: 26 MMOL/L (ref 24–29)
CREAT BLDA-MCNC: 1 MG/DL (ref 0.6–1.3)
DEPRECATED RDW RBC AUTO: 45.8 FL (ref 37–54)
EOSINOPHIL # BLD AUTO: 0.01 10*3/MM3 (ref 0–0.4)
EOSINOPHIL NFR BLD AUTO: 0.1 % (ref 0.3–6.2)
ERYTHROCYTE [DISTWIDTH] IN BLOOD BY AUTOMATED COUNT: 13.3 % (ref 12.3–15.4)
GLUCOSE BLDC GLUCOMTR-MCNC: 92 MG/DL (ref 70–130)
HCO3 BLDA-SCNC: 25.1 MMOL/L (ref 22–26)
HCT VFR BLD AUTO: 44 % (ref 34–46.6)
HCT VFR BLDA CALC: 42 % (ref 38–51)
HGB BLD-MCNC: 14.3 G/DL (ref 12–15.9)
HGB BLDA-MCNC: 14.3 G/DL (ref 12–17)
HOLD SPECIMEN: NORMAL
HOLD SPECIMEN: NORMAL
IMM GRANULOCYTES # BLD AUTO: 0.09 10*3/MM3 (ref 0–0.05)
IMM GRANULOCYTES NFR BLD AUTO: 0.8 % (ref 0–0.5)
INR PPP: 1 (ref 0.8–1.2)
LYMPHOCYTES # BLD AUTO: 3.11 10*3/MM3 (ref 0.7–3.1)
LYMPHOCYTES NFR BLD AUTO: 28.8 % (ref 19.6–45.3)
MCH RBC QN AUTO: 30.3 PG (ref 26.6–33)
MCHC RBC AUTO-ENTMCNC: 32.5 G/DL (ref 31.5–35.7)
MCV RBC AUTO: 93.2 FL (ref 79–97)
MONOCYTES # BLD AUTO: 1 10*3/MM3 (ref 0.1–0.9)
MONOCYTES NFR BLD AUTO: 9.3 % (ref 5–12)
NEUTROPHILS # BLD AUTO: 6.41 10*3/MM3 (ref 1.7–7)
NEUTROPHILS NFR BLD AUTO: 59.3 % (ref 42.7–76)
NRBC BLD AUTO-RTO: 0 /100 WBC (ref 0–0.2)
PCO2 BLDA: 37.8 MM HG (ref 35–45)
PH BLDA: 7.43 PH UNITS (ref 7.35–7.6)
PLATELET # BLD AUTO: 359 10*3/MM3 (ref 140–450)
PMV BLD AUTO: 11.6 FL (ref 6–12)
PO2 BLDA: 36 MMHG (ref 80–105)
POTASSIUM BLDA-SCNC: 4.9 MMOL/L (ref 3.5–4.9)
PROTHROMBIN TIME: 12 SECONDS (ref 12.8–15.2)
RBC # BLD AUTO: 4.72 10*6/MM3 (ref 3.77–5.28)
SAO2 % BLDA: 71 % (ref 95–98)
SODIUM BLDA-SCNC: 139 MMOL/L (ref 138–146)
TROPONIN T SERPL-MCNC: <0.01 NG/ML (ref 0–0.03)
WBC NRBC COR # BLD: 10.8 10*3/MM3 (ref 3.4–10.8)
WHOLE BLOOD HOLD SPECIMEN: NORMAL
WHOLE BLOOD HOLD SPECIMEN: NORMAL

## 2020-06-07 PROCEDURE — 82947 ASSAY GLUCOSE BLOOD QUANT: CPT

## 2020-06-07 PROCEDURE — 85730 THROMBOPLASTIN TIME PARTIAL: CPT | Performed by: EMERGENCY MEDICINE

## 2020-06-07 PROCEDURE — 0042T HC CT CEREBRAL PERFUSION W/WO CONTRAST: CPT

## 2020-06-07 PROCEDURE — 71045 X-RAY EXAM CHEST 1 VIEW: CPT

## 2020-06-07 PROCEDURE — 0 IOPAMIDOL PER 1 ML: Performed by: EMERGENCY MEDICINE

## 2020-06-07 PROCEDURE — 84450 TRANSFERASE (AST) (SGOT): CPT | Performed by: EMERGENCY MEDICINE

## 2020-06-07 PROCEDURE — 82565 ASSAY OF CREATININE: CPT

## 2020-06-07 PROCEDURE — 85014 HEMATOCRIT: CPT

## 2020-06-07 PROCEDURE — 99220 PR INITIAL OBSERVATION CARE/DAY 70 MINUTES: CPT | Performed by: INTERNAL MEDICINE

## 2020-06-07 PROCEDURE — 99284 EMERGENCY DEPT VISIT MOD MDM: CPT

## 2020-06-07 PROCEDURE — 85025 COMPLETE CBC W/AUTO DIFF WBC: CPT | Performed by: EMERGENCY MEDICINE

## 2020-06-07 PROCEDURE — G0378 HOSPITAL OBSERVATION PER HR: HCPCS

## 2020-06-07 PROCEDURE — 70450 CT HEAD/BRAIN W/O DYE: CPT

## 2020-06-07 PROCEDURE — 85610 PROTHROMBIN TIME: CPT

## 2020-06-07 PROCEDURE — 70551 MRI BRAIN STEM W/O DYE: CPT

## 2020-06-07 PROCEDURE — 84484 ASSAY OF TROPONIN QUANT: CPT | Performed by: EMERGENCY MEDICINE

## 2020-06-07 PROCEDURE — 82330 ASSAY OF CALCIUM: CPT

## 2020-06-07 PROCEDURE — 84132 ASSAY OF SERUM POTASSIUM: CPT

## 2020-06-07 PROCEDURE — 99204 OFFICE O/P NEW MOD 45 MIN: CPT | Performed by: NURSE PRACTITIONER

## 2020-06-07 PROCEDURE — 84295 ASSAY OF SERUM SODIUM: CPT

## 2020-06-07 PROCEDURE — 70498 CT ANGIOGRAPHY NECK: CPT

## 2020-06-07 PROCEDURE — 93005 ELECTROCARDIOGRAM TRACING: CPT | Performed by: EMERGENCY MEDICINE

## 2020-06-07 PROCEDURE — 84460 ALANINE AMINO (ALT) (SGPT): CPT | Performed by: EMERGENCY MEDICINE

## 2020-06-07 PROCEDURE — 70496 CT ANGIOGRAPHY HEAD: CPT

## 2020-06-07 PROCEDURE — 82803 BLOOD GASES ANY COMBINATION: CPT

## 2020-06-07 RX ORDER — ASPIRIN 300 MG/1
300 SUPPOSITORY RECTAL DAILY
Status: DISCONTINUED | OUTPATIENT
Start: 2020-06-08 | End: 2020-06-08 | Stop reason: HOSPADM

## 2020-06-07 RX ORDER — DULOXETIN HYDROCHLORIDE 60 MG/1
60 CAPSULE, DELAYED RELEASE ORAL DAILY
Status: DISCONTINUED | OUTPATIENT
Start: 2020-06-07 | End: 2020-06-08 | Stop reason: HOSPADM

## 2020-06-07 RX ORDER — BUDESONIDE 3 MG/1
3 CAPSULE, COATED PELLETS ORAL EVERY MORNING
Status: DISCONTINUED | OUTPATIENT
Start: 2020-06-08 | End: 2020-06-08 | Stop reason: HOSPADM

## 2020-06-07 RX ORDER — ASPIRIN 325 MG
325 TABLET ORAL DAILY
Status: DISCONTINUED | OUTPATIENT
Start: 2020-06-08 | End: 2020-06-08 | Stop reason: HOSPADM

## 2020-06-07 RX ORDER — NICOTINE 21 MG/24HR
1 PATCH, TRANSDERMAL 24 HOURS TRANSDERMAL
Status: DISCONTINUED | OUTPATIENT
Start: 2020-06-07 | End: 2020-06-08 | Stop reason: HOSPADM

## 2020-06-07 RX ORDER — ALBUTEROL SULFATE 90 UG/1
2 AEROSOL, METERED RESPIRATORY (INHALATION) EVERY 4 HOURS PRN
Status: DISCONTINUED | OUTPATIENT
Start: 2020-06-07 | End: 2020-06-08 | Stop reason: HOSPADM

## 2020-06-07 RX ORDER — SERTRALINE HYDROCHLORIDE 25 MG/1
25 TABLET, FILM COATED ORAL DAILY
Status: DISCONTINUED | OUTPATIENT
Start: 2020-06-07 | End: 2020-06-08 | Stop reason: HOSPADM

## 2020-06-07 RX ORDER — ATORVASTATIN CALCIUM 40 MG/1
80 TABLET, FILM COATED ORAL NIGHTLY
Status: DISCONTINUED | OUTPATIENT
Start: 2020-06-07 | End: 2020-06-08 | Stop reason: HOSPADM

## 2020-06-07 RX ORDER — BUSPIRONE HYDROCHLORIDE 10 MG/1
10 TABLET ORAL 3 TIMES DAILY PRN
Status: DISCONTINUED | OUTPATIENT
Start: 2020-06-07 | End: 2020-06-08 | Stop reason: HOSPADM

## 2020-06-07 RX ORDER — SODIUM CHLORIDE 0.9 % (FLUSH) 0.9 %
10 SYRINGE (ML) INJECTION EVERY 12 HOURS SCHEDULED
Status: DISCONTINUED | OUTPATIENT
Start: 2020-06-07 | End: 2020-06-08 | Stop reason: HOSPADM

## 2020-06-07 RX ORDER — SODIUM CHLORIDE 0.9 % (FLUSH) 0.9 %
10 SYRINGE (ML) INJECTION AS NEEDED
Status: DISCONTINUED | OUTPATIENT
Start: 2020-06-07 | End: 2020-06-08 | Stop reason: HOSPADM

## 2020-06-07 RX ORDER — PANTOPRAZOLE SODIUM 40 MG/1
40 TABLET, DELAYED RELEASE ORAL DAILY
Status: DISCONTINUED | OUTPATIENT
Start: 2020-06-08 | End: 2020-06-08 | Stop reason: HOSPADM

## 2020-06-07 RX ORDER — ATORVASTATIN CALCIUM 40 MG/1
80 TABLET, FILM COATED ORAL NIGHTLY
Status: DISCONTINUED | OUTPATIENT
Start: 2020-06-07 | End: 2020-06-07 | Stop reason: SDUPTHER

## 2020-06-07 RX ADMIN — SERTRALINE HYDROCHLORIDE 25 MG: 25 TABLET ORAL at 21:40

## 2020-06-07 RX ADMIN — ATORVASTATIN CALCIUM 80 MG: 40 TABLET, FILM COATED ORAL at 21:40

## 2020-06-07 RX ADMIN — DULOXETINE HYDROCHLORIDE 60 MG: 60 CAPSULE, DELAYED RELEASE ORAL at 21:40

## 2020-06-07 RX ADMIN — SODIUM CHLORIDE, PRESERVATIVE FREE 10 ML: 5 INJECTION INTRAVENOUS at 21:40

## 2020-06-07 RX ADMIN — IOPAMIDOL 115 ML: 755 INJECTION, SOLUTION INTRAVENOUS at 16:04

## 2020-06-07 NOTE — ED PROVIDER NOTES
Subjective   Mrs. Wade reports that she woke up with heaviness in her right arm today.  She notes that both legs from the knees down feel tingly and she is having trouble supporting her weight and tells me it feels like her legs want to buckle.  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  She tells me the stroke in March left her with left-sided weakness.  She notes also a visual disturbance today and tells me that when she was looking at the leaves on the trees everything seemed distorted.  She denies any recent illnesses although tells me she did have some chills yesterday.      History provided by:  Patient  Neurologic Problem   The patient's primary symptoms include focal weakness and weakness. This is a new problem. Episode onset: Woke up with it. Last Known Well Instant: Yesterday evening.  The problem is unchanged. There was right-sided and upper extremity focality noted. Associated symptoms include headaches. Pertinent negatives include no abdominal pain, chest pain, fever, nausea, shortness of breath or vomiting.       Review of Systems   Constitutional: Positive for chills. Negative for fever.   HENT: Negative for congestion and rhinorrhea.    Respiratory: Negative for shortness of breath.    Cardiovascular: Negative for chest pain.   Gastrointestinal: Negative for abdominal pain, nausea and vomiting.   Musculoskeletal: Positive for gait problem.   Neurological: Positive for focal weakness, weakness and headaches.   All other systems reviewed and are negative.      Past Medical History:   Diagnosis Date   • History of tonsillectomy 1951   • History of tubal ligation    • Hyperlipidemia        No Known Allergies    Past Surgical History:   Procedure Laterality Date   • CARDIOVASCULAR STRESS TEST  11/30/2017    L.Myoview- R/O Anterior Ischemia.   • CONVERTED (HISTORICAL) HOLTER  02/12/2020    AVG 77. . 6.8% PVC.  one 6 beats run of V-Tach   • ECHO - CONVERTED  11/30/2017    EF 65%. Mild-Mod MR   • INTERVENTIONAL RADIOLOGY PROCEDURE Bilateral 3/11/2020    Procedure: CAROTID CEREBRAL ANGIOGRAM BILATERAL;  Surgeon: Adryan Carter MD;  Location: [facility] INVASIVE LOCATION;  Service: Interventional Radiology;  Laterality: Bilateral;   • OTHER SURGICAL HISTORY  2015    CT Scan- 60% (L) ICA. 50% (R) ICA   • US CAROTID UNILATERAL  11/16/2017    @ [facility]. No sig stenosis       Family History   Problem Relation Age of Onset   • Hyperlipidemia Mother    • Hypertension Mother    • Stroke Mother    • Heart attack Father    • Aneurysm Father    • Alzheimer's disease Maternal Aunt    • Stroke Maternal Grandmother    • Stroke Maternal Grandfather        Social History     Socioeconomic History   • Marital status:      Spouse name: Not on file   • Number of children: Not on file   • Years of education: Not on file   • Highest education level: Not on file   Tobacco Use   • Smoking status: Current Every Day Smoker     Packs/day: 1.00     Years: 60.00     Pack years: 60.00   • Smokeless tobacco: Never Used   • Tobacco comment: patient counseled on effects of tobacco use on health   Substance and Sexual Activity   • Alcohol use: No   • Drug use: No           Objective   Physical Exam   Constitutional: She appears well-developed and well-nourished. No distress.   HENT:   Head: Normocephalic and atraumatic.   Eyes: Conjunctivae are normal. No scleral icterus.   Neck: Normal range of motion. Neck supple. No JVD present.   Cardiovascular: Normal rate and regular rhythm. Exam reveals no friction rub.   No murmur heard.  Pulmonary/Chest: Effort normal and breath sounds normal. She has no wheezes. She has no rales.   Abdominal: Soft. She exhibits no distension. There is no tenderness. There is no guarding.   Musculoskeletal: Normal range of motion. She exhibits no edema or tenderness.   Neurological: She is alert.   She is weak in  her right arm.   strength is weak, she has a drift, and finger-to-nose testing shows decreased coordination with the right upper extremity   Skin: Skin is warm and dry. Capillary refill takes less than 2 seconds.   Psychiatric: She has a normal mood and affect. Her behavior is normal.   Nursing note and vitals reviewed.      Critical Care  Performed by: Musa Magdaleno MD  Authorized by: Musa Magdaleno MD     Critical care provider statement:     Critical care time (minutes):  35    Critical care was necessary to treat or prevent imminent or life-threatening deterioration of the following conditions:  CNS failure or compromise    Critical care was time spent personally by me on the following activities:  Re-evaluation of patient's condition, review of old charts, examination of patient, ordering and review of radiographic studies and ordering and review of laboratory studies               ED Course  ED Course as of Jun 07 1847   Sun Jun 07, 2020   1601 I saw Mrs. Wade emergently in the CT scanner as the result of a code stroke page.  I interviewed her and examined her there.  I reviewed her CAT scan at bedside with the radiologist.  We see encephalomalacia from her recent hemorrhagic stroke but no acute problems and nothing that would account for her acute right-sided weakness.  We will proceed with aspirin.  She is not a candidate for IV TPA due to her recent intracranial hemorrhage as well last last known well being yesterday.  The representative from the interventional stroke team was present and agrees.  We will proceed with perfusion study and angiograms    [DT]   1714 Mrs. Wade is awake and alert.  I advised her findings thus far.  I think she has had a small stroke.  I have paged the hospitalist and will seek admission to the hospital.  She tells me she thinks she is on Xarelto but I do not see it listed on her medicine list and I think that would be risky given her history of recent  intracranial hemorrhage    [DT]   1723 Discussed with Dr. Rogers who will admit    [DT]      ED Course User Index  [DT] Musa Magdaleno MD                                           MDM  Number of Diagnoses or Management Options  Acute CVA (cerebrovascular accident) (CMS/HCC): new and requires workup     Amount and/or Complexity of Data Reviewed  Clinical lab tests: ordered and reviewed  Tests in the radiology section of CPT®: ordered and reviewed  Discussion of test results with the performing providers: yes  Review and summarize past medical records: yes  Discuss the patient with other providers: yes  Independent visualization of images, tracings, or specimens: yes    Patient Progress  Patient progress: improved      Final diagnoses:   Acute CVA (cerebrovascular accident) (CMS/HCC)            Musa Magdaleno MD  06/07/20 0629

## 2020-06-07 NOTE — H&P
[facility] Medicine Services  HISTORY AND PHYSICAL    Patient Name: Maria Luz Wade  : 1945  MRN: [id number]  Primary Care Physician: Sameera Davenport APRN  Date of admission: 2020      Subjective   Subjective     Chief Complaint:  Right arm heaviness and weakness with right leg weakness.    HPI:  Maria Luz Wade is a 74 y.o. 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  Patient reports that last night she just did not feel right she felt fatigued.  She developed a headache took some Tylenol and then went to bed.  She woke up and was able to get coffee but she did not feel right.  She states that she had blurry vision like she could not focus.  She then noticed that her right arm felt like it waited ton though she denied any weakness.  She also states that her legs from the knees down felt like were attached to her body and were weak and tingly.  She went to see her daughter who is concerned about her right arm so had her go to the emergency room.  There is also some concern for difficulty with word finding.  Patient with a recent history of a right MCA a mechanical thrombectomy, IV TPA with subsequent hemorrhage conversion, .  Patient had residual left-sided weakness.  She reports some improvement in her right-sided weakness.    Review of Systems   Gen- No fevers, chills (+) fatigue  CV- No chest pain, palpitations  Resp- No cough, dyspnea  GI- No N/V/D, abd pain  Neuro- (+) right arm and right leg weakness, heaviness, numbness.    All other systems reviewed and are negative.     Personal History     Past Medical History:   Diagnosis Date   • History of tonsillectomy    • History of tubal ligation    • Hyperlipidemia        Past Surgical History:   Procedure Laterality Date   • CARDIOVASCULAR  STRESS TEST  11/30/2017    L.Myoview- R/O Anterior Ischemia.   • CONVERTED (HISTORICAL) HOLTER  02/12/2020    AVG 77. . 6.8% PVC. one 6 beats run of V-Tach   • ECHO - CONVERTED  11/30/2017    EF 65%. Mild-Mod MR   • INTERVENTIONAL RADIOLOGY PROCEDURE Bilateral 3/11/2020    Procedure: CAROTID CEREBRAL ANGIOGRAM BILATERAL;  Surgeon: Adryan Carter MD;  Location: [facility] CATH INVASIVE LOCATION;  Service: Interventional Radiology;  Laterality: Bilateral;   • OTHER SURGICAL HISTORY  2015    CT Scan- 60% (L) ICA. 50% (R) ICA   • US CAROTID UNILATERAL  11/16/2017    @ [facility]. No sig stenosis       Family History: family history includes Alzheimer's disease in her maternal aunt; Aneurysm in her father; Heart attack in her father; Hyperlipidemia in her mother; Hypertension in her mother; Stroke in her maternal grandfather, maternal grandmother, and mother. Otherwise pertinent FHx was reviewed and unremarkable.     Social History:  reports that she has been smoking. She has a 60.00 pack-year smoking history. She has never used smokeless tobacco. She reports that she does not drink alcohol or use drugs.  Social History     Social History Narrative   • Not on file       Medications:    Available home medication information reviewed.    No Known Allergies    Objective   Objective     Vital Signs:   Temp:  [97.7 °F (36.5 °C)] 97.7 °F (36.5 °C)  Heart Rate:  [85-94] 86  Resp:  [16] 16  BP: (106-136)/(73-88) 106/73   Total (NIH Stroke Scale): 3    Physical Exam   Constitutional: Awake, alert  Eyes: PERRLA, sclerae anicteric, no conjunctival injection  HENT: NCAT, mucous membranes moist  Neck: Supple, no thyromegaly, no lymphadenopathy, trachea midline  Respiratory: Clear to auscultation bilaterally, nonlabored respirations   Cardiovascular: RRR, no murmurs, rubs, or gallops, palpable pedal pulses bilaterally  Gastrointestinal: Positive bowel sounds, soft, nontender, nondistended  Musculoskeletal: No bilateral ankle  edema, no clubbing or cyanosis to extremities  Psychiatric: Appropriate affect, cooperative  Neurologic: Cranial nerves II through XII intact.     Motor: Decreased strength in the right arm, left arm.  Positive for right pronator drift.  Decreased strength in the right leg compared to the left leg.    Sensation: Decreased sensation in the right arm and the right leg    Results Reviewed:  I have personally reviewed current lab and radiology data.    Results from last 7 days   Lab Units 06/07/20  1602 06/07/20  1600 06/07/20  1557   WBC 10*3/mm3  --  10.80  --    HEMOGLOBIN g/dL  --  14.3  --    HEMOGLOBIN, POC g/dL 14.3  --   --    HEMATOCRIT %  --  44.0  --    HEMATOCRIT POC % 42  --   --    PLATELETS 10*3/mm3  --  359  --    INR   --   --  1.0     Results from last 7 days   Lab Units 06/07/20  1600 06/07/20  1557   CREATININE mg/dL  --  1.00   ALT (SGPT) U/L 8  --    AST (SGOT) U/L 25  --    TROPONIN T ng/mL <0.010  --      Estimated Creatinine Clearance: 37.5 mL/min (by C-G formula based on SCr of 1 mg/dL).  Brief Urine Lab Results     None        Imaging Results (Last 24 Hours)     Procedure Component Value Units Date/Time    XR Chest 1 View [[id number]] Collected:  06/07/20 1722     Updated:  06/07/20 1741    Narrative:       EXAMINATION: XR CHEST 1 VW - 06/07/2020     INDICATION: Evaluate for stroke. I63.9-Cerebral infarction, unspecified.  Stroke protocol.     COMPARISON: 03/11/2020     FINDINGS: Heart, mediastinum and pulmonary vasculature appear within  normal limits. Lungs appear well-expanded and clear. No edema, effusion  or pneumothorax is seen.       Impression:       No evidence of active chest disease.     DICTATED:   06/07/2020  EDITED/ls :   06/07/2020            CT Angiogram Head [[id number]] Collected:  06/07/20 1659     Updated:  06/07/20 1711    Narrative:       EXAMINATION: CT ANGIOGRAM HEAD - 06/07/2020     INDICATION: Evaluate for stroke. I63.9-Cerebral infarction, unspecified.     TECHNIQUE:  Spiral acquisition 3 mm and 0.75 mm axial images through the  brain with sagittal and coronal 2 mm 2D reconstructions and 3D VRT and  MIP angiographic reconstructions.     The radiation dose reduction device was turned on for each scan per the  ALARA (As Low as Reasonably Achievable) protocol.     COMPARISON: NONE     FINDINGS: Intracranial portions of the right and left internal carotid  arteries show no evidence of hemodynamically significant stenosis. The  anterior middle cerebral arteries appear within normal limits, with no  evidence of significant focal stenosis. Coronal images give the  appearance of mild proximal narrowing of a right M2 branch, not present  on sagittal reconstruction images and this appears to represent volume  averaging with the area of tortuosity. Distal vertebral arteries are  asymmetric, dominant left and much smaller right without obvious focal  stenosis. Basilar artery is normal in diameter. Posterior cerebral  arteries are only seen well proximally, but no significant stenosis is  seen. There is a moderate-sized left and smaller right posterior  communicating artery.       Impression:       No evidence of hemodynamically significant intracranial  vascular stenosis.     DICTATED:   06/07/2020  EDITED/ls :   06/07/2020        CT Angiogram Neck [[id number]] Collected:  06/07/20 1651     Updated:  06/07/20 1709    Narrative:       EXAMINATION: CT ANGIOGRAM NECK - 06/07/2020     INDICATION: Evaluate for stroke. I63.9-Cerebral infarction, unspecified.     TECHNIQUE: Pre and postcontrast 3 mm and 0.75 mm axial images through  the neck with sagittal and coronal 2D reconstructions and 3D VRT and MIP  angiographic reconstructions.     The radiation dose reduction device was turned on for each scan per the  ALARA (As Low as Reasonably Achievable) protocol.     COMPARISON: NONE     FINDINGS: There is common origin of the innominate and left common  carotid. Proximal subclavian arteries show no  evidence of significant  stenosis.     On the right, no significant common carotid stenosis is seen. There is  minimal plaquing of the ICA origin with no evidence of significant ICA  stenosis to the level of the skull base, or significant ECA stenosis.     On the left, there is a partly calcified proximal ICA plaque, with  probably 30% or less narrowing. No common carotid stenosis is seen and  no ICA stenosis is seen elsewhere. External carotid artery appears  grossly normal.     Left vertebral artery is dominant. No evidence of significant vertebral  artery stenosis is appreciated.     No evidence of significant incidental soft tissue neck pathology is  appreciated.       Impression:       Mildly calcified right ICA origin plaque, and incompletely calcified  left ICA origin plaque with no evidence of hemodynamically significant  stenosis at either site. No evidence of significant carotid or vertebral  artery stenosis is seen elsewhere in the neck.     DICTATED:   06/07/2020  EDITED/ls :   06/07/2020        CT Head Without Contrast Stroke Protocol [[id number]] Collected:  06/07/20 1629     Updated:  06/07/20 1703    Narrative:       EXAMINATION: CT HEAD WO CONTRAST  - 06/07/2020     INDICATION: Right arm weakness. Evaluate for stroke.     TECHNIQUE: 5 mm unenhanced images through the brain. Stroke protocol.     The radiation dose reduction device was turned on for each scan per the  ALARA (As Low as Reasonably Achievable) protocol.     COMPARISON: 03/16/2020     FINDINGS: Previous study showed a large right temporal hemorrhage. This  has resolved, and there is expected encephalomalacia at the old  hemorrhage site. There is no evidence of new edema/infarct, new  hemorrhage, or other new intracranial disease. Punctate low-density area  in the anterior limb of left internal capsule is better seen than on the  prior study, but appears to have been present, perhaps an old lacunar  infarct No hydrocephalus, mass or mass  effect or abnormal extra-axial  collection is seen. Calvarium appears intact.       Impression:       Residual right frontotemporal encephalomalacia from  patient's previous intracranial hemorrhage. No evidence of acute  infarction or other clearly acute intracranial disease is seen.     Note: Exam time is shown as 3:52 PM. Study was reviewed and discussed  with Dr. Magdaleno at approximately 3:57 PM.     DICTATED:   06/07/2020  EDITED/ls :   06/07/2020           CT Cerebral Perfusion With & Without Contrast [[id number]] Collected:  06/07/20 1634     Updated:  06/07/20 1649    Narrative:       EXAMINATION: CT CEREBRAL PERFUSION WWO CONTRAST - 06/07/2020     INDICATION: TIA. I63.9-Cerebral infarction, unspecified.     TECHNIQUE: Cerebral perfusion analysis was performed using computed  tomography with contrast administration including postprocessing of  parametric maps with determination of cerebral blood flow, cerebral  blood volume and mean transit time.     The radiation dose reduction device was turned on for each scan per the  ALARA (As Low as Reasonably Achievable) protocol.     COMPARISON: Unenhanced head CT scan of same date. Cerebral perfusion  exam 03/11/2020     FINDINGS: Rapid analysis today shows small area of diminished cerebral  blood flow, less than 30% along the posterior margin of patient's old  right MCA territory infarct. There is slightly smaller area of T-max  greater than six seconds corresponding to the same location. No evidence  of large vessel occlusion is seen. Individual perfusion maps show a  somewhat larger area, mostly in the white matter, of increased mean  transit time and time to drain around the old infarct/encephalomalacia,  corresponding area of decreased cerebral blood flow throughout the  region, and smaller area of decreased cerebral blood volume. Overall,  these findings could reflect a new or chronic ischemia along the  posterior margin old right infarct/encephalomalacia  territory, however,  this is apparently contralateral to the patient's current symptoms. No  evidence of potential ischemia/infarct is seen elsewhere.       Impression:       Right frontotemporal encephalomalacia as a result of  patient's previous intracranial hemorrhage. Rapid analysis and perfusion  mapping suggest a focal area of ischemia along the dorsal margin of the  old area of encephalomalacia/infarct. This does not, however, apparently  correlate with patient's current right-sided symptoms. No evidence of  large vessel occlusion.     DICTATED:   06/07/2020  EDITED/ls :   06/07/2020            Results for orders placed during the hospital encounter of 03/11/20   Adult Transthoracic Echo Complete W/ Cont if Necessary Per Protocol (With Agitated Saline)    Narrative · Left ventricular systolic function is hyperdynamic (EF > 70).  · Left ventricular diastolic dysfunction (grade I a) consistent with   impaired relaxation.  · Mild aortic valve regurgitation is present.  · Mild mitral valve regurgitation is present  · Mild tricuspid valve regurgitation is present.  · Saline test results are negative.          Assessment/Plan   Assessment / Plan     Active Hospital Problems    Diagnosis POA   • Suspected cerebrovascular accident (CVA) [R09.89] Unknown   • Essential hypertension [I10] Unknown     Hospital Course:  Maria Luz Wade is a 74 y.o. 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  Patient with a recent history of a right MCA a mechanical thrombectomy, IV TPA with subsequent hemorrhage conversion, 2020.  Patient had residual left-sided weakness that has improved. CT scan of head no contrast: No acute changes.  CT perfusion: No evidence of LVO.    Right-sided weakness  -Patient with right-sided neurological symptoms.  On exam she does have some  right arm weakness.  - permissive hypertension  -Neurology consult  -CTA of the head neck  -MRI of the head  -A1c, lipid  -holding Further studies as patient received a full work-up March 2020.  -Continue aspirin, continue Lipitor  -PT/OT    Hypertension  -Holding hypertensive medications to allow for permissive hypertension due to possible CVA    Smoking cessation  -Continue patch and education    GERD: Continue Protonix    Mood disorder: Continue Cymbalta, Zoloft      DVT prophylaxis: compression device    CODE STATUS:    There are no questions and answers to display.       Admission Status:  I believe this patient meets OBSERVATION status, however if further evaluation or treatment plans warrant, status may change.  Based upon current information, I predict patient's care encounter to be less than or equal to 2 midnights.      Electronically signed by PATRICK Dunlap, 06/07/20, 6:23 PM.      Attending   Admission Attestation       I have seen and examined the patient, performing an independent face-to-face diagnostic evaluation with plan of care reviewed and developed with the advanced practice clinician (APC).      Brief Summary Statement:   Maria Luz Wade is a 74 y.o. 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  Patient reports that last night she just did not feel right she felt fatigued.  She developed a headache took some Tylenol and then went to bed.  She woke up and was able to get coffee but she did not feel right.  She states that she had blurry vision like she could not focus.  She then noticed that her right arm felt like it waited ton though she denied any weakness.  She also states that her legs from the knees down felt like were attached to her body and were weak and tingly.  She went to see her daughter who is concerned  about her right arm so had her go to the emergency room.  There is also some concern for difficulty with word finding.  Patient with a recent history of a right MCA a mechanical thrombectomy, IV TPA with subsequent hemorrhage conversion, 2020.  Patient had residual left-sided weakness.  She reports some improvement in her right-sided weakness.    On exam, patient does have some right arm weakness.  She has no facial droop.  CT head with residual right frontotemporal encephalomalacia from prior intracranial hemorrhage.  CT perfusion suggested a focal area of ischemia along the dorsal margin of the old area of encephalomalacia/infarct, this does not correlate with the patient's current right-sided symptoms.  No evidence of large vessel occlusion.  CTA head and neck negative for significant stenosis.  MRI pending.  We will continue ASA, statin.  Neurology consult in the a.m.  We will have PT/OT evaluate.  Permissive hypertension for now due to concern for stroke.    Remainder of detailed HPI is as noted by APC and has been reviewed and/or edited by me for completeness.    Attending Physical Exam:  Constitutional: Awake, alert, no acute distress, pleasant  Eyes: PERRLA, sclerae anicteric, no conjunctival injection  HENT: NCAT, mucous membranes moist  Neck: Supple, trachea midline  Respiratory: Clear to auscultation bilaterally, nonlabored respirations   Cardiovascular: RRR, no murmurs, rubs, or gallops, palpable pedal pulses bilaterally  Gastrointestinal: Positive bowel sounds, soft, nontender, nondistended  Musculoskeletal: No bilateral ankle edema, no clubbing or cyanosis to extremities  Psychiatric: Appropriate affect, cooperative  Neurologic: Oriented x 3, right upper extremity weakness, symmetric 4 out of 5 strength in bilateral lower extremities, 4 out of 5 strength in left upper extremity, Cranial Nerves grossly intact to confrontation, speech clear, no facial droop  Skin: No rashes      Brief Assessment/Plan  :  See detailed assessment and plan developed with APC which I have reviewed and/or edited for completeness.    Electronically signed by Nancy Rogers MD, 06/07/20, 11:11 PM.

## 2020-06-07 NOTE — CONSULTS
Stroke Consult Note    Patient Name: Maria Luz Wade   MRN: [id number]  Age: 74 y.o.  Sex: female  : 1945    Primary Care Physician: Sameera Davenport APRN  Referring Physician:  No ref. provider found    TIME STROKE TEAM CALLED: 1600 EST     TIME PATIENT SEEN: 1605 EST    Handedness: right   Race:     Chief Complaint/Reason for Consultation: Right arm and leg weakness    HPI:   Maria Luz Wade is a 74-year-old female that presents to [facility] ED via private transportation from [facility] to be evaluated for new onset right arm heaviness/weakness and right leg weakness.  Of note her daughter drove her from [place].  Patient has a recent history of right MCA mechanical thrombectomy, IV TPA and subsequent hemorrhagic conversion in 2020.  Immediately following CVA in March patient does report that she had some left-sided weakness which has since improved.  Since discharge from this hospitalization patient is doing very well and continues to live at home performing all ADLs independently without assistance.  She lives in an apartment in her daughter's basement.  She has recently been walking approximately 1 mile per day when able.  This morning patient notes around 10 or 10:30 AM she had a heaviness in her right shoulder and arm accompanied by some tingling.  She also reports that her legs felt like they were going to buckle.  On exam right leg weakness compared to left leg is appreciated but not reported by patient    Last Known Normal Date/Time:  EST     Review of Systems   Constitutional: Positive for fatigue.   Neurological: Positive for weakness and numbness.        Patient reports right arm weakness/heaviness   All other systems reviewed and are negative.       Temp:  [97.7 °F (36.5 °C)] 97.7 °F (36.5 °C)  Heart Rate:  [85-94] 89  Resp:  [16] 16  BP: (119-136)/(74-88) 128/88    Neurological Exam  Mental Status  Awake and alert. Oriented to person, place, time and situation.  Speech is normal. Language is fluent with no aphasia.    Cranial Nerves  CN II: Visual acuity is normal. Visual fields full to confrontation.  CN III, IV, VI: Extraocular movements intact bilaterally. Normal lids and orbits bilaterally. Pupils equal round and reactive to light bilaterally.  CN V: Facial sensation is normal.  CN VII: Full and symmetric facial movement.  CN VIII: Hearing is normal.  CN IX, X: Palate elevates symmetrically. Normal gag reflex.  CN XI: Shoulder shrug strength is normal.  CN XII: Tongue midline without atrophy or fasciculations.    Motor   No fasciculations present. Normal muscle tone. No abnormal involuntary movements. Right pronator drift.  Slight right upper and lower extremity drift.    Sensory  Light touch abnormality:   Patient reports mild tingling in her right upper extremity.    Coordination  Right: Finger-to-nose normal. Heel-to-shin normal.  Left: Finger-to-nose normal. Heel-to-shin normal.    Gait  Casual gait is normal including stance, stride, and arm swing.      Physical Exam   HENT:   Head: Normocephalic and atraumatic.   Eyes: Pupils are equal, round, and reactive to light. Lids are normal.   Neck: Normal range of motion. Neck supple.   Pulmonary/Chest: Effort normal.   Musculoskeletal: Normal range of motion.   Neurological: She is alert.   Skin: Skin is warm and dry.   Psychiatric: She has a normal mood and affect. Her speech is normal.       Acute Stroke Data    Alteplase (tPA) Inclusion / Exclusion Criteria    Time: 17:43  Person Administering Scale: PATRICK Tidwell    Inclusion Criteria  [x]   18 years of age or greater   []   Onset of symptoms < 4.5 hours before beginning treatment (stroke onset = time patient was last seen well or without symptoms).   []   Diagnosis of acute ischemic stroke causing measurable disabling deficit (Complete Hemianopia, Any Aphasia, Visual or Sensory Extinction, Any weakness limiting sustained effort against gravity)   []   Any  remaining deficit considered potentially disabling in view of patient and practitioner   Exclusion criteria (Do not proceed with Alteplase if any are checked under exclusion criteria)  [x]   Onset unknown or GREATER than 4.5 hours   []   ICH on CT/MRI   []   CT demonstrates hypodensity representing acute or subacute infarct   []   Significant head trauma or prior stroke in the previous 3 months   []   Symptoms suggestive of subarachnoid hemorrhage   []   History of un-ruptured intracranial aneurysm GREATER than 10 mm   []   Recent intracranial or intraspinal surgery within the last 3 months   []   Arterial puncture at a non-compressible site in the previous 7 days   []   Active internal bleeding   []   Acute bleeding tendency   []   Platelet count LESS than 100,000 for known hematological diseases such as leukemia, thrombocytopenia or chronic cirrhosis   []   Current use of anticoagulant with INR GREATER than 1.7 or PT GREATER than 15 seconds, aPTT GREATER than 40 seconds   []   Heparin received within 48 hours, resulting in abnormally elevated aPTT GREATER than upper limit of normal   []   Current use of direct thrombin inhibitors or direct factor Xa inhibitors in the past 48 hours   []   Elevated blood pressure refractory to treatment (systolic GREATER than 185 mm/Hg or diastolic  GREATER than 110 mm/Hg   []   Suspected infective endocarditis and aortic arch dissection   []   Current use of therapeutic treatment dose of low-molecular-weight heparin (LMWH) within the previous 24 hours   []   Structural GI malignancy or bleed   Relative exclusion for all patients  []   Only minor non-disabling symptoms   []   Pregnancy   []   Seizure at onset with postictal residual neurological impairments   []   Major surgery or previous trauma within past 14 days   []   History of previous spontaneous ICH, intracranial neoplasm, or AV malformation   []   Postpartum (within previous 14 days)   []   Recent GI or urinary tract  hemorrhage (within previous 21 days)   []   Recent acute MI (within previous 3 months)   []   History of un-ruptured intracranial aneurysm LESS than 10 mm   []   History of ruptured intracranial aneurysm   []   Blood glucose LESS than 50 mg/dL (2.7 mmol/L)   []   Dural puncture within the last 7 days   []   Known GREATER than 10 cerebral microbleeds   Additional exclusions for patients with symptoms onset between 3 and 4.5 hours.  []   Age > 80.   []   On any anticoagulants regardless of INR  >>> Warfarin (Coumadin), Heparin, Enoxaparin (Lovenox), fondaparinux (Arixtra), bivalirudin (Angiomax), Argatroban, dabigatran (Pradaxa), rivaroxaban (Xarelto), or apixaban (Eliquis)   []   Severe stroke (NIHSS > 25).   []   History of BOTH diabetes and previous ischemic stroke.   []   The risks and benefits have been discussed with the patient or family related to the administration of IV Alteplase for stroke symptoms.   []   I have discussed and reviewed the patient's case and imaging with the attending prior to IV Alteplase.   Not given Time Alteplase administered       Past Medical History:   Diagnosis Date   • History of tonsillectomy 1951   • History of tubal ligation    • Hyperlipidemia      Past Surgical History:   Procedure Laterality Date   • CARDIOVASCULAR STRESS TEST  11/30/2017    L.Myoview- R/O Anterior Ischemia.   • CONVERTED (HISTORICAL) HOLTER  02/12/2020    AVG 77. . 6.8% PVC. one 6 beats run of V-Tach   • ECHO - CONVERTED  11/30/2017    EF 65%. Mild-Mod MR   • INTERVENTIONAL RADIOLOGY PROCEDURE Bilateral 3/11/2020    Procedure: CAROTID CEREBRAL ANGIOGRAM BILATERAL;  Surgeon: Adryan Carter MD;  Location: [facility] INVASIVE LOCATION;  Service: Interventional Radiology;  Laterality: Bilateral;   • OTHER SURGICAL HISTORY  2015    CT Scan- 60% (L) ICA. 50% (R) ICA   • US CAROTID UNILATERAL  11/16/2017    @ [facility]. No sig stenosis     Family History   Problem Relation Age of Onset   •  Hyperlipidemia Mother    • Hypertension Mother    • Stroke Mother    • Heart attack Father    • Aneurysm Father    • Alzheimer's disease Maternal Aunt    • Stroke Maternal Grandmother    • Stroke Maternal Grandfather      Social History     Socioeconomic History   • Marital status:      Spouse name: Not on file   • Number of children: Not on file   • Years of education: Not on file   • Highest education level: Not on file   Tobacco Use   • Smoking status: Current Every Day Smoker     Packs/day: 1.00     Years: 60.00     Pack years: 60.00   • Smokeless tobacco: Never Used   • Tobacco comment: patient counseled on effects of tobacco use on health   Substance and Sexual Activity   • Alcohol use: No   • Drug use: No     No Known Allergies  Prior to Admission medications    Medication Sig Start Date End Date Taking? Authorizing Provider   acetaminophen (TYLENOL) 325 MG tablet Take 2 tablets by mouth Every 4 (Four) Hours As Needed for Mild Pain . 3/16/20   Justice Napier DO   albuterol sulfate  (90 Base) MCG/ACT inhaler Inhale 2 puffs Every 4 (Four) Hours As Needed for Wheezing.    Georgia Clark MD   amLODIPine (NORVASC) 5 MG tablet Take 5 mg by mouth 2 (Two) Times a Day.    Georgia Clark MD   atorvastatin (LIPITOR) 80 MG tablet Take 1 tablet by mouth Every Night. 3/16/20   Justice Napier DO   Budesonide (ENTOCORT EC) 3 MG 24 hr capsule Take 3 mg by mouth Every Morning.    Georgia Clark MD   busPIRone (BUSPAR) 10 MG tablet Take 10 mg by mouth 3 (Three) Times a Day As Needed.    Georgia Clark MD   Cholecalciferol (VITAMIN D) 125 MCG (5000 UT) capsule capsule Take 5,000 Units by mouth Daily.    Georgia Clark MD   DULoxetine (CYMBALTA) 60 MG capsule Take 60 mg by mouth 2 (Two) Times a Day.    Georgia Clark MD   glucosamine-chondroitin 500-400 MG capsule capsule Take  by mouth 2 (Two) Times a Day With Meals.    Georgia Clark MD   loratadine  (CLARITIN) 10 MG tablet Take 10 mg by mouth Daily.    ProviderGeorgia MD   metoprolol tartrate (LOPRESSOR) 25 MG tablet Take 1 tablet by mouth Every 12 (Twelve) Hours. 3/16/20   Justice Napier DO   nicotine (NICODERM CQ) 21 MG/24HR patch Place 1 patch on the skin as directed by provider Daily. 3/17/20   Justice Napier DO   pantoprazole (PROTONIX) 40 MG EC tablet Take 40 mg by mouth Daily.    ProviderGeorgia MD   sertraline (ZOLOFT) 25 MG tablet Take 25 mg by mouth Daily.    Georgia Clark MD   vitamin B-12 (CYANOCOBALAMIN) 1000 MCG tablet Take 2,000 mcg by mouth Daily.    ProviderGeorgia MD       Hospital Meds:  Scheduled-    Infusions-     PRNs- •  sodium chloride    Functional Status Prior to Current Stroke/Esthela Score: 0-1    NIH Stroke Scale  Time: 17:43  Person Administering Scale: PATRICK Tidwell    1a  Level of consciousness: 0=alert; keenly responsive   1b. LOC questions:  0=Performs both tasks correctly   1c. LOC commands: 0=Performs both tasks correctly   2.  Best Gaze: 0=normal   3.  Visual: 0=No visual loss   4. Facial Palsy: 0=Normal symmetric movement   5a.  Motor left arm: 0=No drift, limb holds 90 (or 45) degrees for full 10 seconds   5b.  Motor right arm: 1=Drift, limb holds 90 (or 45) degrees but drifts down before full 10 seconds: does not hit bed   6a. motor left le=No drift, limb holds 90 (or 45) degrees for full 10 seconds   6b  Motor right le=Drift, limb holds 90 (or 45) degrees but drifts down before full 10 seconds: does not hit bed   7. Limb Ataxia: 1=Present in one limb   8.  Sensory: 0=Normal; no sensory loss   9. Best Language:  0=No aphasia, normal   10. Dysarthria: 0=Normal   11. Extinction and Inattention: 0=No abnormality    Total:   3       Results Reviewed:  I have personally reviewed current lab, radiology, and data and agree with results.  Lab Results (last 24 hours)     Procedure Component Value Units Date/Time     Troponin [[id number]]  (Normal) Collected:  06/07/20 1600    Specimen:  Blood from Arm, Right Updated:  06/07/20 1707     Troponin T <0.010 ng/mL      Comment: Specimen hemolyzed.  Results may be affected.       Narrative:       Troponin T Reference Range:  <= 0.03 ng/mL-   Negative for AMI  >0.03 ng/mL-     Abnormal for myocardial necrosis.  Clinicians would have to utilize clinical acumen, EKG, Troponin and serial changes to determine if it is an Acute Myocardial Infarction or myocardial injury due to an underlying chronic condition.       Results may be falsely decreased if patient taking Biotin.      AST [[id number]]  (Normal) Collected:  06/07/20 1600    Specimen:  Blood from Arm, Right Updated:  06/07/20 1707     AST (SGOT) 25 U/L     ALT [[id number]]  (Normal) Collected:  06/07/20 1600    Specimen:  Blood from Arm, Right Updated:  06/07/20 1707     ALT (SGPT) 8 U/L      Comment: Specimen hemolyzed.  Results may be affected.       aPTT [[id number]]  (Normal) Collected:  06/07/20 1600    Specimen:  Blood from Arm, Right Updated:  06/07/20 1704     PTT 30.4 seconds     Narrative:       PTT = The equivalent PTT values for the therapeutic range of heparin levels at 0.3 to 0.5 U/ml are 55 to 70 seconds.    [place] Draw [[id number]] Collected:  06/07/20 1600    Specimen:  Blood from Arm, Right Updated:  06/07/20 1701    Narrative:       The following orders were created for panel order [place] Draw.  Procedure                               Abnormality         Status                     ---------                               -----------         ------                     Light Blue Top[[id number]]                                   Final result               Green Top (Gel)[[id number]]                                  Final result               Lavender Top[[id number]]                                     Final result               Gold Top - SST[[id number]]                                   Final result                 Please  view results for these tests on the individual orders.    Light Blue Top [[id number]] Collected:  06/07/20 1600    Specimen:  Blood from Arm, Right Updated:  06/07/20 1701     Extra Tube hold for add-on     Comment: Auto resulted       Green Top (Gel) [[id number]] Collected:  06/07/20 1600    Specimen:  Blood from Arm, Right Updated:  06/07/20 1701     Extra Tube Hold for add-ons.     Comment: Auto resulted.       Lavender Top [[id number]] Collected:  06/07/20 1600    Specimen:  Blood from Arm, Right Updated:  06/07/20 1701     Extra Tube hold for add-on     Comment: Auto resulted       Gold Top - SST [[id number]] Collected:  06/07/20 1600    Specimen:  Blood from Arm, Right Updated:  06/07/20 1701     Extra Tube Hold for add-ons.     Comment: Auto resulted.       CBC & Differential [[id number]] Collected:  06/07/20 1600    Specimen:  Blood from Arm, Right Updated:  06/07/20 1644    Narrative:       The following orders were created for panel order CBC & Differential.  Procedure                               Abnormality         Status                     ---------                               -----------         ------                     CBC Auto Differential[[id number]]        Abnormal            Final result                 Please view results for these tests on the individual orders.    CBC Auto Differential [[id number]]  (Abnormal) Collected:  06/07/20 1600    Specimen:  Blood from Arm, Right Updated:  06/07/20 1644     WBC 10.80 10*3/mm3      RBC 4.72 10*6/mm3      Hemoglobin 14.3 g/dL      Hematocrit 44.0 %      MCV 93.2 fL      MCH 30.3 pg      MCHC 32.5 g/dL      RDW 13.3 %      RDW-SD 45.8 fl      MPV 11.6 fL      Platelets 359 10*3/mm3      Neutrophil % 59.3 %      Lymphocyte % 28.8 %      Monocyte % 9.3 %      Eosinophil % 0.1 %      Basophil % 1.7 %      Immature Grans % 0.8 %      Neutrophils, Absolute 6.41 10*3/mm3      Lymphocytes, Absolute 3.11 10*3/mm3      Monocytes, Absolute 1.00 10*3/mm3       Eosinophils, Absolute 0.01 10*3/mm3      Basophils, Absolute 0.18 10*3/mm3      Immature Grans, Absolute 0.09 10*3/mm3      nRBC 0.0 /100 WBC     POC Creatinine [[id number]]  (Normal) Collected:  06/07/20 1557    Specimen:  Blood Updated:  06/07/20 1616     Creatinine 1.00 mg/dL      Comment: Serial Number: [id number]:  [id number]       POC Surgery Labs [[id number]]  (Abnormal) Collected:  06/07/20 1602    Specimen:  Blood Updated:  06/07/20 1606     Ionized Calcium 1.20 mmol/L      POC Potassium 4.9 mmol/L      Sodium 139 mmol/L      Total CO2 26 mmol/L      Hemoglobin 14.3 g/dL      Hematocrit 42 %      pCO2, Arterial 37.8 mm Hg      pO2, Arterial 36 mmHg      Comment: Serial Number: [id number]:  [id number]        Base Excess 1.0000 mmol/L      O2 Saturation, Arterial 71 %      pH, Arterial 7.43 pH units      HCO3, Arterial 25.1 mmol/L     POC Protime / INR [[id number]]  (Abnormal) Collected:  06/07/20 1557    Specimen:  Blood Updated:  06/07/20 1600     Protime 12.0 seconds      INR 1.0     Comment: Serial Number: [id number]:  [id number]           Imaging Results (Last 24 Hours)     Procedure Component Value Units Date/Time    XR Chest 1 View [[id number]] Collected:  06/07/20 1722     Updated:  06/07/20 1741    Narrative:       EXAMINATION: XR CHEST 1 VW - 06/07/2020     INDICATION: Evaluate for stroke. I63.9-Cerebral infarction, unspecified.  Stroke protocol.     COMPARISON: 03/11/2020     FINDINGS: Heart, mediastinum and pulmonary vasculature appear within  normal limits. Lungs appear well-expanded and clear. No edema, effusion  or pneumothorax is seen.       Impression:       No evidence of active chest disease.     DICTATED:   06/07/2020  EDITED/ls :   06/07/2020            CT Angiogram Head [[id number]] Collected:  06/07/20 1659     Updated:  06/07/20 1711    Narrative:       EXAMINATION: CT ANGIOGRAM HEAD - 06/07/2020     INDICATION: Evaluate for stroke. I63.9-Cerebral infarction, unspecified.     TECHNIQUE:  Spiral acquisition 3 mm and 0.75 mm axial images through the  brain with sagittal and coronal 2 mm 2D reconstructions and 3D VRT and  MIP angiographic reconstructions.     The radiation dose reduction device was turned on for each scan per the  ALARA (As Low as Reasonably Achievable) protocol.     COMPARISON: NONE     FINDINGS: Intracranial portions of the right and left internal carotid  arteries show no evidence of hemodynamically significant stenosis. The  anterior middle cerebral arteries appear within normal limits, with no  evidence of significant focal stenosis. Coronal images give the  appearance of mild proximal narrowing of a right M2 branch, not present  on sagittal reconstruction images and this appears to represent volume  averaging with the area of tortuosity. Distal vertebral arteries are  asymmetric, dominant left and much smaller right without obvious focal  stenosis. Basilar artery is normal in diameter. Posterior cerebral  arteries are only seen well proximally, but no significant stenosis is  seen. There is a moderate-sized left and smaller right posterior  communicating artery.       Impression:       No evidence of hemodynamically significant intracranial  vascular stenosis.     DICTATED:   06/07/2020  EDITED/ls :   06/07/2020        CT Angiogram Neck [[id number]] Collected:  06/07/20 1651     Updated:  06/07/20 1709    Narrative:       EXAMINATION: CT ANGIOGRAM NECK - 06/07/2020     INDICATION: Evaluate for stroke. I63.9-Cerebral infarction, unspecified.     TECHNIQUE: Pre and postcontrast 3 mm and 0.75 mm axial images through  the neck with sagittal and coronal 2D reconstructions and 3D VRT and MIP  angiographic reconstructions.     The radiation dose reduction device was turned on for each scan per the  ALARA (As Low as Reasonably Achievable) protocol.     COMPARISON: NONE     FINDINGS: There is common origin of the innominate and left common  carotid. Proximal subclavian arteries show no  evidence of significant  stenosis.     On the right, no significant common carotid stenosis is seen. There is  minimal plaquing of the ICA origin with no evidence of significant ICA  stenosis to the level of the skull base, or significant ECA stenosis.     On the left, there is a partly calcified proximal ICA plaque, with  probably 30% or less narrowing. No common carotid stenosis is seen and  no ICA stenosis is seen elsewhere. External carotid artery appears  grossly normal.     Left vertebral artery is dominant. No evidence of significant vertebral  artery stenosis is appreciated.     No evidence of significant incidental soft tissue neck pathology is  appreciated.       Impression:       Mildly calcified right ICA origin plaque, and incompletely calcified  left ICA origin plaque with no evidence of hemodynamically significant  stenosis at either site. No evidence of significant carotid or vertebral  artery stenosis is seen elsewhere in the neck.     DICTATED:   06/07/2020  EDITED/ls :   06/07/2020        CT Head Without Contrast Stroke Protocol [[id number]] Collected:  06/07/20 1629     Updated:  06/07/20 1703    Narrative:       EXAMINATION: CT HEAD WO CONTRAST  - 06/07/2020     INDICATION: Right arm weakness. Evaluate for stroke.     TECHNIQUE: 5 mm unenhanced images through the brain. Stroke protocol.     The radiation dose reduction device was turned on for each scan per the  ALARA (As Low as Reasonably Achievable) protocol.     COMPARISON: 03/16/2020     FINDINGS: Previous study showed a large right temporal hemorrhage. This  has resolved, and there is expected encephalomalacia at the old  hemorrhage site. There is no evidence of new edema/infarct, new  hemorrhage, or other new intracranial disease. Punctate low-density area  in the anterior limb of left internal capsule is better seen than on the  prior study, but appears to have been present, perhaps an old lacunar  infarct No hydrocephalus, mass or mass  effect or abnormal extra-axial  collection is seen. Calvarium appears intact.       Impression:       Residual right frontotemporal encephalomalacia from  patient's previous intracranial hemorrhage. No evidence of acute  infarction or other clearly acute intracranial disease is seen.     Note: Exam time is shown as 3:52 PM. Study was reviewed and discussed  with Dr. Magdaleno at approximately 3:57 PM.     DICTATED:   06/07/2020  EDITED/ls :   06/07/2020           CT Cerebral Perfusion With & Without Contrast [[id number]] Collected:  06/07/20 1634     Updated:  06/07/20 1649    Narrative:       EXAMINATION: CT CEREBRAL PERFUSION WWO CONTRAST - 06/07/2020     INDICATION: TIA. I63.9-Cerebral infarction, unspecified.     TECHNIQUE: Cerebral perfusion analysis was performed using computed  tomography with contrast administration including postprocessing of  parametric maps with determination of cerebral blood flow, cerebral  blood volume and mean transit time.     The radiation dose reduction device was turned on for each scan per the  ALARA (As Low as Reasonably Achievable) protocol.     COMPARISON: Unenhanced head CT scan of same date. Cerebral perfusion  exam 03/11/2020     FINDINGS: Rapid analysis today shows small area of diminished cerebral  blood flow, less than 30% along the posterior margin of patient's old  right MCA territory infarct. There is slightly smaller area of T-max  greater than six seconds corresponding to the same location. No evidence  of large vessel occlusion is seen. Individual perfusion maps show a  somewhat larger area, mostly in the white matter, of increased mean  transit time and time to drain around the old infarct/encephalomalacia,  corresponding area of decreased cerebral blood flow throughout the  region, and smaller area of decreased cerebral blood volume. Overall,  these findings could reflect a new or chronic ischemia along the  posterior margin old right infarct/encephalomalacia  territory, however,  this is apparently contralateral to the patient's current symptoms. No  evidence of potential ischemia/infarct is seen elsewhere.       Impression:       Right frontotemporal encephalomalacia as a result of  patient's previous intracranial hemorrhage. Rapid analysis and perfusion  mapping suggest a focal area of ischemia along the dorsal margin of the  old area of encephalomalacia/infarct. This does not, however, apparently  correlate with patient's current right-sided symptoms. No evidence of  large vessel occlusion.     DICTATED:   06/07/2020  EDITED/ls :   06/07/2020            Results for orders placed during the hospital encounter of 03/11/20   Adult Transthoracic Echo Complete W/ Cont if Necessary Per Protocol (With Agitated Saline)    Narrative · Left ventricular systolic function is hyperdynamic (EF > 70).  · Left ventricular diastolic dysfunction (grade I a) consistent with   impaired relaxation.  · Mild aortic valve regurgitation is present.  · Mild mitral valve regurgitation is present  · Mild tricuspid valve regurgitation is present.  · Saline test results are negative.          Assessment/Plan:      1. Stroke-like symptoms  -CT head without contrast--Residual right frontotemporal encephalomalacia from  patient's previous intracranial hemorrhage. No evidence of acute  infarction or other clearly acute intracranial disease is seen.  -CT perfusion showed no evidence of LVO  -CTA head and neck  -MRI ordered  -We will hold on ordering further studies as patient received full work-up in March 2020  -We will repeat lipid panel and hemoglobin A1c in a.m. to compare studies from March  -Initiate TIA/ischemic stroke standing order set  2. Hypertension   -Allow for permissive hypertension for first 24 hours per standing order set  3. Hyperlipidemia  -Continue statin  4. History of recent stroke  -Continue Lipitor, add aspirin 325 mg for now  5.  Tobacco abuse  -Continue to educate and  encourage smoking cessation  -Nicotine patch        Madison Manzano, APRN  June 7, 2020  5:43 PM

## 2020-06-08 VITALS
HEIGHT: 63 IN | WEIGHT: 106 LBS | RESPIRATION RATE: 18 BRPM | DIASTOLIC BLOOD PRESSURE: 82 MMHG | SYSTOLIC BLOOD PRESSURE: 140 MMHG | OXYGEN SATURATION: 92 % | BODY MASS INDEX: 18.78 KG/M2 | HEART RATE: 85 BPM | TEMPERATURE: 98.7 F

## 2020-06-08 LAB
CHOLEST SERPL-MCNC: 166 MG/DL (ref 0–200)
GLUCOSE BLDC GLUCOMTR-MCNC: 110 MG/DL (ref 70–130)
HBA1C MFR BLD: 5.9 % (ref 4.8–5.6)
HDLC SERPL-MCNC: 40 MG/DL (ref 40–60)
LDLC SERPL CALC-MCNC: 81 MG/DL (ref 0–100)
LDLC/HDLC SERPL: 2.04 {RATIO}
TRIGL SERPL-MCNC: 223 MG/DL (ref 0–150)
VLDLC SERPL-MCNC: 44.6 MG/DL

## 2020-06-08 PROCEDURE — G0378 HOSPITAL OBSERVATION PER HR: HCPCS

## 2020-06-08 PROCEDURE — 92523 SPEECH SOUND LANG COMPREHEN: CPT

## 2020-06-08 PROCEDURE — 99205 OFFICE O/P NEW HI 60 MIN: CPT | Performed by: INTERNAL MEDICINE

## 2020-06-08 PROCEDURE — 83036 HEMOGLOBIN GLYCOSYLATED A1C: CPT | Performed by: NURSE PRACTITIONER

## 2020-06-08 PROCEDURE — 97165 OT EVAL LOW COMPLEX 30 MIN: CPT | Performed by: OCCUPATIONAL THERAPIST

## 2020-06-08 PROCEDURE — 97161 PT EVAL LOW COMPLEX 20 MIN: CPT

## 2020-06-08 PROCEDURE — 82962 GLUCOSE BLOOD TEST: CPT

## 2020-06-08 PROCEDURE — 80061 LIPID PANEL: CPT | Performed by: NURSE PRACTITIONER

## 2020-06-08 PROCEDURE — 99217 PR OBSERVATION CARE DISCHARGE MANAGEMENT: CPT | Performed by: INTERNAL MEDICINE

## 2020-06-08 RX ORDER — ONDANSETRON 2 MG/ML
4 INJECTION INTRAMUSCULAR; INTRAVENOUS EVERY 6 HOURS PRN
Status: DISCONTINUED | OUTPATIENT
Start: 2020-06-08 | End: 2020-06-08 | Stop reason: HOSPADM

## 2020-06-08 RX ORDER — ASPIRIN 81 MG/1
81 TABLET ORAL DAILY
Qty: 30 TABLET | Refills: 3 | Status: SHIPPED | OUTPATIENT
Start: 2020-06-08 | End: 2021-01-01 | Stop reason: SDUPTHER

## 2020-06-08 RX ADMIN — ASPIRIN 325 MG ORAL TABLET 325 MG: 325 PILL ORAL at 08:12

## 2020-06-08 RX ADMIN — METOPROLOL TARTRATE 25 MG: 25 TABLET, FILM COATED ORAL at 10:35

## 2020-06-08 RX ADMIN — BUDESONIDE 3 MG: 3 CAPSULE ORAL at 05:14

## 2020-06-08 RX ADMIN — PANTOPRAZOLE SODIUM 40 MG: 40 TABLET, DELAYED RELEASE ORAL at 08:12

## 2020-06-08 NOTE — PLAN OF CARE
Problem: Patient Care Overview  Goal: Plan of Care Review  Flowsheets (Taken 6/8/2020 1407)  Progress: improving  Plan of Care Reviewed With: patient  Outcome Summary: Pt reports being close to baseline physically.  Good strength and ambulation.  Ambulated 350' indpendently, DC skilled therapy services, pt safe to return home.

## 2020-06-08 NOTE — PLAN OF CARE
Problem: Patient Care Overview  Goal: Plan of Care Review  Outcome: Ongoing (interventions implemented as appropriate)  Flowsheets (Taken 6/8/2020 7537)  Plan of Care Reviewed With: patient; daughter  Note:   SLP evaluation completed. Will sign-off as no deficits identified with speech, language or cognition at this time. Please see note for further details and recommendations.

## 2020-06-08 NOTE — THERAPY DISCHARGE NOTE
Acute Care - Occupational Therapy Initial Eval/Discharge  [facility]     Patient Name: Maria Luz Wade  : 1945  MRN: [id number]  Today's Date: 2020               Admit Date: 2020       ICD-10-CM ICD-9-CM   1. Acute CVA (cerebrovascular accident) (CMS/HCC) I63.9 434.91   2. Impaired mobility and ADLs Z74.09 799.89     Patient Active Problem List   Diagnosis   • Dizziness   • PVC (premature ventricular contraction)   • Smoker   • Palpitations   • Dyslipidemia on statin    • Essential hypertension   • R MCA AIS s/p tPA and thrombectomy    • Tissue plasminogen activator (tPA) administered at other facility within 24 hours prior to current admission   • Intracranial bleed (CMS/[facility]); hemorrhagic conversion of an ischemic stroke   • Suspected cerebrovascular accident (CVA)   • Essential hypertension     Past Medical History:   Diagnosis Date   • History of tonsillectomy    • History of tubal ligation    • Hyperlipidemia      Past Surgical History:   Procedure Laterality Date   • CARDIOVASCULAR STRESS TEST  2017    L.Myoview- R/O Anterior Ischemia.   • CONVERTED (HISTORICAL) HOLTER  2020    AVG 77. . 6.8% PVC. one 6 beats run of V-Tach   • ECHO - CONVERTED  2017    EF 65%. Mild-Mod MR   • INTERVENTIONAL RADIOLOGY PROCEDURE Bilateral 3/11/2020    Procedure: CAROTID CEREBRAL ANGIOGRAM BILATERAL;  Surgeon: Adryan Carter MD;  Location: [facility] INVASIVE LOCATION;  Service: Interventional Radiology;  Laterality: Bilateral;   • OTHER SURGICAL HISTORY      CT Scan- 60% (L) ICA. 50% (R) ICA   • US CAROTID UNILATERAL  2017    @ [facility]. No sig stenosis          OT ASSESSMENT FLOWSHEET (last 12 hours)      Occupational Therapy Evaluation     Row Name 20 0745                   OT Evaluation Time/Intention    Subjective Information  no complaints  -AR        Document Type  evaluation;discharge treatment  -AR        Mode of Treatment  occupational therapy  -AR         Symptoms Noted During/After Treatment  other (see comments) tachycardia- nurse aware  -AR           General Information    Patient Profile Reviewed?  yes  -AR        Prior Level of Function  independent:;all household mobility;community mobility;gait;transfer;ADL's;dependent:;driving has not driven since prior CVA  -AR        Existing Precautions/Restrictions  no known precautions/restrictions  -AR           Relationship/Environment    Primary Source of Support/Comfort  child(farzad)  -AR        Lives With  child(farzad), adult  -AR        Concerns About Impact on Relationships  Pt lives with daughter  -AR           Resource/Environmental Concerns    Current Living Arrangements  home/apartment/condo  -AR        Resource/Environmental Concerns  home accessibility  -AR        Home Accessibility Concerns  bathroom not accessible  -AR           Cognitive Assessment/Interventions    Additional Documentation  Cognitive Assessment/Intervention (Group)  -AR           Cognitive Assessment/Intervention- PT/OT    Affect/Mental Status (Cognitive)  WNL  -AR        Orientation Status (Cognition)  oriented x 4  -AR        Follows Commands (Cognition)  WNL  -AR        Cognitive Function (Cognitive)  WNL  -AR           Bed Mobility Assessment/Treatment    Bed Mobility Assessment/Treatment  scooting/bridging;supine-sit;sit-supine  -AR        Scooting/Bridging [place] (Bed Mobility)  independent  -AR        Supine-Sit [place] (Bed Mobility)  conditional independence  -AR        Sit-Supine [place] (Bed Mobility)  conditional independence  -AR        Assistive Device (Bed Mobility)  head of bed elevated;bed rails  -AR           Functional Mobility    Functional Mobility- Ind. Level  independent  -AR        Functional Mobility-Distance (Feet)  15  -AR           Transfer Assessment/Treatment    Transfer Assessment/Treatment  sit-stand transfer;stand-sit transfer;toilet transfer  -AR           Sit-Stand Transfer    Sit-Stand  [place] (Transfers)  independent  -AR           Stand-Sit Transfer    Stand-Sit [place] (Transfers)  independent  -AR           Toilet Transfer    Type (Toilet Transfer)  sit-stand;stand-sit  -AR        [place] Level (Toilet Transfer)  conditional independence  -AR        Assistive Device (Toilet Transfer)  raised toilet seat  -AR           ADL Assessment/Intervention    BADL Assessment/Intervention  lower body dressing;feeding  -AR           Lower Body Dressing Assessment/Training    Lower Body Dressing [place] Level  don;socks;independent simulate  -AR        Lower Body Dressing Position  edge of bed sitting  -AR           Self-Feeding Assessment/Training    Comment (Feeding)  Pt reports no issues with self-feeding and manipulating standard utensils   -AR           General ROM    GENERAL ROM COMMENTS  WFL BUE  -AR           MMT (Manual Muscle Testing)    General MMT Comments  4+/ BUE  -AR           Motor Assessment/Interventions    Additional Documentation  Balance (Group);Balance Interventions (Group);Fine Motor Testing & Training (Group);Gross Motor Coordination (Group)  -AR           Gross Motor Coordination    Gross Motor Impairments  finger to nose intact bilat  -AR           Balance    Balance  static sitting balance;static standing balance  -AR           Static Sitting Balance    Level of [place] (Unsupported Sitting, Static Balance)  independent  -AR        Sitting Position (Unsupported Sitting, Static Balance)  sitting on edge of bed  -AR        Time Able to Maintain Position (Unsupported Sitting, Static Balance)  more than 5 minutes  -AR           Static Standing Balance    Level of [place] (Supported Standing, Static Balance)  independent  -AR        Time Able to Maintain Position (Supported Standing, Static Balance)  45 to 60 seconds  -AR           Fine Motor Testing & Training    Comment, Fine Motor Coordination  B opposition intact  -AR           Sensory  Assessment/Intervention    Sensory General Assessment  light touch sensation deficits identified  -AR        Additional Documentation  Vision Assessment/Intervention (Group)  -AR           Light Touch Sensation Assessment    Left Upper Extremity: Light Touch Sensation Assessment  intact  -AR        Right Upper Extremity: Light Touch Sensation Assessment  mild impairment, 75% or more correct responses R hand- pt reports chronic from CTS  -AR           Vision Assessment/Intervention    Visual Impairment/Limitations  WNL  -AR           Positioning and Restraints    Pre-Treatment Position  in bed  -AR        Post Treatment Position  bed  -AR        In Bed  supine;call light within reach;encouraged to call for assist;exit alarm on;with nsg  -AR           Pain Assessment    Additional Documentation  Pain Scale: Numbers Pre/Post-Treatment (Group)  -AR           Pain Scale: Numbers Pre/Post-Treatment    Pain Scale: Numbers, Pretreatment  0/10 - no pain  -AR        Pain Scale: Numbers, Post-Treatment  0/10 - no pain  -AR           Wound 06/07/20 2030 upper thoracic spine Other (comment)    Wound - Properties Group Date first assessed: 06/07/20 -SA Time first assessed: 2030 -SA Orientation: upper  - Location: thoracic spine  - Primary Wound Type: Other  -SA Additional Comments: mole  -SA       Plan of Care Review    Plan of Care Reviewed With  patient  -AR           Clinical Impression (OT)    Therapy Frequency (OT Eval)  evaluation only  -AR        Anticipated Discharge Disposition (OT)  home with assist  -AR           Vital Signs    Pre Systolic BP Rehab  124  -AR        Pre Treatment Diastolic BP  72  -AR        Post Systolic BP Rehab  122  -AR        Post Treatment Diastolic BP  97  -AR        Pretreatment Heart Rate (beats/min)  120  -AR        Intratreatment Heart Rate (beats/min)  138  -AR        Posttreatment Heart Rate (beats/min)  125  -AR        Pre SpO2 (%)  92  -AR        O2 Delivery Pre Treatment  room air   -AR        Pre Patient Position  Supine  -AR        Intra Patient Position  Sitting  -AR        Post Patient Position  Supine  -AR           OT Goals    Transfer Goal Selection (OT)  --  -AR           Discharge Summary (Occupational Therapy)    Additional Documentation  Discharge Summary, OT Eval (Group)  -AR           Discharge Summary, OT Eval    Reason for Discharge (OT Discharge Summary)  no further needs identified  -AR           Living Environment    Home Accessibility  tub/shower is not walk in pt has shower seat if needed  -AR          User Key  (r) = Recorded By, (t) = Taken By, (c) = Cosigned By    Initials Name Effective Dates    Rachele Barton, OT 06/22/15 -     Heidi Cisse, YUKI 08/14/19 -           Occupational Therapy Education                 Title: PT OT SLP Therapies (In Progress)     Topic: Occupational Therapy (In Progress)     Point: ADL training (Done)     Description:   Instruct learner(s) on proper safety adaptation and remediation techniques during self care or transfers.   Instruct in proper use of assistive devices.              Learning Progress Summary           Patient JORDON Rush, VU by AR at 6/8/2020 0765                   Point: Home exercise program (Not Started)     Description:   Instruct learner(s) on appropriate technique for monitoring, assisting and/or progressing therapeutic exercises/activities.              Learner Progress:   Not documented in this visit.          Point: Precautions (Not Started)     Description:   Instruct learner(s) on prescribed precautions during self-care and functional transfers.              Learner Progress:   Not documented in this visit.          Point: Body mechanics (Not Started)     Description:   Instruct learner(s) on proper positioning and spine alignment during self-care, functional mobility activities and/or exercises.              Learner Progress:   Not documented in this visit.                      User Key     Initials  Effective Dates Name Provider Type Discipline    AR 06/22/15 -  Rachele Garcia, OT Occupational Therapist OT                OT Recommendation and Plan  Outcome Summary/Treatment Plan (OT)  Anticipated Discharge Disposition (OT): home with assist  Reason for Discharge (OT Discharge Summary): no further needs identified  Therapy Frequency (OT Eval): evaluation only  Plan of Care Review  Plan of Care Reviewed With: patient  Plan of Care Reviewed With: patient  Outcome Summary: Pt alert, Ox4 and reports feeing at baseline. Pt independent with ADL task and mobility. No issues with BUE ROM, strength, coordination, balance or vision. She reports mild numbness R hand, states this is chronic from CTS. OT to [place] skilled services d/t pt has no skilled needs.     Rehab Goal Summary     Row Name 06/08/20 0745             Occupational Therapy Goals    Transfer Goal Selection (OT)  --  -AR        User Key  (r) = Recorded By, (t) = Taken By, (c) = Cosigned By    Initials Name Provider Type Discipline    Rachele Barton, OT Occupational Therapist OT          Outcome Measures     Row Name 06/08/20 0745             How much help from another is currently needed...    Putting on and taking off regular lower body clothing?  4  -AR      Bathing (including washing, rinsing, and drying)  4  -AR      Toileting (which includes using toilet bed pan or urinal)  4  -AR      Putting on and taking off regular upper body clothing  4  -AR      Taking care of personal grooming (such as brushing teeth)  4  -AR      Eating meals  4  -AR      AM-PAC 6 Clicks Score (OT)  24  -AR         Modified [place] Scale    Modified [place] Scale  0 - No Symptoms at all.  -AR         Functional Assessment    Outcome Measure Options  AM-PAC 6 Clicks Daily Activity (OT);Modified [place]  -AR        User Key  (r) = Recorded By, (t) = Taken By, (c) = Cosigned By    Initials Name Provider Type    Rachele Barton, OT Occupational Therapist          Time  Calculation:   Time Calculation- OT     Row Name 06/08/20 0745             Time Calculation- OT    OT Start Time  0745  -AR      OT Received On  06/08/20  -AR      OT Goal Re-Cert Due Date  06/08/20  -AR        User Key  (r) = Recorded By, (t) = Taken By, (c) = Cosigned By    Initials Name Provider Type    Rachele Barton OT Occupational Therapist        Therapy Suggested Charges     Code   Minutes Charges    None           Therapy Charges for Today     Code Description Service Date Service Provider Modifiers Qty    [id number]  OT EVAL LOW COMPLEXITY 4 6/8/2020 Rachele Garcia OT GO 1               OT Discharge Summary  Anticipated Discharge Disposition (OT): home with assist  Reason for Discharge: At baseline function  Discharge Destination: Home with assist    Rachele Garcia OT  6/8/2020
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
## 2020-06-08 NOTE — THERAPY DISCHARGE NOTE
Acute Care - Speech Language Pathology Initial Eval/Discharge  [facility]   Cognitive-Communication Evaluation       Patient Name: Maria Luz Wade  : 1945  MRN: [id number]  Today's Date: 2020               Admit Date: 2020     Visit Dx:    ICD-10-CM ICD-9-CM   1. Acute CVA (cerebrovascular accident) (CMS/HCC) I63.9 434.91   2. Impaired mobility and ADLs Z74.09 799.89     Patient Active Problem List   Diagnosis   • Dizziness   • PVC (premature ventricular contraction)   • Smoker   • Palpitations   • Dyslipidemia on statin    • Essential hypertension   • R MCA AIS s/p tPA and thrombectomy    • Tissue plasminogen activator (tPA) administered at other facility within 24 hours prior to current admission   • Intracranial bleed (CMS/[facility]); hemorrhagic conversion of an ischemic stroke   • Suspected cerebrovascular accident (CVA)   • Essential hypertension     Past Medical History:   Diagnosis Date   • History of tonsillectomy    • History of tubal ligation    • Hyperlipidemia      Past Surgical History:   Procedure Laterality Date   • CARDIOVASCULAR STRESS TEST  2017    L.Myoview- R/O Anterior Ischemia.   • CONVERTED (HISTORICAL) HOLTER  2020    AVG 77. . 6.8% PVC. one 6 beats run of V-Tach   • ECHO - CONVERTED  2017    EF 65%. Mild-Mod MR   • INTERVENTIONAL RADIOLOGY PROCEDURE Bilateral 3/11/2020    Procedure: CAROTID CEREBRAL ANGIOGRAM BILATERAL;  Surgeon: Adryan Carter MD;  Location: [facility] INVASIVE LOCATION;  Service: Interventional Radiology;  Laterality: Bilateral;   • OTHER SURGICAL HISTORY      CT Scan- 60% (L) ICA. 50% (R) ICA   • US CAROTID UNILATERAL  2017    @ [facility]. No sig stenosis          SLP EVALUATION (last 72 hours)      SLP SLC Evaluation     Row Name 20 1150                   Communication Assessment/Intervention    Document Type  discharge evaluation/summary  -        Subjective Information  no complaints  -CH        Patient  Observations  alert;cooperative;agree to therapy  -        Patient/Family Observations  Daughter present  -        Patient Effort  excellent  -           General Information    Patient Profile Reviewed  yes  -        Pertinent History Of Current Problem  74 y.o. 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 HX prior CVA 3/20. SLC evaluation completed per stroke protocol.   -        Precautions/Limitations, Vision  WFL;for purposes of eval  -CH        Precautions/Limitations, Hearing  WFL;for purposes of eval  -CH        Prior Level of Function-Communication  [facility]  -        Plans/Goals Discussed with  patient and family;agreed upon  -        Barriers to Rehab  none identified  -        Patient's Goals for Discharge  return to home  -           Pain Assessment    Additional Documentation  Pain Scale: FACES Pre/Post-Treatment (Group)  -           Pain Scale: Numbers Pre/Post-Treatment    Pain Scale: Numbers, Pretreatment  0/10 - no pain  -        Pain Scale: Numbers, Post-Treatment  0/10 - no pain  -           Pain Scale: FACES Pre/Post-Treatment    Pain: FACES Scale, Pretreatment  0-->no hurt  -        Pain: FACES Scale, Post-Treatment  0-->no hurt  -           Comprehension Assessment/Intervention    Comprehension Assessment/Intervention  Auditory Comprehension;Reading Comprehension  -           Auditory Comprehension Assessment/Intervention    Auditory Comprehension (Communication)  WNL  -        Able to Identify Objects/Pictures (Communication)  body part;familiar objects;WNL  -        Answers Questions (Communication)  yes/no;wh questions;personal;simple;concrete;mulit-unit;complex;abstract;L  -        Able to Follow Commands (Communication)  3-step;multi-step;L  -        Narrative Discourse  conversational level;L  -           Reading Comprehension  Assessment/Intervention    Reading Comprehension (Communication)  WNL  -        Functional Reading Tasks  WNL  -           Expression Assessment/Intervention    Expression Assessment/Intervention  verbal expression  -CH           Verbal Expression Assessment/Intervention    Verbal Expression  WNL  -        Automatic Speech (Communication)  response to greeting;counting 1-20;WNL  -        Repetition  sentences;WNL  -        Phrase Completion  unpredictable;WNL  -        Responsive Naming  complex;WNL  -        Confrontational Naming  low frequency;WNL  -        Spontaneous/Functional Words  WNL  -        Sentence Formulation  simple;complex;WNL  -        Conversational Discourse/Fluency  WN  -           Oral Motor Structure and Function    Oral Motor Structure and Function  WNL  -           Oral Musculature and Cranial Nerve Assessment    Oral Motor General Assessment  WFL  -           Motor Speech Assessment/Intervention    Motor Speech Function  WNL  -           Cursory Voice Assessment/Intervention    Quality and Resonance (Voice)  WNL  -           Cognitive Assessment Intervention- SLP    Cognitive Function (Cognition)  [facility]  -        Orientation Status (Cognition)  awareness of basic personal information;person;place;time;situation;WNL  -        Memory (Cognitive)  simple;mod-complex;functional;immediate;short-term;long-term;delayed;related;WNL  -        Attention (Cognitive)  selective;sustained;alternating;divided;attention to detail;[facility]  -        Thought Organization (Cognitive)  concrete divergent;abstract divergent;concrete convergent;abstract convergent;drawing conclusions;verbal sequencing;[facility]  -        Reasoning (Cognitive)  simple;mod-complex;complex;deductive;analogies;mental flexibility;WNL  -        Problem Solving (Cognitive)  simple;multifactorial;temporal;WNL  -        Functional Math (Cognitive)  simple;word problems;[facility]  -        Executive Function  (Cognition)  realistic goal setting;deficit awareness;judgement;initiation;time management;planning;self-monitoring/correction;WNL  -        Pragmatics (Communication)  WNL  -           SLP Clinical Impressions    SLP Diagnosis  No deficits identified with speech, language or cognition at this time  -[facility] Criteria for Skilled Therapy Interventions Met  no problems identified which require skilled intervention  -        Functional Impact  no impact on function  -           Recommendations    Therapy Frequency (SLP [facility])  evaluation only  -        Anticipated Dischage Disposition (SLP)  home  -        Demonstrates Need for Referral to Another Service  gastroenterology;other (see comments) reported full feeling after swallowing & poor intake.  -           SLP Discharge Summary    Discharge Destination  unknown  -        Discharge Diagnostic Statement  No deficits identified with speech, language or cognition at this time  -        Reason for Discharge  all goals and outcomes met, no further needs identified  -          User Key  (r) = Recorded By, (t) = Taken By, (c) = Cosigned By    Initials Name Effective Dates    Shanel Sue MS CCC-SLP 02/14/19 -            EDUCATION  The patient has been educated in the following areas:   Cognitive Impairment Communication Impairment.      SLP Recommendation and Plan  SLP Diagnosis: No deficits identified with speech, language or cognition at this time     [facility] Criteria for Skilled Therapy Interventions Met: no problems identified which require skilled intervention  Anticipated Dischage Disposition (SLP): home                         Time Calculation:   Time Calculation- SLP     Row Name 06/08/20 1500             Time Calculation- [facility]    SLP Start Time  1150  -      SLP Received On  06/08/20  -        User Key  (r) = Recorded By, (t) = Taken By, (c) = Cosigned By    Initials Name Provider Type    Shanel Sue MS CCC-SLP Speech and  Language Pathologist          Therapy Charges for Today     Code Description Service Date Service Provider Modifiers Qty    [id number]  ST EVAL SPEECH AND PROD W LANG  4 6/8/2020 Shanel Castanon, MS CCC-SLP GN 1                   SLP Discharge Summary  Anticipated Dischage Disposition (SLP): home  Reason for Discharge: all goals and outcomes met, no further needs identified  Discharge Destination: unknown    Shanel Castanon MS CCC-SLP  6/8/2020

## 2020-06-08 NOTE — THERAPY EVALUATION
Patient Name: Maria Luz Wade  : 1945    MRN: [id number]                              Today's Date: 2020       Admit Date: 2020    Visit Dx:     ICD-10-CM ICD-9-CM   1. Acute CVA (cerebrovascular accident) (CMS/Formerly [facility]) I63.9 434.91   2. Impaired mobility and ADLs Z74.09 799.89     Patient Active Problem List   Diagnosis   • Dizziness   • PVC (premature ventricular contraction)   • Smoker   • Palpitations   • Dyslipidemia on statin    • Essential hypertension   • R MCA AIS s/p tPA and thrombectomy    • Tissue plasminogen activator (tPA) administered at other facility within 24 hours prior to current admission   • Intracranial bleed (CMS/Formerly [facility]); hemorrhagic conversion of an ischemic stroke   • Suspected cerebrovascular accident (CVA)   • Essential hypertension     Past Medical History:   Diagnosis Date   • History of tonsillectomy    • History of tubal ligation    • Hyperlipidemia      Past Surgical History:   Procedure Laterality Date   • CARDIOVASCULAR STRESS TEST  2017    L.Myoview- R/O Anterior Ischemia.   • CONVERTED (HISTORICAL) HOLTER  2020    AVG 77. . 6.8% PVC. one 6 beats run of V-Tach   • ECHO - CONVERTED  2017    EF 65%. Mild-Mod MR   • INTERVENTIONAL RADIOLOGY PROCEDURE Bilateral 3/11/2020    Procedure: CAROTID CEREBRAL ANGIOGRAM BILATERAL;  Surgeon: Adryan Carter MD;  Location: [facility] INVASIVE LOCATION;  Service: Interventional Radiology;  Laterality: Bilateral;   • OTHER SURGICAL HISTORY      CT Scan- 60% (L) ICA. 50% (R) ICA   • US CAROTID UNILATERAL  2017    @ [facility]. No sig stenosis     General Information     Row Name 20 1405          PT Evaluation Time/Intention    Document Type  evaluation;discharge treatment  -KG     Mode of Treatment  physical therapy  -KG     Row Name 20 1404          General Information    Patient Profile Reviewed?  yes  -KG     Prior Level of Function  independent:;all household  mobility;ADL's;community mobility  -KG     Existing Precautions/Restrictions  no known precautions/restrictions  -KG     Row Name 06/08/20 1405          Relationship/Environment    Lives With  child(farzad), adult  -KG     Row Name 06/08/20 1405          Resource/Environmental Concerns    Current Living Arrangements  home/apartment/condo  -KG     Row Name 06/08/20 1405          Home Main Entrance    Number of Stairs, Main Entrance  none  -KG     Row Name 06/08/20 1405          Cognitive Assessment/Intervention- PT/OT    Orientation Status (Cognition)  oriented x 4  -KG       User Key  (r) = Recorded By, (t) = Taken By, (c) = Cosigned By    Initials Name Provider Type    KG Nadine Bonner Physical Therapist        Mobility     Row Name 06/08/20 1406          Bed Mobility Assessment/Treatment    Bed Mobility Assessment/Treatment  scooting/bridging;supine-sit;sit-supine  -KG     Scooting/Bridging [place] (Bed Mobility)  independent  -KG     Supine-Sit [place] (Bed Mobility)  conditional independence  -KG     Sit-Supine [place] (Bed Mobility)  conditional independence  -KG     Assistive Device (Bed Mobility)  head of bed elevated;bed rails  -KG     Row Name 06/08/20 1406          Sit-Stand Transfer    Sit-Stand [place] (Transfers)  independent  -KG     Row Name 06/08/20 1406          Gait/Stairs Assessment/Training    Gait/Stairs Assessment/Training  gait/ambulation independence  -KG     Comment (Gait/Stairs)  Ambulated 350 ft with independence  -KG       User Key  (r) = Recorded By, (t) = Taken By, (c) = Cosigned By    Initials Name Provider Type    VIRY Nadine Bonner Physical Therapist        Obj/Interventions     Row Name 06/08/20 1407          Static Sitting Balance    Level of [place] (Unsupported Sitting, Static Balance)  independent  -KG     Sitting Position (Unsupported Sitting, Static Balance)  sitting on edge of bed  -KG     Time Able to Maintain Position (Unsupported Sitting, Static  Balance)  more than 5 minutes  -KG     Row Name 06/08/20 1407          Static Standing Balance    Level of [place] (Supported Standing, Static Balance)  independent  -KG       User Key  (r) = Recorded By, (t) = Taken By, (c) = Cosigned By    Initials Name Provider Type    Nadine Nino Physical Therapist        Goals/Plan    No documentation.       Clinical Impression     Row Name 06/08/20 1407          Pain Scale: Numbers Pre/Post-Treatment    Pain Scale: Numbers, Pretreatment  0/10 - no pain  -KG     Pain Scale: Numbers, Post-Treatment  0/10 - no pain  -KG     Row Name 06/08/20 1407          Plan of Care Review    Plan of Care Reviewed With  patient  -KG     Progress  improving  -KG     Outcome Summary  Pt reports being close to baseline physically.  Good strength and ambulation.  Ambulated 350' indpendently, DC skilled therapy services.    -KG     Row Name 06/08/20 1407          Physical Therapy Clinical Impression    Criteria for Skilled Interventions Met (PT Clinical Impression)  no  -KG     Row Name 06/08/20 1407          Vital Signs    Pre Systolic BP Rehab  137  -KG     Pre Treatment Diastolic BP  86  -KG     Post Systolic BP Rehab  140  -KG     Post Treatment Diastolic BP  82  -KG     Row Name 06/08/20 1407          Positioning and Restraints    Pre-Treatment Position  in bed  -KG     Post Treatment Position  bed  -KG     In Bed  notified nsg;call light within reach;encouraged to call for assist;exit alarm on;with family/caregiver  -KG       User Key  (r) = Recorded By, (t) = Taken By, (c) = Cosigned By    Initials Name Provider Type    Nadine Nino Physical Therapist        Outcome Measures     Row Name 06/08/20 1410          How much help from another person do you currently need...    Turning from your back to your side while in flat bed without using bedrails?  4  -KG     Moving from lying on back to sitting on the side of a flat bed without bedrails?  4  -KG     Moving to and from a  bed to a chair (including a wheelchair)?  4  -KG     Standing up from a chair using your arms (e.g., wheelchair, bedside chair)?  4  -KG     Climbing 3-5 steps with a railing?  3  -KG     To walk in hospital room?  4  -KG     AM-PAC 6 Clicks Score (PT)  23  -KG     Row Name 06/08/20 1410          Modified [place] Scale    Modified [place] Scale  0 - No Symptoms at all.  -KG     Row Name 06/08/20 1410          Functional Assessment    Outcome Measure Options  AM-PAC 6 Clicks Basic Mobility (PT)  -KG       User Key  (r) = Recorded By, (t) = Taken By, (c) = Cosigned By    Initials Name Provider Type    VIRY Nadine Bonner Physical Therapist        Physical Therapy Education                 Title: PT OT SLP Therapies (In Progress)     Topic: Physical Therapy (Done)     Point: Mobility training (Done)     Description:   Instruct learner(s) on safety and technique for assisting patient out of bed, chair or wheelchair.  Instruct in the proper use of assistive devices, such as walker, crutches, cane or brace.              Patient Friendly Description:   It's important to get you on your feet again, but we need to do so in a way that is safe for you. Falling has serious consequences, and your personal safety is the most important thing of all.        When it's time to get out of bed, one of us or a family member will sit next to you on the bed to give you support.     If your doctor or nurse tells you to use a walker, crutches, a cane, or a brace, be sure you use it every time you get out of bed, even if you think you don't need it.    Learning Progress Summary           Patient Acceptance, E,TB, VU by KG at 6/8/2020 1411                   Point: Home exercise program (Done)     Description:   Instruct learner(s) on appropriate technique for monitoring, assisting and/or progressing patient with therapeutic exercises and activities.              Learning Progress Summary           Patient Acceptance, E,TB, VU by KG at 6/8/2020  1411                   Point: Body mechanics (Done)     Description:   Instruct learner(s) on proper positioning and spine alignment for patient and/or caregiver during mobility tasks and/or exercises.              Learning Progress Summary           Patient Acceptance, E,TB, VU by KG at 6/8/2020 1411                   Point: Precautions (Done)     Description:   Instruct learner(s) on prescribed precautions during mobility and gait tasks              Learning Progress Summary           Patient Acceptance, E,TB, VU by KG at 6/8/2020 1411                               User Key     Initials Effective Dates Name Provider Type [facility] 08/28/19 -  Nadine Bonner Physical Therapist PT              PT Recommendation and Plan     Plan of Care Reviewed With: patient  Progress: improving  Outcome Summary: Pt reports being close to baseline physically.  Good strength and ambulation.  Ambulated 350' indpendently, DC skilled therapy services.       Time Calculation:   PT Charges     Row Name 06/08/20 1412             Time Calculation    Start Time  1325  -KG      PT Received On  06/08/20  -KG        User Key  (r) = Recorded By, (t) = Taken By, (c) = Cosigned By    Initials Name Provider Type    Nadine Nino Physical Therapist        Therapy Charges for Today     Code Description Service Date Service Provider Modifiers Qty    22465921754 HC PT EVAL LOW COMPLEXITY 4 6/8/2020 Nadine Bonner GP 1          PT G-Codes  Outcome Measure Options: AM-PAC 6 Clicks Basic Mobility (PT)  AM-PAC 6 Clicks Score (PT): 23  AM-PAC 6 Clicks Score (OT): 24  Modified [place] Scale: 0 - No Symptoms at all.    Nadine Bonner  6/8/2020

## 2020-06-08 NOTE — PROGRESS NOTES
Discharge Planning Assessment  [facility]     Patient Name: Maria Luz Wade  MRN: [id number]  Today's Date: 6/8/2020    Admit Date: 6/7/2020    Discharge Needs Assessment     Row Name 06/08/20 1247       Living Environment    Lives With  child(farzad), adult    Name(s) of Who Lives With Patient  Daughter: Janell    Current Living Arrangements  home/apartment/condo    Family Caregiver if Needed  child(farzad), adult    Quality of Family Relationships  helpful;involved;supportive    Able to Return to Prior Arrangements  yes       Resource/Environmental Concerns    Resource/Environmental Concerns  none    Transportation Concerns  car, none       Transition Planning    Patient/Family Anticipates Transition to  home with family    Patient/Family Anticipated Services at Transition  none    Transportation Anticipated  family or friend will provide       Discharge Needs Assessment    Readmission Within the Last 30 Days  no previous admission in last 30 days    Concerns to be Addressed  no discharge needs identified    Equipment Currently Used at Home  none    Equipment Needed After Discharge  none        Discharge Plan     Row Name 06/08/20 9582       Plan    Plan  Home with family assistance    Plan Comments  Met with Ms. Wade and her daughter, Sameera, at the bedside for discharge planning.  Ms. Wade lives in a basement apartment of her daughter's home in [place].  She states that prior to admission, she ambulated independent and was independent with her ADL's.  She does have a history of stroke last March and was in [facility]PR at [facility].  Ms. Wade states that she feels she is back to baselined.  OT have evaluted the patient and she is independent with her ADL's and with ambulation.  DC plan is to return home with her daughter's assistance as needed. No needs identified.  Sameera to transport her Mother home when discharged.    CM will continue to follow.    Final Discharge Disposition Code  01 - home  or self-care        Destination      Coordination has not been started for this encounter.      Durable Medical Equipment      Coordination has not been started for this encounter.      Dialysis/Infusion      Coordination has not been started for this encounter.      Home Medical Care      Coordination has not been started for this encounter.      Therapy      Coordination has not been started for this encounter.      Community Resources      Coordination has not been started for this encounter.          Demographic Summary     Row Name 06/08/20 1246       General Information    Admission Type  observation    Arrived From  home    Reason for Consult  discharge planning    General Information Comments  PCP:  Sameera Davenport       Contact Information    Permission Granted to Share Info With      Contact Information Comments  Daughter:  Sameera Davenport,  [phone]        Functional Status     Row Name 06/08/20 1247       Functional Status    Usual Activity Tolerance  good    Current Activity Tolerance  good See OT notes       Functional Status, IADL    Medications  independent    Meal Preparation  independent    Housekeeping  independent    Laundry  independent    Shopping  independent       Mental Status    General Appearance WDL  WDL        Psychosocial    No documentation.       Abuse/Neglect    No documentation.       Legal     Row Name 06/08/20 2807       Financial/Legal    Finance Comments  Ms. Wade has prescription drug coverage with Med D plan.  Verified insurance with patient.        Substance Abuse    No documentation.       Patient Forms    No documentation.           Erika Oneal, RN
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
## 2020-06-08 NOTE — PLAN OF CARE
Problem: Patient Care Overview  Goal: Plan of Care Review  Flowsheets  Taken 6/8/2020 0771  Plan of Care Reviewed With: patient  Taken 6/8/2020 0800  Outcome Summary: Pt alert, Ox4 and reports feeing at baseline. Pt independent with ADL task and mobility. No issues with BUE ROM, strength, coordination, balance or vision. She reports mild numbness R hand, states this is chronic from CTS. OT to DC skilled services d/t pt has no skilled needs.

## 2020-06-08 NOTE — PLAN OF CARE
Problem: Patient Care Overview  Goal: Plan of Care Review  Flowsheets (Taken 6/8/2020 0330)  Progress: improving  Plan of Care Reviewed With: patient  Outcome Summary: pt recieved from ED, VSS, RA, NIH - 1, voiding adequately, changes positions independently, Tele - NSR with PVC's, education stroke booklet completed, SCUDS on, will continue to monitor.

## 2020-06-15 ENCOUNTER — OFFICE VISIT (OUTPATIENT)
Dept: NEUROSURGERY | Facility: CLINIC | Age: 75
End: 2020-06-15

## 2020-06-15 DIAGNOSIS — I62.9 INTRACRANIAL BLEED (HCC): ICD-10-CM

## 2020-06-15 DIAGNOSIS — I63.411 CEREBROVASCULAR ACCIDENT (CVA) DUE TO EMBOLISM OF RIGHT MIDDLE CEREBRAL ARTERY (HCC): Primary | ICD-10-CM

## 2020-06-15 PROCEDURE — 99442 PR PHYS/QHP TELEPHONE EVALUATION 11-20 MIN: CPT | Performed by: PHYSICIAN ASSISTANT

## 2020-06-15 NOTE — PROGRESS NOTES
Subjective     Chief Complaint: Follow-up thrombectomy    Patient ID: Maria Luz Wade is a 74 y.o. female is here today for follow-up.    History of Present Illness    This is a 74-year-old woman who presented to [facility] with symptoms of a right hemispheric stroke syndrome.  She received TPA at OSH and was transferred to [facility] for higher level of care.  On arrival, CT perfusion demonstrated a large amount of salvageable ischemic penumbra within the right superior MCA division territory.  Patient was taken for emergent mechanical thrombectomy of right MCA thrombus on 3/11/2020.  Initially, the patient was doing well following the procedure, however she became more lethargic the following day and CT head without showed hemorrhagic conversion of her stroke.  The patient was discharged to [facility] rehab on 3/16/2020, at that time she had improved but continued to have some mild left lower extremity weakness, facial droop and dysarthria, NIH was 4 at discharge.    Presently, the patient is 3 months status post IV TPA and right MCA mechanical thrombectomy with subsequent hemorrhagic conversion of her stroke.  She was recently mated to the hospital on 6/7/2020 after she had an episode of a heavy feeling in her right arm and numbness and tingling in her legs below her knees.  A repeat MRI was performed while she was hospitalized and demonstrated stable appearance of her hemorrhage.  Patient symptoms resolved during hospitalization and she was discharged on 6/8/2020.    She reports she is doing very well and she is very pleased with her outcome.  She continues to perform all ADLs independently.  She continues to work with PT/OT/speech.  She has remained compliant with her Eliquis.  She was started on aspirin during recent hospitalization.  She denies any numbness, weakness, speech difficulties, facial droop, visual changes, or other signs or symptoms of stroke.    The following portions of the  patient's history were reviewed and updated as appropriate: allergies, current medications, past family history, past medical history, past social history, past surgical history and problem list.    Family history:   Family History   Problem Relation Age of Onset   • Hyperlipidemia Mother    • Hypertension Mother    • Stroke Mother    • Heart attack Father    • Aneurysm Father    • Alzheimer's disease Maternal Aunt    • Stroke Maternal Grandmother    • Stroke Maternal Grandfather        Social history:   Social History     Socioeconomic History   • Marital status:      Spouse name: Not on file   • Number of children: Not on file   • Years of education: Not on file   • Highest education level: Not on file   Tobacco Use   • Smoking status: Current Every Day Smoker     Packs/day: 1.00     Years: 60.00     Pack years: 60.00   • Smokeless tobacco: Never Used   • Tobacco comment: patient counseled on effects of tobacco use on health   Substance and Sexual Activity   • Alcohol use: No   • Drug use: No       Review of Systems    Objective   There were no vitals taken for this visit.  There is no height or weight on file to calculate BMI.  Patient's There is no height or weight on file to calculate BMI. BMI is within normal parameters. No follow-up required..      Physical Exam      Assessment/Plan     This is a 74-year-old woman who is 3 months status post IV TPA and right MCA mechanical thrombectomy with subsequent hemorrhagic conversion of stroke.  Patient has had an excellent recovery.  She is performing all ADLs independently without assistance.  She lives in the basement apartment of her sons home.  She is very pleased with her outcome and continues to work with PT/OT.  She has remained compliant with aspirin, Eliquis (patient has A. fib) and Lipitor.  She follows with her PCP and cardiologist locally.    I discussed signs and symptoms of stroke with the patient the need to call 911 if she thinks she has having  a stroke.  Return to ED criteria reviewed with patient.  In regards to her right temporal hemorrhage, repeat MRI performed on 6/7/2020 was reviewed with Dr. Carter and demonstrates improved appearance as compared to MRI performed on 4/20/2020.  She will follow-up in routine tele-visit in 3 months to check on her progress.    Modified Esthela score: 0    Maria Luz Wade  reports that she has been smoking. She has a 60.00 pack-year smoking history. She has never used smokeless tobacco.. I have educated her on the risk of diseases from using tobacco products such as cancer, heart diease and ICAD.     I advised her to quit and she is not willing to quit.    I spent 5 minutes counseling the patient.     You have chosen to receive care through a telephone visit. Do you consent to use a telephone visit for your medical care today? Yes  I spent approximately 15 minutes on the phone with the patient.         Diagnoses and all orders for this visit:    Cerebrovascular accident (CVA) due to embolism of right middle cerebral artery (CMS/HCC)    Intracranial bleed (CMS/HCC); hemorrhagic conversion of an ischemic stroke        Return in about 3 months (around 9/15/2020), or if symptoms worsen or fail to improve.             Cat Dunbar PA-C

## 2020-06-16 ENCOUNTER — CALL CENTER PROGRAMS (OUTPATIENT)
Dept: CALL CENTER | Facility: HOSPITAL | Age: 75
End: 2020-06-16

## 2020-06-16 NOTE — OUTREACH NOTE
Stroke [place]      Responses   Facility patient discharged from?  [facility]   Attempt successful  Yes   Person spoke with today (if not patient) and relationship  Patient [Office follow up visit 6/15]   Patient location 30 days post discharge if known  Home   Was the patient readmitted within 30 days of discharge?  No   Could you live alone without any help from another person?  Yes   Can you do everything that you were doing right before your stroke even if slower and not as much?  Yes   Are you completely back to the way you were right before your stroke?  Yes   Can you walk from one room to another without help from another person?  Yes   Can the patient sit up in bed without any help?  Yes   Call Center [place] Score  0   [place] score call completed  Yes   Comments  Per office follow up visit on 6/15/20  mRS=0          Santa Rogers RN

## 2020-06-30 ENCOUNTER — OFFICE VISIT (OUTPATIENT)
Dept: CARDIOLOGY | Facility: CLINIC | Age: 75
End: 2020-06-30

## 2020-06-30 VITALS
HEART RATE: 76 BPM | DIASTOLIC BLOOD PRESSURE: 62 MMHG | WEIGHT: 111 LBS | SYSTOLIC BLOOD PRESSURE: 104 MMHG | BODY MASS INDEX: 19.67 KG/M2 | HEIGHT: 63 IN | TEMPERATURE: 98.1 F

## 2020-06-30 DIAGNOSIS — I62.9 INTRACRANIAL BLEED (HCC): ICD-10-CM

## 2020-06-30 DIAGNOSIS — F17.200 SMOKER: ICD-10-CM

## 2020-06-30 DIAGNOSIS — R00.2 PALPITATIONS: ICD-10-CM

## 2020-06-30 DIAGNOSIS — E78.00 HYPERCHOLESTEREMIA: ICD-10-CM

## 2020-06-30 DIAGNOSIS — I63.40 CEREBROVASCULAR ACCIDENT (CVA) DUE TO EMBOLISM OF CEREBRAL ARTERY (HCC): Primary | ICD-10-CM

## 2020-06-30 DIAGNOSIS — I48.0 PAF (PAROXYSMAL ATRIAL FIBRILLATION) (HCC): ICD-10-CM

## 2020-06-30 DIAGNOSIS — I10 ESSENTIAL HYPERTENSION: ICD-10-CM

## 2020-06-30 PROCEDURE — 99213 OFFICE O/P EST LOW 20 MIN: CPT | Performed by: INTERNAL MEDICINE

## 2020-06-30 RX ORDER — CHOLESTYRAMINE 4 G/9G
1 POWDER, FOR SUSPENSION ORAL 2 TIMES DAILY PRN
COMMUNITY
End: 2020-11-18

## 2020-06-30 RX ORDER — LISINOPRIL 10 MG/1
10 TABLET ORAL DAILY
COMMUNITY
End: 2020-11-18 | Stop reason: ALTCHOICE

## 2020-06-30 RX ORDER — VARENICLINE TARTRATE 0.5 MG/1
0.5 TABLET, FILM COATED ORAL 2 TIMES DAILY
Qty: 60 TABLET | Refills: 4 | Status: SHIPPED | OUTPATIENT
Start: 2020-06-30 | End: 2020-11-18

## 2020-06-30 RX ORDER — ONDANSETRON 4 MG/1
4 TABLET, FILM COATED ORAL EVERY 8 HOURS PRN
COMMUNITY

## 2020-06-30 NOTE — PROGRESS NOTES
Chief Complaint   Patient presents with   • Follow-up     for cardiac management   • Stroke     first stroke March 11, had another stroke 6/7/20, Sameera took her to [facility], records in chart.    • Med Refill     reports she does not need any refills.    • EGD     seeing  Dr Barbour, planning EGD       CARDIAC COMPLAINTS  fatigue and Cardiac Follow up        Subjective   Maria Luz Wade is a 74 y.o. female came in today with her daughter for the hospital follow-up.  She was usually seen at our office for palpitation and dizziness and work-up at that time shows normal LV function and few short runs of V. tach and stress test showed possible anterior wall ischemia.  She finally agreed to undergo cardiac catheterization and was planned to have it done in March.  On the day when was supposed to do the cardiac catheterization she developed acute stroke.  She was sent to [facility] where they found a thrombus in the right MCA.  She did undergo thrombectomy with good results.  Unfortunately since she also got the PA prior to that she did develop a hemorrhagic stroke which resolved.  She was treated at rehab and has done fairly well.  She came today stating that she had 1 more episode with similar problem but in the other side.  She did undergo work-up and did not find any new stroke.  Aspirin was added at this time.  She came today with no new symptoms no chest pain no shortness of breath or palpitation is better.  The diagnosis of possible PAF was made and she is being treated with Eliquis.  She unfortunately still continues to smoke.              Cardiac History  Past Surgical History:   Procedure Laterality Date   • CARDIOVASCULAR STRESS TEST  11/30/2017    L.Myoview- R/O Anterior Ischemia.   • CONVERTED (HISTORICAL) HOLTER  02/12/2020    AVG 77. . 6.8% PVC. one 6 beats run of V-Tach   • ECHO - CONVERTED  11/30/2017    EF 65%. Mild-Mod MR   • ECHO - CONVERTED  03/12/2020    @ [facility]. EF 70%. Mild MR &  AI. Negative shunt   • INTERVENTIONAL RADIOLOGY PROCEDURE Bilateral 3/11/2020    CAROTID CEREBRAL ANGIOGRAM- Thromboectomy of RMCA Thrombus   • OTHER SURGICAL HISTORY  2015    CT Scan- 60% (L) ICA. 50% (R) ICA   • US CAROTID UNILATERAL  11/16/2017    @ [facility]. No sig stenosis   • US CAROTID UNILATERAL  03/11/2020    @ [facility]- No sig lesion       Current Outpatient Medications   Medication Sig Dispense Refill   • acetaminophen (TYLENOL) 325 MG tablet Take 2 tablets by mouth Every 4 (Four) Hours As Needed for Mild Pain .     • albuterol sulfate  (90 Base) MCG/ACT inhaler Inhale 2 puffs Every 4 (Four) Hours As Needed for Wheezing.     • amLODIPine (NORVASC) 5 MG tablet Take 5 mg by mouth 2 (Two) Times a Day.     • apixaban (ELIQUIS) 5 MG tablet tablet Take 5 mg by mouth 2 (Two) Times a Day.     • aspirin 81 MG EC tablet Take 1 tablet by mouth Daily. 30 tablet 3   • atorvastatin (LIPITOR) 80 MG tablet Take 1 tablet by mouth Every Night.     • Budesonide (ENTOCORT EC) 3 MG 24 hr capsule Take 3 mg by mouth Every Morning.     • busPIRone (BUSPAR) 10 MG tablet Take 10 mg by mouth 3 (Three) Times a Day As Needed.     • Cholecalciferol (VITAMIN D) 125 MCG (5000 UT) capsule capsule Take 5,000 Units by mouth Daily.     • cholestyramine (QUESTRAN) 4 g packet Take 1 packet by mouth 2 (Two) Times a Day As Needed.     • DULoxetine (CYMBALTA) 60 MG capsule Take 60 mg by mouth 2 (Two) Times a Day.     • glucosamine-chondroitin 500-400 MG capsule capsule Take  by mouth 2 (Two) Times a Day With Meals.     • lisinopril (PRINIVIL,ZESTRIL) 10 MG tablet Take 10 mg by mouth Daily.     • loratadine (CLARITIN) 10 MG tablet Take 10 mg by mouth Daily.     • metoprolol tartrate (LOPRESSOR) 25 MG tablet Take 1 tablet by mouth Every 12 (Twelve) Hours.     • nystatin (MYCOSTATIN) 404562 UNIT/ML suspension Swish and swallow 500,000 Units 4 (Four) Times a Day.     • ondansetron (ZOFRAN) 4 MG tablet Take 4 mg by mouth Every 8 (Eight) Hours As Needed  for Nausea or Vomiting.     • pantoprazole (PROTONIX) 40 MG EC tablet Take 40 mg by mouth Daily.     • sertraline (ZOLOFT) 25 MG tablet Take 25 mg by mouth Daily.     • vitamin B-12 (CYANOCOBALAMIN) 1000 MCG tablet Take 2,000 mcg by mouth Daily.     • nicotine (NICODERM CQ) 21 MG/24HR patch Place 1 patch on the skin as directed by provider Daily.     • varenicline (CHANTIX) 0.5 MG tablet Take 1 tablet by mouth 2 (Two) Times a Day. 60 tablet 4     No current facility-administered medications for this visit.        Allergies  :  Patient has no known allergies.       Past Medical History:   Diagnosis Date   • History of tonsillectomy 1951   • History of tubal ligation    • Hyperlipidemia        Social History     Socioeconomic History   • Marital status:      Spouse name: Not on file   • Number of children: Not on file   • Years of education: Not on file   • Highest education level: Not on file   Tobacco Use   • Smoking status: Current Every Day Smoker     Packs/day: 1.00     Years: 60.00     Pack years: 60.00   • Smokeless tobacco: Never Used   • Tobacco comment: patient counseled on effects of tobacco use on health   Substance and Sexual Activity   • Alcohol use: No   • Drug use: No       Family History   Problem Relation Age of Onset   • Hyperlipidemia Mother    • Hypertension Mother    • Stroke Mother    • Heart attack Father    • Aneurysm Father    • Alzheimer's disease Maternal Aunt    • Stroke Maternal Grandmother    • Stroke Maternal Grandfather        Review of Systems   Constitution: Positive for malaise/fatigue. Negative for decreased appetite.   HENT: Negative for congestion and sore throat.    Eyes: Negative for blurred vision.   Cardiovascular: Negative for chest pain and palpitations.   Respiratory: Negative for shortness of breath and snoring.    Endocrine: Negative for cold intolerance and heat intolerance.   Hematologic/Lymphatic: Negative for adenopathy. Does not bruise/bleed easily.   Skin:  "Negative for itching, nail changes and skin cancer.   Musculoskeletal: Negative for arthritis and myalgias.   Gastrointestinal: Negative for abdominal pain, dysphagia and heartburn.   Genitourinary: Negative for bladder incontinence and frequency.   Neurological: Negative for dizziness, light-headedness, seizures and vertigo.   Psychiatric/Behavioral: Negative for altered mental status.   Allergic/Immunologic: Negative for environmental allergies and hives.       Diabetes- No  Thyroid- normal    Objective     /62   Pulse 76   Temp 98.1 °F (36.7 °C)   Ht 160 cm (63\")   Wt 50.3 kg (111 lb)   BMI 19.66 kg/m²     Physical Exam   Constitutional: She is oriented to person, place, and time. She appears well-developed and well-nourished.   HENT:   Head: Normocephalic.   Nose: Nose normal.   Eyes: Pupils are equal, round, and reactive to light. EOM are normal.   Neck: Normal range of motion.   Cardiovascular: Normal rate, regular rhythm, S1 normal and S2 normal.   Murmur heard.  Pulmonary/Chest: Effort normal and breath sounds normal.   Abdominal: Soft. Bowel sounds are normal.   Musculoskeletal: Normal range of motion. She exhibits no edema.   Neurological: She is alert and oriented to person, place, and time.   Skin: Skin is warm and dry.   Psychiatric: She has a normal mood and affect.     Procedures            Assessment/Plan   Patient's Body mass index is 19.66 kg/m². BMI is within normal parameters. No follow-up required..     Maria Luz was seen today for follow-up, stroke, med refill and egd.    Diagnoses and all orders for this visit:    Cerebrovascular accident (CVA) due to embolism of cerebral artery (CMS/HCC)    Essential hypertension    Smoker  -     varenicline (CHANTIX) 0.5 MG tablet; Take 1 tablet by mouth 2 (Two) Times a Day.    Palpitations    Intracranial bleed (CMS/HCC); hemorrhagic conversion of an ischemic stroke    PAF (paroxysmal atrial fibrillation) (CMS/HCC)       At baseline her heart rate " and blood pressure appears stable.  Her rhythm seems to be normal.  Her clinical examination reveals a BMI of 20.  Her cardiovascular examination is unremarkable.  She at this time has no residual defect.    Regarding the CVA, most likely it is from the PAF.  At this time continue the Eliquis along with the aspirin.  I do not plan to do any AILYN at this time since it does not make any difference whether we find a thrombus or not in the LA appendage.    Regarding the hypertension, it seems to be controlled with the amlodipine along with ACE inhibitor's and low-dose of beta-blockers.    Regarding her hypercholesterolemia, she is on Lipitor.  That needs to be rechecked again along with the LFT.    Regarding the smoking, I talked to her again about the increased risk.  She is now willing to try Chantix.  She apparently tried nicotine patches as well as the Nicotrol inhalers and unable to tolerate it.  Prescription for Chantix was given.    Since she is not having any symptoms at this time we will continue conservative management.  If she develops either palpitation or dizziness then will plan to do the elective cardiac catheterization.    I will see her back in 6 months or sooner if needed.                  Electronically signed by Tonya Lee MD June 30, 2020 17:33

## 2020-07-01 ENCOUNTER — PRIOR AUTHORIZATION (OUTPATIENT)
Dept: CARDIOLOGY | Facility: CLINIC | Age: 75
End: 2020-07-01

## 2020-07-20 ENCOUNTER — TELEPHONE (OUTPATIENT)
Dept: CARDIOLOGY | Facility: CLINIC | Age: 75
End: 2020-07-20

## 2020-07-20 NOTE — TELEPHONE ENCOUNTER
Received fax from Dr. Barbour for cardiac clearance for patient to have an EGD. Patient is on Eliquis and they are requesting to hold. According to our records, I do not see where patient has had any stenting. Patient has a history of CVA.     Fax [phone]

## 2020-09-09 ENCOUNTER — TELEPHONE (OUTPATIENT)
Dept: NEUROSURGERY | Facility: CLINIC | Age: 75
End: 2020-09-09

## 2020-09-09 NOTE — TELEPHONE ENCOUNTER
Pt would like to know if she can change her upcoming appt on 9/14/20 to a televisit. She is coming in for a 3 mth follow up for CVA and intracranial bleed.  Last visit was televist on 6/15/20   Please advise!    Pt contact# [phone]  Best time to call: anytime

## 2020-09-14 ENCOUNTER — OFFICE VISIT (OUTPATIENT)
Dept: NEUROSURGERY | Facility: CLINIC | Age: 75
End: 2020-09-14

## 2020-09-14 DIAGNOSIS — I63.411 CEREBROVASCULAR ACCIDENT (CVA) DUE TO EMBOLISM OF RIGHT MIDDLE CEREBRAL ARTERY (HCC): Primary | ICD-10-CM

## 2020-09-14 PROCEDURE — 99213 OFFICE O/P EST LOW 20 MIN: CPT | Performed by: PHYSICIAN ASSISTANT

## 2020-09-14 NOTE — PROGRESS NOTES
Subjective     Chief Complaint: Follow-up MCA stroke    Patient ID: Maria Luz Wade is a 74 y.o. female is here today for follow-up.    History of Present Illness    This is a 74-year-old woman who received IV TPA and underwent emergent mechanical thrombectomy of right MCA thrombus on 3/11/2020.  Postoperatively, patient had a hemorrhagic conversion of her stroke.  She reports today in routine follow-up, 6 months out from CVA.  She reports she is doing excellent.  She denies any weakness, numbness, headache, visual changes, speech difficulties, facial droop, or other signs or symptoms of stroke.  She lives independently in an apartment in the basement of her daughter's home.  She performs all ADLs independently.  She continues on Eliquis for A. fib and is followed by cardiology in [place].    The following portions of the patient's history were reviewed and updated as appropriate: allergies, current medications, past family history, past medical history, past social history, past surgical history and problem list.    Family history:   Family History   Problem Relation Age of Onset   • Hyperlipidemia Mother    • Hypertension Mother    • Stroke Mother    • Heart attack Father    • Aneurysm Father    • Alzheimer's disease Maternal Aunt    • Stroke Maternal Grandmother    • Stroke Maternal Grandfather        Social history:   Social History     Socioeconomic History   • Marital status:      Spouse name: Not on file   • Number of children: Not on file   • Years of education: Not on file   • Highest education level: Not on file   Tobacco Use   • Smoking status: Current Every Day Smoker     Packs/day: 1.00     Years: 60.00     Pack years: 60.00   • Smokeless tobacco: Never Used   • Tobacco comment: patient counseled on effects of tobacco use on health   Substance and Sexual Activity   • Alcohol use: No   • Drug use: No       Review of Systems   Constitutional: Negative for activity change, appetite change, chills,  diaphoresis, fatigue, fever and unexpected weight change.   Respiratory: Negative for shortness of breath.    Cardiovascular: Negative for chest pain.   Gastrointestinal: Positive for nausea.   Genitourinary: Negative for difficulty urinating.   Musculoskeletal: Negative for gait problem.   Neurological: Negative for dizziness, tremors, seizures, syncope, facial asymmetry, speech difficulty, weakness, light-headedness, numbness and headaches.   Hematological: Negative for adenopathy. Does not bruise/bleed easily.   All other systems reviewed and are negative.      Objective   There were no vitals taken for this visit.  There is no height or weight on file to calculate BMI.  Patient's There is no height or weight on file to calculate BMI. BMI is above normal parameters. Recommendations include: exercise counseling and nutrition counseling.      Physical Exam      Assessment/Plan     Ms. Wade is seen today in follow-up 6 months status post IV TPA and right MCA mechanical thrombectomy with subsequent hemorrhagic conversion of the stroke.  Patient has had an excellent recovery and is doing very well.  She performs all ADLs independently.  She is very pleased with her postoperative outcome.  She has cut back on smoking and is currently taking Chantix, smoking half pack per day.  We discussed the importance of continued tobacco cessation and she voiced understanding.  I reviewed the signs and symptoms of stroke at length with the patient and instructed her to call 911 if she thinks she is having a stroke.  We will follow with her on an as-needed basis moving forward.  She will continue to follow with her cardiologist for her A. Fib.    mRS: 0    You have chosen to receive care through a telephone visit. Do you consent to use a telephone visit for your medical care today? Yes  I spent approximately 15 minutes on the phone with the patient.      Diagnoses and all orders for this visit:    Cerebrovascular accident (CVA)  due to embolism of right middle cerebral artery (CMS/HCC)        Return if symptoms worsen or fail to improve.             Cat Dunbar PA-C

## 2020-11-16 ENCOUNTER — TELEPHONE (OUTPATIENT)
Dept: CARDIOLOGY | Facility: CLINIC | Age: 75
End: 2020-11-16

## 2020-11-16 NOTE — TELEPHONE ENCOUNTER
Pt's daughter called, pt has been having increased palpitations, increased heart rate 126-150. BP low at 70's/50's with some dizziness noted. Appointment scheduled.

## 2020-11-18 ENCOUNTER — OFFICE VISIT (OUTPATIENT)
Dept: CARDIOLOGY | Facility: CLINIC | Age: 75
End: 2020-11-18

## 2020-11-18 VITALS
TEMPERATURE: 97.6 F | BODY MASS INDEX: 18.07 KG/M2 | DIASTOLIC BLOOD PRESSURE: 74 MMHG | WEIGHT: 102 LBS | HEART RATE: 77 BPM | SYSTOLIC BLOOD PRESSURE: 118 MMHG | HEIGHT: 63 IN

## 2020-11-18 DIAGNOSIS — I48.0 PAF (PAROXYSMAL ATRIAL FIBRILLATION) (HCC): Primary | ICD-10-CM

## 2020-11-18 DIAGNOSIS — E78.00 HYPERCHOLESTEREMIA: ICD-10-CM

## 2020-11-18 DIAGNOSIS — I95.89 HYPOTENSION, CHRONIC: ICD-10-CM

## 2020-11-18 DIAGNOSIS — I62.9 INTRACRANIAL BLEED (HCC): ICD-10-CM

## 2020-11-18 DIAGNOSIS — R42 DIZZINESS: ICD-10-CM

## 2020-11-18 PROCEDURE — 99213 OFFICE O/P EST LOW 20 MIN: CPT | Performed by: INTERNAL MEDICINE

## 2020-11-18 PROCEDURE — 93000 ELECTROCARDIOGRAM COMPLETE: CPT | Performed by: INTERNAL MEDICINE

## 2020-11-18 RX ORDER — IMIPRAMINE HCL 25 MG
25 TABLET ORAL NIGHTLY
COMMUNITY

## 2020-11-18 RX ORDER — BUSPIRONE HYDROCHLORIDE 5 MG/1
5 TABLET ORAL 3 TIMES DAILY
COMMUNITY

## 2020-11-18 RX ORDER — FLUDROCORTISONE ACETATE 0.1 MG/1
0.1 TABLET ORAL DAILY
Qty: 30 TABLET | Refills: 6 | Status: SHIPPED | OUTPATIENT
Start: 2020-11-18 | End: 2020-12-03 | Stop reason: SINTOL

## 2020-11-18 RX ORDER — DICYCLOMINE HYDROCHLORIDE 10 MG/1
10 CAPSULE ORAL
COMMUNITY
End: 2021-01-01 | Stop reason: ALTCHOICE

## 2020-11-18 NOTE — PROGRESS NOTES
Chief Complaint   Patient presents with   • Follow-up     for cardiac management   • Dizziness     having dizziness after standing up and walking. BP has been dropping, heart rate increasing. Describes as a wave of dizziness the extreme weakness all over, maybe a couple episodes of syncope   • GI problems     having a lot of nausea, vomiting, diarrhea. Has had numerous tests.    • Medication changes     Sameera had decreased then stopped the amlodipine and Lisinopril to try to improve the dizziness/ hypotension.    • Hypotension     BP dropping as low as 70/50 prior to stopping the BP meds.  BP did improve but continued to drop with position changes.    • Therapy     currently getting therapy to rule out vertigo        CARDIAC COMPLAINTS  Dizziness and fatigue        Subjective   Maria Luz Wade is a 75 y.o. female came in today for her regular follow-up visit.  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  She did develop hemorrhagic stroke which improved.  She came today for the follow-up visit stating that she has been having a lot of dizziness especially when she gets up and walk.  Her blood pressure has been dropping so most of her blood pressure medication has been stopped other than the Lopressor.  Her heart rate apparently goes up when she does any kind of exertion.  She continues to have a lot of nausea vomiting and diarrhea.  She has undergone EGD and colonoscopy and biopsy unable to find a cause.  Her last set of labs done 2 months ago showed her electrolytes are normal her albumin was low at 3.3 her blood count was slightly low.  She has been having some episodes of vertigo and getting some treatment for that.              Cardiac History  Past Surgical History:   Procedure Laterality Date   • CARDIOVASCULAR STRESS TEST  11/30/2017    L.Myoview- R/O Anterior Ischemia.   • CONVERTED (HISTORICAL) HOLTER  02/12/2020    AVG 77. . 6.8% PVC. one 6 beats run of  V-Tach   • ECHO - CONVERTED  11/30/2017    EF 65%. Mild-Mod MR   • ECHO - CONVERTED  03/12/2020    @ [facility]. EF 70%. Mild MR & AI. Negative shunt   • INTERVENTIONAL RADIOLOGY PROCEDURE Bilateral 3/11/2020    CAROTID CEREBRAL ANGIOGRAM- Thromboectomy of RMCA Thrombus   • OTHER SURGICAL HISTORY  2015    CT Scan- 60% (L) ICA. 50% (R) ICA   • US CAROTID UNILATERAL  11/16/2017    @ [facility]. No sig stenosis   • US CAROTID UNILATERAL  03/11/2020    @ [facility]- No sig lesion       Current Outpatient Medications   Medication Sig Dispense Refill   • acetaminophen (TYLENOL) 325 MG tablet Take 2 tablets by mouth Every 4 (Four) Hours As Needed for Mild Pain .     • albuterol sulfate  (90 Base) MCG/ACT inhaler Inhale 2 puffs Every 4 (Four) Hours As Needed for Wheezing.     • apixaban (ELIQUIS) 5 MG tablet tablet Take 5 mg by mouth 2 (Two) Times a Day.     • aspirin 81 MG EC tablet Take 1 tablet by mouth Daily. 30 tablet 3   • atorvastatin (LIPITOR) 80 MG tablet Take 1 tablet by mouth Every Night.     • Budesonide (ENTOCORT EC) 3 MG 24 hr capsule Take 3 mg by mouth Every Morning.     • busPIRone (BUSPAR) 5 MG tablet Take 5 mg by mouth 3 (Three) Times a Day.     • Cholecalciferol (VITAMIN D) 125 MCG (5000 UT) capsule capsule Take 5,000 Units by mouth Daily.     • dicyclomine (BENTYL) 10 MG capsule Take 10 mg by mouth 3 (Three) Times a Day Before Meals.     • DULoxetine (CYMBALTA) 60 MG capsule Take 60 mg by mouth Daily.     • glucosamine-chondroitin 500-400 MG capsule capsule Take  by mouth 2 (Two) Times a Day With Meals.     • imipramine (TOFRANIL) 25 MG tablet Take 25 mg by mouth Every Night.     • loratadine (CLARITIN) 10 MG tablet Take 10 mg by mouth Daily.     • metoprolol tartrate (LOPRESSOR) 25 MG tablet Take 1 tablet by mouth Every 12 (Twelve) Hours.     • ondansetron (ZOFRAN) 4 MG tablet Take 4 mg by mouth Every 8 (Eight) Hours As Needed for Nausea or Vomiting.     • pantoprazole (PROTONIX) 40 MG EC tablet Take 40 mg by mouth  Daily.     • Polyethylene Glycol 3350 (MIRALAX PO) Take  by mouth Daily As Needed.     • vitamin B-12 (CYANOCOBALAMIN) 1000 MCG tablet Take 2,000 mcg by mouth Daily.     • fludrocortisone 0.1 MG tablet Take 1 tablet by mouth Daily. 30 tablet 6     No current facility-administered medications for this visit.        Allergies  :  Patient has no known allergies.       Past Medical History:   Diagnosis Date   • History of tonsillectomy 1951   • History of tubal ligation    • Hyperlipidemia        Social History     Socioeconomic History   • Marital status:      Spouse name: Not on file   • Number of children: Not on file   • Years of education: Not on file   • Highest education level: Not on file   Tobacco Use   • Smoking status: Current Every Day Smoker     Packs/day: 1.00     Years: 60.00     Pack years: 60.00   • Smokeless tobacco: Never Used   • Tobacco comment: patient counseled on effects of tobacco use on health   Substance and Sexual Activity   • Alcohol use: No   • Drug use: No       Family History   Problem Relation Age of Onset   • Hyperlipidemia Mother    • Hypertension Mother    • Stroke Mother    • Heart attack Father    • Aneurysm Father    • Alzheimer's disease Maternal Aunt    • Stroke Maternal Grandmother    • Stroke Maternal Grandfather        Review of Systems   Constitution: Positive for malaise/fatigue and weight loss. Negative for decreased appetite.   HENT: Negative for congestion and sore throat.    Eyes: Negative for blurred vision.   Cardiovascular: Positive for dyspnea on exertion and near-syncope. Negative for chest pain.   Respiratory: Negative for shortness of breath and snoring.    Endocrine: Negative for cold intolerance and heat intolerance.   Hematologic/Lymphatic: Negative for adenopathy. Does not bruise/bleed easily.   Skin: Negative for itching, nail changes and skin cancer.   Musculoskeletal: Negative for arthritis and myalgias.   Gastrointestinal: Positive for diarrhea.  "Negative for abdominal pain, dysphagia and heartburn.   Genitourinary: Negative for bladder incontinence and frequency.   Neurological: Positive for dizziness and light-headedness. Negative for seizures and vertigo.   Psychiatric/Behavioral: Negative for altered mental status.   Allergic/Immunologic: Negative for environmental allergies and hives.       Diabetes- No  Thyroid- normal    Objective     /74   Pulse 77   Temp 97.6 °F (36.4 °C)   Ht 160 cm (63\")   Wt 46.3 kg (102 lb)   BMI 18.07 kg/m²     Vitals signs and nursing note reviewed.   Constitutional:       Appearance: Healthy appearance. Not in distress.   Eyes:      Conjunctiva/sclera: Conjunctivae normal.      Pupils: Pupils are equal, round, and reactive to light.   HENT:      Head: Normocephalic.   Neck:      Musculoskeletal: Normal range of motion.      Thyroid: Thyroid normal.   Pulmonary:      Effort: Pulmonary effort is normal.      Breath sounds: Normal breath sounds.   Cardiovascular:      PMI at left midclavicular line. Normal rate. Regular rhythm.      Murmurs: There is a grade 3/6 mid frequency midsystolic murmur at the apex.   Abdominal:      General: Bowel sounds are normal.      Palpations: Abdomen is soft.   Musculoskeletal: Normal range of motion.         General: Tenderness present.   Skin:     General: Skin is warm and dry.   Neurological:      Mental Status: Alert, oriented to person, place, and time and oriented to person, place and time.         ECG 12 Lead    Date/Time: 11/18/2020 4:48 PM  Performed by: Tonya Lee MD  Authorized by: Tonya Lee MD   Comparison: compared with previous ECG from 6/7/2020  Similar to previous ECG  Rhythm: sinus rhythm  Rate: normal  Conduction: non-specific intraventricular conduction delay  QRS axis: normal    Clinical impression: non-specific ECG                    Assessment/Plan   Patient's Body mass index is 18.07 kg/m². BMI is within normal parameters. No follow-up " required..     Diagnoses and all orders for this visit:    1. PAF (paroxysmal atrial fibrillation) (CMS/HCC) (Primary)    2. Hypercholesteremia    3. Intracranial bleed (CMS/HCC); hemorrhagic conversion of an ischemic stroke    4. Dizziness    5. Hypotension, chronic  -     fludrocortisone 0.1 MG tablet; Take 1 tablet by mouth Daily.  Dispense: 30 tablet; Refill: 6       At baseline her heart rate is stable.  Her blood pressure is lower limit of normal and standing up it dropped to less than 100.  Her BMI is 18.  She has lost about 5 more pounds in the last few months.  Her clinical examination is unremarkable.  Her EKG which was done today showed that she is still maintaining sinus rhythm with intraventricular conduction delay and a Q-wave in lead V1 and V2.    Regarding the PAF, she is still maintaining sinus with Lopressor.  At this time continue the same and continue the Eliquis.    Regarding her hypercholesterolemia, she is on high-dose of Lipitor.  Lipid panel need to be done and if it is very well controlled we can start cutting down on the dose    Regarding her history of bleed she is doing much better so we will continue to monitor    Regarding the dizziness it is secondary to the hypotension.  At this time I started her on Florinef.  If her blood pressure does not get controlled, she may need to be on Northera.    I did talk to her and her son-in-law about checking the blood pressure periodically and let us know about the efficacy of Florinef.    She is due to have some labs done at your office in the next few weeks.  I will really appreciate, if you can send me copy of the labs.                  Electronically signed by Tonya Lee MD November 18, 2020 16:37 EST

## 2020-12-03 ENCOUNTER — TELEPHONE (OUTPATIENT)
Dept: CARDIOLOGY | Facility: CLINIC | Age: 75
End: 2020-12-03

## 2020-12-03 NOTE — TELEPHONE ENCOUNTER
See CXR result note for further communications.  Florinef stopped and Lasix 20 mg daily x 3 days advised.

## 2020-12-03 NOTE — TELEPHONE ENCOUNTER
Patient's daughter, Sameera, called.  She held the florinef as of last night.  Patient has had significant increase in SOA with minimal exertion, swelling in face, legs.  Her weight today was 106 lbs, BP and HR stable.  This has all came on in the last 3-4 days.  Her O2 dropping to around 89% with minimal exertion. (putting coat on)    She seen PATRICK Fong this morning. [facility]  Sameera is not sure what labs were done.  She thinks CMP, CBC, Lipids.  I asked if a BNP was done, but she was not sure.   I will log on [facility] and see if CXR has been read yet.

## 2021-01-01 ENCOUNTER — APPOINTMENT (OUTPATIENT)
Dept: VACCINE CLINIC | Facility: HOSPITAL | Age: 76
End: 2021-01-01

## 2021-01-01 ENCOUNTER — OUTSIDE FACILITY SERVICE (OUTPATIENT)
Dept: CARDIOLOGY | Facility: CLINIC | Age: 76
End: 2021-01-01

## 2021-01-01 ENCOUNTER — OFFICE VISIT (OUTPATIENT)
Dept: CARDIOLOGY | Facility: CLINIC | Age: 76
End: 2021-01-01

## 2021-01-01 ENCOUNTER — TELEPHONE (OUTPATIENT)
Dept: CARDIOLOGY | Facility: CLINIC | Age: 76
End: 2021-01-01

## 2021-01-01 ENCOUNTER — IMMUNIZATION (OUTPATIENT)
Dept: VACCINE CLINIC | Facility: HOSPITAL | Age: 76
End: 2021-01-01

## 2021-01-01 ENCOUNTER — CLINICAL SUPPORT (OUTPATIENT)
Dept: CARDIOLOGY | Facility: CLINIC | Age: 76
End: 2021-01-01

## 2021-01-01 VITALS
BODY MASS INDEX: 19.31 KG/M2 | TEMPERATURE: 97.5 F | WEIGHT: 109 LBS | SYSTOLIC BLOOD PRESSURE: 144 MMHG | HEIGHT: 63 IN | DIASTOLIC BLOOD PRESSURE: 84 MMHG | HEART RATE: 75 BPM

## 2021-01-01 VITALS
DIASTOLIC BLOOD PRESSURE: 80 MMHG | HEART RATE: 100 BPM | TEMPERATURE: 98.4 F | HEIGHT: 63 IN | BODY MASS INDEX: 19.49 KG/M2 | SYSTOLIC BLOOD PRESSURE: 128 MMHG | WEIGHT: 110 LBS

## 2021-01-01 DIAGNOSIS — R00.2 PALPITATIONS: Primary | ICD-10-CM

## 2021-01-01 DIAGNOSIS — D50.9 IRON DEFICIENCY ANEMIA, UNSPECIFIED IRON DEFICIENCY ANEMIA TYPE: ICD-10-CM

## 2021-01-01 DIAGNOSIS — I10 ESSENTIAL HYPERTENSION: Primary | ICD-10-CM

## 2021-01-01 DIAGNOSIS — Z86.73 OLD CEREBROVASCULAR ACCIDENT (CVA) WITHOUT LATE EFFECT: ICD-10-CM

## 2021-01-01 DIAGNOSIS — E03.9 HYPOTHYROIDISM, UNSPECIFIED TYPE: ICD-10-CM

## 2021-01-01 DIAGNOSIS — I48.0 PAF (PAROXYSMAL ATRIAL FIBRILLATION) (HCC): ICD-10-CM

## 2021-01-01 DIAGNOSIS — I47.1 PSVT (PAROXYSMAL SUPRAVENTRICULAR TACHYCARDIA) (HCC): ICD-10-CM

## 2021-01-01 DIAGNOSIS — I95.1 POSTURAL HYPOTENSION: ICD-10-CM

## 2021-01-01 DIAGNOSIS — R06.02 SHORTNESS OF BREATH: ICD-10-CM

## 2021-01-01 DIAGNOSIS — J43.9 PULMONARY EMPHYSEMA, UNSPECIFIED EMPHYSEMA TYPE (HCC): ICD-10-CM

## 2021-01-01 DIAGNOSIS — I48.0 PAF (PAROXYSMAL ATRIAL FIBRILLATION) (HCC): Primary | ICD-10-CM

## 2021-01-01 DIAGNOSIS — E78.00 HYPERCHOLESTEREMIA: ICD-10-CM

## 2021-01-01 DIAGNOSIS — R53.82 CHRONIC FATIGUE: ICD-10-CM

## 2021-01-01 DIAGNOSIS — R53.82 CHRONIC FATIGUE: Primary | ICD-10-CM

## 2021-01-01 DIAGNOSIS — F17.200 SMOKING: ICD-10-CM

## 2021-01-01 PROCEDURE — 93000 ELECTROCARDIOGRAM COMPLETE: CPT | Performed by: INTERNAL MEDICINE

## 2021-01-01 PROCEDURE — 99214 OFFICE O/P EST MOD 30 MIN: CPT | Performed by: INTERNAL MEDICINE

## 2021-01-01 PROCEDURE — 93018 CV STRESS TEST I&R ONLY: CPT | Performed by: INTERNAL MEDICINE

## 2021-01-01 PROCEDURE — 0001A: CPT | Performed by: FAMILY MEDICINE

## 2021-01-01 PROCEDURE — 91300 HC SARSCOV02 VAC 30MCG/0.3ML IM: CPT | Performed by: FAMILY MEDICINE

## 2021-01-01 PROCEDURE — 0002A: CPT | Performed by: INTERNAL MEDICINE

## 2021-01-01 PROCEDURE — 91300 HC SARSCOV02 VAC 30MCG/0.3ML IM: CPT | Performed by: INTERNAL MEDICINE

## 2021-01-01 PROCEDURE — 78452 HT MUSCLE IMAGE SPECT MULT: CPT | Performed by: INTERNAL MEDICINE

## 2021-01-01 PROCEDURE — 93321 DOPPLER ECHO F-UP/LMTD STD: CPT | Performed by: INTERNAL MEDICINE

## 2021-01-01 PROCEDURE — 93308 TTE F-UP OR LMTD: CPT | Performed by: INTERNAL MEDICINE

## 2021-01-01 PROCEDURE — 93325 DOPPLER ECHO COLOR FLOW MAPG: CPT | Performed by: INTERNAL MEDICINE

## 2021-01-01 PROCEDURE — 99215 OFFICE O/P EST HI 40 MIN: CPT | Performed by: INTERNAL MEDICINE

## 2021-01-01 RX ORDER — SOTALOL HYDROCHLORIDE 80 MG/1
TABLET ORAL
COMMUNITY
End: 2021-01-01

## 2021-01-01 RX ORDER — ATORVASTATIN CALCIUM 40 MG/1
40 TABLET, FILM COATED ORAL DAILY
Qty: 90 TABLET | Refills: 3 | Status: SHIPPED | OUTPATIENT
Start: 2021-01-01

## 2021-01-01 RX ORDER — PANTOPRAZOLE SODIUM 20 MG/1
20 TABLET, DELAYED RELEASE ORAL DAILY
COMMUNITY

## 2021-01-01 RX ORDER — LISINOPRIL 5 MG/1
5 TABLET ORAL DAILY
Qty: 90 TABLET | Refills: 3 | Status: SHIPPED | OUTPATIENT
Start: 2021-01-01 | End: 2021-01-01

## 2021-01-01 RX ORDER — ASPIRIN 81 MG/1
81 TABLET ORAL DAILY
Qty: 90 TABLET | Refills: 3 | Status: SHIPPED | OUTPATIENT
Start: 2021-01-01

## 2021-01-01 RX ORDER — SERTRALINE HYDROCHLORIDE 25 MG/1
25 TABLET, FILM COATED ORAL DAILY
COMMUNITY

## 2021-01-01 RX ORDER — MEGESTROL ACETATE 40 MG/1
TABLET ORAL
COMMUNITY

## 2021-01-01 RX ORDER — ATORVASTATIN CALCIUM 40 MG/1
40 TABLET, FILM COATED ORAL DAILY
Qty: 90 TABLET | Refills: 3 | Status: SHIPPED | OUTPATIENT
Start: 2021-01-01 | End: 2021-01-01 | Stop reason: SDUPTHER

## 2021-01-01 RX ORDER — LISINOPRIL 10 MG/1
10 TABLET ORAL DAILY
COMMUNITY
End: 2021-01-01

## 2021-01-01 RX ORDER — SOTALOL HYDROCHLORIDE 120 MG/1
120 TABLET ORAL 2 TIMES DAILY
Qty: 180 EACH | Refills: 3 | Status: SHIPPED | OUTPATIENT
Start: 2021-01-01 | End: 2021-01-01 | Stop reason: DRUGHIGH

## 2021-01-01 RX ORDER — LISINOPRIL 2.5 MG/1
2.5 TABLET ORAL DAILY
Qty: 90 TABLET | Refills: 3 | Status: SHIPPED | OUTPATIENT
Start: 2021-01-01

## 2021-01-01 RX ORDER — LEVOTHYROXINE SODIUM 0.03 MG/1
25 TABLET ORAL DAILY
COMMUNITY

## 2021-02-26 PROBLEM — I47.1 PSVT (PAROXYSMAL SUPRAVENTRICULAR TACHYCARDIA) (HCC): Status: ACTIVE | Noted: 2021-01-01

## 2021-02-26 PROBLEM — I47.10 PSVT (PAROXYSMAL SUPRAVENTRICULAR TACHYCARDIA): Status: ACTIVE | Noted: 2021-01-01

## 2021-02-26 NOTE — PROGRESS NOTES
Procedure     ECG 12 Lead    Date/Time: 2/26/2021 12:08 PM  Performed by: Tonya Lee MD  Authorized by: Tonya Lee MD   Comparison: compared with previous ECG from 11/18/2020  Comparison to previous ECG: Rate has gone up  Rhythm: sinus tachycardia  Rate: tachycardic  Q waves: V1 and V2    QRS axis: normal    Clinical impression: abnormal EKG

## 2021-03-02 NOTE — PROGRESS NOTES
Procedure     ECG 12 Lead    Date/Time: 3/2/2021 5:25 PM  Performed by: Tonya Lee MD  Authorized by: Tonay Lee MD   Comparison: compared with previous ECG from 2/26/2021  Comparison to previous ECG: Rate is better  Rhythm: sinus rhythm  Rate: normal  Conduction: non-specific intraventricular conduction delay  Q waves: V1 and V2    QRS axis: normal    Clinical impression: abnormal EKG

## 2021-03-18 PROBLEM — Z86.73 OLD CEREBROVASCULAR ACCIDENT (CVA) WITHOUT LATE EFFECT: Status: ACTIVE | Noted: 2021-01-01

## 2021-03-18 PROBLEM — J43.9 PULMONARY EMPHYSEMA (HCC): Status: ACTIVE | Noted: 2021-01-01

## 2021-03-18 PROBLEM — E03.9 HYPOTHYROIDISM: Status: ACTIVE | Noted: 2021-01-01

## 2021-03-18 PROBLEM — R53.82 CHRONIC FATIGUE: Status: ACTIVE | Noted: 2021-01-01

## 2021-03-18 PROBLEM — I95.1 POSTURAL HYPOTENSION: Status: ACTIVE | Noted: 2021-01-01

## 2021-03-18 NOTE — PROGRESS NOTES
Maria Luz Wade  reports that she has been smoking. She has a 60.00 pack-year smoking history. She has never used smokeless tobacco.. I have educated her on the risk of diseases from using tobacco products such as cancer, COPD and heart disease.     I advised her to quit and she is not willing to quit.    I spent 3  minutes counseling the patient.

## 2021-03-18 NOTE — PROGRESS NOTES
Chief Complaint   Patient presents with   • Hospital Follow Up Visit     doing well since discharge   • Med Refill     refills needed on cardiac meds. 90 days to [facility]   • Fatigue     increased fatigue, wanting to sleep all the time, recent labs in chart, TSH slightly elevated, Levothyroxine 25 mcg started.    • Hypotension     Sameera had checked BP's sitting and standing, notable drop. Will do orthostatic's today.    • Labs     most recent in chart       CARDIAC COMPLAINTS  Dizziness and fatigue        Subjective   Maria Luz Wade is a 75 y.o. female came in today for her hospital follow-up visit.  She has history of CVA secondary thrombus for which she has undergone thrombectomy.  She was in the hospital in February with sudden onset of SVT for which she was put on sotalol and has done fairly well with that.  The dose was slowly increased.  The lab work which was done at the hospital showed mild thrombocytosis.  Her TSH was 7.10.  T4 was 7.9.  She came today stating that she has been noticing increasing fatigue.  She wants to sleep all the time.  She had labs done at your office recently.  Her platelets continue to be elevated at 507.  Her MCV was slightly low with a hemoglobin of 10.2.  Her TSH was 5.580 for which she is now taking thyroid supplements.  Her potassium was slightly elevated at 5.4.  She unfortunately still continues to smoke.            Cardiac History  Past Surgical History:   Procedure Laterality Date   • CARDIOVASCULAR STRESS TEST  11/30/2017    L.Myoview- R/O Anterior Ischemia.   • CARDIOVASCULAR STRESS TEST  02/24/2021    @ [facility]. Lexiscan- EF 72%. Negative   • CONVERTED (HISTORICAL) HOLTER  02/12/2020    AVG 77. . 6.8% PVC. one 6 beats run of V-Tach   • ECHO - CONVERTED  11/30/2017    EF 65%. Mild-Mod MR   • ECHO - CONVERTED  03/12/2020    @ [facility]. EF 70%. Mild MR & AI. Negative shunt   • ECHO - CONVERTED  02/24/2021    @ [facility]. EF 60%. Mild MR   • INTERVENTIONAL RADIOLOGY PROCEDURE  Bilateral 3/11/2020    CAROTID CEREBRAL ANGIOGRAM- Thromboectomy of RMCA Thrombus   • OTHER SURGICAL HISTORY  2015    CT Scan- 60% (L) ICA. 50% (R) ICA   • US CAROTID UNILATERAL  11/16/2017    @ [facility]. No sig stenosis   • US CAROTID UNILATERAL  03/11/2020    @ [facility]- No sig lesion       Current Outpatient Medications   Medication Sig Dispense Refill   • acetaminophen (TYLENOL) 325 MG tablet Take 2 tablets by mouth Every 4 (Four) Hours As Needed for Mild Pain .     • albuterol sulfate  (90 Base) MCG/ACT inhaler Inhale 2 puffs Every 4 (Four) Hours As Needed for Wheezing.     • apixaban (ELIQUIS) 5 MG tablet tablet Take 1 tablet by mouth Every 12 (Twelve) Hours. 180 tablet 3   • aspirin (aspirin) 81 MG EC tablet Take 1 tablet by mouth Daily. 90 tablet 3   • Budesonide (ENTOCORT EC) 3 MG 24 hr capsule Take 3 mg by mouth Every Morning.     • busPIRone (BUSPAR) 5 MG tablet Take 5 mg by mouth 3 (Three) Times a Day.     • Cholecalciferol (VITAMIN D) 125 MCG (5000 UT) capsule capsule Take 5,000 Units by mouth Daily.     • DULoxetine (CYMBALTA) 60 MG capsule Take 60 mg by mouth 2 (Two) Times a Day.     • glucosamine-chondroitin 500-400 MG capsule capsule Take  by mouth 2 (Two) Times a Day With Meals.     • imipramine (TOFRANIL) 25 MG tablet Take 25 mg by mouth Every Night.     • levothyroxine (SYNTHROID, LEVOTHROID) 25 MCG tablet Take 25 mcg by mouth Daily.     • loratadine (CLARITIN) 10 MG tablet Take 10 mg by mouth Daily.     • megestrol (MEGACE) 40 MG tablet 5 tabs twice a day     • ondansetron (ZOFRAN) 4 MG tablet Take 4 mg by mouth Every 8 (Eight) Hours As Needed for Nausea or Vomiting.     • pantoprazole (PROTONIX) 20 MG EC tablet Take 20 mg by mouth 2 (two) times a day.     • Polyethylene Glycol 3350 (MIRALAX PO) Take  by mouth Daily As Needed.     • sertraline (ZOLOFT) 25 MG tablet Take 25 mg by mouth Daily.     • vitamin B-12 (CYANOCOBALAMIN) 1000 MCG tablet Take 2,000 mcg by mouth Daily.     • atorvastatin  (LIPITOR) 40 MG tablet Take 1 tablet by mouth Daily. 90 tablet 3   • lisinopril (PRINIVIL,ZESTRIL) 5 MG tablet Take 1 tablet by mouth Daily. 90 tablet 3   • Sotalol HCl, AF, 120 MG tablet Take 120 mg by mouth 2 (two) times a day. 180 each 3     No current facility-administered medications for this visit.       Allergies  :  Patient has no known allergies.       Past Medical History:   Diagnosis Date   • History of tonsillectomy 1951   • History of tubal ligation    • Hyperlipidemia        Social History     Socioeconomic History   • Marital status:      Spouse name: Not on file   • Number of children: Not on file   • Years of education: Not on file   • Highest education level: Not on file   Tobacco Use   • Smoking status: Current Every Day Smoker     Packs/day: 1.00     Years: 60.00     Pack years: 60.00   • Smokeless tobacco: Never Used   • Tobacco comment: patient counseled on effects of tobacco use on health   Substance and Sexual Activity   • Alcohol use: No   • Drug use: No       Family History   Problem Relation Age of Onset   • Hyperlipidemia Mother    • Hypertension Mother    • Stroke Mother    • Heart attack Father    • Aneurysm Father    • Alzheimer's disease Maternal Aunt    • Stroke Maternal Grandmother    • Stroke Maternal Grandfather        Review of Systems   Constitutional: Positive for malaise/fatigue. Negative for decreased appetite.   HENT: Negative for congestion and sore throat.    Eyes: Negative for blurred vision.   Cardiovascular: Positive for dyspnea on exertion. Negative for chest pain.   Respiratory: Positive for shortness of breath. Negative for snoring.    Endocrine: Negative for cold intolerance and heat intolerance.   Hematologic/Lymphatic: Negative for adenopathy. Does not bruise/bleed easily.   Skin: Negative for itching, nail changes and skin cancer.   Musculoskeletal: Negative for arthritis and myalgias.   Gastrointestinal: Negative for abdominal pain, dysphagia and  "heartburn.   Genitourinary: Negative for bladder incontinence and frequency.   Neurological: Negative for dizziness, light-headedness, seizures and vertigo.   Psychiatric/Behavioral: Negative for altered mental status.   Allergic/Immunologic: Negative for environmental allergies and hives.       Diabetes- Yes  Thyroid- abnormal    Objective     /80   Pulse 100   Temp 98.4 °F (36.9 °C)   Ht 160 cm (63\")   Wt 49.9 kg (110 lb)   BMI 19.49 kg/m²     Vitals and nursing note reviewed.   Constitutional:       Appearance: Healthy appearance. Not in distress.   Eyes:      Conjunctiva/sclera: Conjunctivae normal.      Pupils: Pupils are equal, round, and reactive to light.   HENT:      Head: Normocephalic.   Pulmonary:      Effort: Pulmonary effort is normal.      Breath sounds: Normal breath sounds.   Cardiovascular:      PMI at left midclavicular line. Normal rate. Regular rhythm.   Abdominal:      General: Bowel sounds are normal.      Palpations: Abdomen is soft.   Musculoskeletal: Normal range of motion.      Cervical back: Normal range of motion and neck supple. Skin:     General: Skin is warm and dry.   Neurological:      Mental Status: Alert, oriented to person, place, and time and oriented to person, place and time.         ECG 12 Lead    Date/Time: 3/18/2021 4:22 PM  Performed by: Tonya Lee MD  Authorized by: Tonya Lee MD   Comparison: compared with previous ECG from 3/2/2021  Comparison to previous ECG: Rate has gone up  Rhythm: sinus tachycardia  Rate: tachycardic  Conduction: non-specific intraventricular conduction delay  QRS axis: normal    Clinical impression: abnormal EKG                    Assessment/Plan   Patient's Body mass index is 19.49 kg/m². BMI is within normal parameters. No follow-up required..     Diagnoses and all orders for this visit:    1. Chronic fatigue (Primary)  -     Overnight Sleep Oximetry Study; Future    2. Pulmonary emphysema, unspecified emphysema " type (CMS/HCC)    3. PAF (paroxysmal atrial fibrillation) (CMS/HCC)  -     Sotalol HCl, AF, 120 MG tablet; Take 120 mg by mouth 2 (two) times a day.  Dispense: 180 each; Refill: 3  -     apixaban (ELIQUIS) 5 MG tablet tablet; Take 1 tablet by mouth Every 12 (Twelve) Hours.  Dispense: 180 tablet; Refill: 3    4. PSVT (paroxysmal supraventricular tachycardia) (CMS/HCC)    5. Hypercholesteremia  -     atorvastatin (LIPITOR) 40 MG tablet; Take 1 tablet by mouth Daily.  Dispense: 90 tablet; Refill: 3    6. Postural hypotension  -     lisinopril (PRINIVIL,ZESTRIL) 5 MG tablet; Take 1 tablet by mouth Daily.  Dispense: 90 tablet; Refill: 3    7. Hypothyroidism, unspecified type    8. Old cerebrovascular accident (CVA) without late effect  -     aspirin (aspirin) 81 MG EC tablet; Take 1 tablet by mouth Daily.  Dispense: 90 tablet; Refill: 3    At baseline she is still tachycardic.  Her blood pressure is normal.  Her clinical examination is unremarkable.  Her BMI is around 20.  Her EKG shows sinus tachycardia, QTC within normal limit.  Regarding her postural blood pressure on lying down it was 142/80 with a heart rate of 96.  On sitting it was 128/80 with a heart rate of 100.  On standing it was 94/58 with a heart rate of 120.    Regarding her PAF, I increased the dose of sotalol to 120 mg twice a day.  Continue the Eliquis at 5 mg twice a day.  She needs an EKG in the next few days.    Regarding her postural hypotension.  Her blood pressure still continues to show evidence of postural hypotension.  I reduce the dose of lisinopril to 5 mg.  If her blood pressure is maintaining then we should be able to go off the lisinopril.  She also need her labs to be done especially her potassium level.    Regarding her hypercholesterolemia, it is very well controlled.  At this time I think we can reduce the dose of Lipitor to 40 mg once a day    Regarding her hypothyroidism, she is now taking supplements.  May need to have the TSH level  checked.    Regarding-year-old CVA, continue the Eliquis but I also like to continue aspirin especially with thrombocytosis    Regarding her chronic fatigue, she need to rule out sleep apnea.  I advised her to check her nocturnal pulse PO2 measurement.    Regarding her smoking, I talked to her again and she refused to consider smoking cessation.    I will see her back in 6 months or sooner if needed                    Electronically signed by Tonya Lee MD March 18, 2021 16:16 EDT

## 2021-03-25 NOTE — PROGRESS NOTES
Procedure     ECG 12 Lead    Date/Time: 3/25/2021 10:46 AM  Performed by: Tonya Lee MD  Authorized by: Tonya Lee MD   Comparison: compared with previous ECG from 3/18/2021  Comparison to previous ECG: Rate is better  Rhythm: sinus rhythm  Ectopy: unifocal PVCs  Rate: normal  Conduction: non-specific intraventricular conduction delay  Q waves: V1 and V2    QRS axis: normal    Clinical impression: abnormal EKG

## 2021-03-25 NOTE — TELEPHONE ENCOUNTER
Patient's daughter, Sameera, made aware for patient to continue same medications per Dr. Lee after reviewing EKG.

## 2021-06-22 NOTE — TELEPHONE ENCOUNTER
Pt called requesting refills on Sotalol 80 mg BID be sent to [facility]. Will call to clarify dose. Pt is taking 80 mg twice a day and doing really well. Reports it was never increased. Had only been taking 1/2 of the original dose. Is it ok to send in the 80 mg BID?

## 2021-06-24 NOTE — TELEPHONE ENCOUNTER
Last EKG was 3/25/21, while on Sotalol 80 mg BID.     Date/Time: 3/25/2021 10:46 AM   Performed by: Tonya Lee MD   Authorized by: Tonya Lee MD   Comparison: compared with previous ECG from 3/18/2021   Comparison to previous ECG: Rate is better   Rhythm: sinus rhythm   Ectopy: unifocal PVCs   Rate: normal   Conduction: non-specific intraventricular conduction delay   Q waves: V1 and V2

## 2021-07-20 NOTE — TELEPHONE ENCOUNTER
Sameera called, pt has been having more SOB, heart racing, weakness. She is seeing PCP and they are sending EKG, Labs and office note. Dr kinney aware, he will wait to review records before giving recommendations.

## 2021-07-27 NOTE — TELEPHONE ENCOUNTER
Pt was anemic, Dr Lee ordered blood last week. He checked labs in [facility] computer today,  WNL, does not need another transfusion. Pt aware.

## 2021-09-28 PROBLEM — I10 ESSENTIAL HYPERTENSION: Status: RESOLVED | Noted: 2020-01-22 | Resolved: 2021-01-01

## 2021-09-28 PROBLEM — R06.02 SHORTNESS OF BREATH: Status: ACTIVE | Noted: 2021-01-01

## 2021-09-28 PROBLEM — D50.9 IRON DEFICIENCY ANEMIA: Status: ACTIVE | Noted: 2021-01-01

## 2021-09-28 NOTE — PROGRESS NOTES
Chief Complaint   Patient presents with   • Follow-up     for cardiac management, doing well since getting blood transfusion   • Med Refill     refills needed on cardiac meds.  90 days to [facility]   • Labs     PCP checks labs once a month. Routine labs checked 9/3, results in chart.        CARDIAC COMPLAINTS  fatigue        Subjective   Maria Luz Wade is a 75 y.o. female came in today for her regular follow-up visit.  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  She has undergone thrombectomy in the past.  She came today for the follow-up visit.  After her last visit she was getting more tired and weak and found to be significantly anemic.  She was given blood transfusion for 2 days.  After the blood transfusion she is doing much better now.  She is not feeling that tired still has some episodes of not able to do activities as before.  She did undergo some lab work recently.  Her hemoglobin was around 10 with a crit of 35.  The TSH was normal.  Vitamin D was normal.  Her lipid profile was excellent with the LDL of 80.  Blood sugar was 117.  She unfortunately still continues to smoke and has no plans to quit smoking.  She denies having any chest pain at this time.  She denies having any palpitation at this time.              Cardiac History  Past Surgical History:   Procedure Laterality Date   • CARDIOVASCULAR STRESS TEST  11/30/2017    L.Myoview- R/O Anterior Ischemia.   • CARDIOVASCULAR STRESS TEST  02/24/2021    @ [facility]. Lexiscan- EF 72%. Negative   • CONVERTED (HISTORICAL) HOLTER  02/12/2020    AVG 77. . 6.8% PVC. one 6 beats run of V-Tach   • ECHO - CONVERTED  11/30/2017    EF 65%. Mild-Mod MR   • ECHO - CONVERTED  03/12/2020    @ [facility]. EF 70%. Mild MR & AI. Negative shunt   • ECHO - CONVERTED  02/24/2021    @ [facility]. EF 60%. Mild MR   • INTERVENTIONAL RADIOLOGY PROCEDURE Bilateral 3/11/2020    CAROTID CEREBRAL ANGIOGRAM- Thromboectomy of RMCA Thrombus    • OTHER SURGICAL HISTORY  2015    CT Scan- 60% (L) ICA. 50% (R) ICA   • US CAROTID UNILATERAL  11/16/2017    @ [facility]. No sig stenosis   • US CAROTID UNILATERAL  03/11/2020    @ [facility]- No sig lesion       Current Outpatient Medications   Medication Sig Dispense Refill   • acetaminophen (TYLENOL) 325 MG tablet Take 2 tablets by mouth Every 4 (Four) Hours As Needed for Mild Pain .     • albuterol sulfate  (90 Base) MCG/ACT inhaler Inhale 2 puffs Every 4 (Four) Hours As Needed for Wheezing.     • apixaban (ELIQUIS) 5 MG tablet tablet Take 1 tablet by mouth Every 12 (Twelve) Hours. 180 tablet 3   • aspirin (aspirin) 81 MG EC tablet Take 1 tablet by mouth Daily. 90 tablet 3   • atorvastatin (LIPITOR) 40 MG tablet Take 1 tablet by mouth Daily. 90 tablet 3   • Budesonide (ENTOCORT EC) 3 MG 24 hr capsule Take 3 mg by mouth Every Morning.     • busPIRone (BUSPAR) 5 MG tablet Take 5 mg by mouth 3 (Three) Times a Day.     • Cholecalciferol (VITAMIN D) 125 MCG (5000 UT) capsule capsule Take 5,000 Units by mouth Daily.     • DULoxetine (CYMBALTA) 60 MG capsule Take 60 mg by mouth 2 (Two) Times a Day.     • glucosamine-chondroitin 500-400 MG capsule capsule Take  by mouth 2 (Two) Times a Day With Meals.     • imipramine (TOFRANIL) 25 MG tablet Take 25 mg by mouth Every Night.     • levothyroxine (SYNTHROID, LEVOTHROID) 25 MCG tablet Take 25 mcg by mouth Daily.     • loratadine (CLARITIN) 10 MG tablet Take 10 mg by mouth Daily.     • megestrol (MEGACE) 40 MG tablet 5 tabs twice a day     • ondansetron (ZOFRAN) 4 MG tablet Take 4 mg by mouth Every 8 (Eight) Hours As Needed for Nausea or Vomiting.     • pantoprazole (PROTONIX) 20 MG EC tablet Take 20 mg by mouth Daily.     • sertraline (ZOLOFT) 25 MG tablet Take 25 mg by mouth Daily.     • Sotalol HCl AF 80 MG tablet Take 1 tablet by mouth 2 (Two) Times a Day. 180 tablet 3   • vitamin B-12 (CYANOCOBALAMIN) 1000 MCG tablet Take 2,000 mcg by mouth Daily.     • lisinopril  "(PRINIVIL,ZESTRIL) 2.5 MG tablet Take 1 tablet by mouth Daily. 90 tablet 3   • Polyethylene Glycol 3350 (MIRALAX PO) Take  by mouth Daily As Needed.       No current facility-administered medications for this visit.       Allergies  :  Patient has no known allergies.       Past Medical History:   Diagnosis Date   • History of tonsillectomy 1951   • History of tubal ligation    • Hyperlipidemia        Social History     Socioeconomic History   • Marital status:      Spouse name: Not on file   • Number of children: Not on file   • Years of education: Not on file   • Highest education level: Not on file   Tobacco Use   • Smoking status: Current Every Day Smoker     Packs/day: 1.00     Years: 60.00     Pack years: 60.00   • Smokeless tobacco: Never Used   • Tobacco comment: patient counseled on effects of tobacco use on health   Substance and Sexual Activity   • Alcohol use: No   • Drug use: No       Family History   Problem Relation Age of Onset   • Hyperlipidemia Mother    • Hypertension Mother    • Stroke Mother    • Heart attack Father    • Aneurysm Father    • Alzheimer's disease Maternal Aunt    • Stroke Maternal Grandmother    • Stroke Maternal Grandfather        ROS    Diabetes- No  Thyroid- abnormal    Objective     /84   Pulse 75   Temp 97.5 °F (36.4 °C)   Ht 160 cm (63\")   Wt 49.4 kg (109 lb)   BMI 19.31 kg/m²     Physical Exam    ECG 12 Lead    Date/Time: 9/29/2021 10:32 AM  Performed by: Tonya Lee MD  Authorized by: Tonya Lee MD   Comparison: compared with previous ECG from 3/25/2021  Similar to previous ECG  Rhythm: sinus rhythm  Rate: normal  Q waves: V1 and V2    QRS axis: normal    Clinical impression: abnormal EKG                    Assessment/Plan   Patient's Body mass index is 19.31 kg/m². indicating that she is within normal range (BMI 18.5-24.9). No BMI management plan needed..     Diagnoses and all orders for this visit:    1. Essential hypertension " (Primary)  -     lisinopril (PRINIVIL,ZESTRIL) 2.5 MG tablet; Take 1 tablet by mouth Daily.  Dispense: 90 tablet; Refill: 3    2. PAF (paroxysmal atrial fibrillation) (CMS/HCC)  -     apixaban (ELIQUIS) 5 MG tablet tablet; Take 1 tablet by mouth Every 12 (Twelve) Hours.  Dispense: 180 tablet; Refill: 3  -     Sotalol HCl AF 80 MG tablet; Take 1 tablet by mouth 2 (Two) Times a Day.  Dispense: 180 tablet; Refill: 3    3. Hypercholesteremia  -     atorvastatin (LIPITOR) 40 MG tablet; Take 1 tablet by mouth Daily.  Dispense: 90 tablet; Refill: 3    4. PSVT (paroxysmal supraventricular tachycardia) (CMS/HCC)    5. Chronic fatigue    6. Iron deficiency anemia, unspecified iron deficiency anemia type    7. Shortness of breath    8. Smoking    9. PAF (paroxysmal atrial fibrillation) (HCC)  -     apixaban (ELIQUIS) 5 MG tablet tablet; Take 1 tablet by mouth Every 12 (Twelve) Hours.  Dispense: 180 tablet; Refill: 3  -     Sotalol HCl AF 80 MG tablet; Take 1 tablet by mouth 2 (Two) Times a Day.  Dispense: 180 tablet; Refill: 3    10. Postural hypotension    11. Hypothyroidism, unspecified type    At baseline her heart rate is stable.  Her blood pressure is slightly elevated.  She used to be on lisinopril which was stopped when she was anemic.  Her EKG showed she is in sinus rhythm with Q waves in anterior leads with no significant ST-T changes.  Her clinical examination reveals a BMI of 19.    Regarding the hypertension, I restarted her on lisinopril but much lower dose at 2.5 mg once a day.  During her next lab work we will recheck the renal function    Regarding the PAF, she is maintaining sinus rhythm with sotalol.  Continue the same along with Eliquis.    Regarding her history of hypercholesterolemia she is doing very well with moderate dose of Lipitor.  Her lipids are normal and LFTs normal so continue the same    Regarding her history of PSVT she has not had any more arrhythmia    Regarding her chronic fatigue which most  likely secondary to his anemia the hemoglobin is now around 10.  If she develops any more anemia then she may need a little more blood transfusion.  Meanwhile advised her to talk to you about iron supplements    Regarding the shortness of breath most likely it is due to the smoking.  I talked to her again but at this time she still declined to quit    Regarding the history of postural hypotension, it is resolved and now she is becoming hypertensive so lisinopril will be added    Regarding the hypothyroidism, she is taking supplements.    Overall cardiac status appears stable.  I will see her back in 6 months or sooner if needed.                    Electronically signed by Tonya Lee MD September 28, 2021 16:29 EDT

## (undated) DEVICE — CATH MIC ORION21 2.4X2.6F .021IN 150CM

## (undated) DEVICE — TREVO XP PROVUE RETRIEVER: Brand: TREVO XP

## (undated) DEVICE — STPCK LP 1WY RA 200PSI

## (undated) DEVICE — RADIFOCUS TORQUE DEVICE MULTI-TORQUE VISE: Brand: RADIFOCUS TORQUE DEVICE

## (undated) DEVICE — CATH TEMPO 5F BER 100CM: Brand: TEMPO

## (undated) DEVICE — GUIDEWIRE WITH ICE™ HYDROPHILIC COATING: Brand: TRANSEND™ EX

## (undated) DEVICE — STPCK 3/WY HP M/RA W/OFF/HNDL 1050PSI STRL

## (undated) DEVICE — BALLOON GUIDE CATHETER: Brand: FLOWGATE2

## (undated) DEVICE — RADIFOCUS GLIDEWIRE: Brand: GLIDEWIRE

## (undated) DEVICE — PINNACLE INTRODUCER SHEATH: Brand: PINNACLE

## (undated) DEVICE — LEX NEURO ANGIOGRAPHY: Brand: MEDLINE INDUSTRIES, INC.

## (undated) DEVICE — ANGIO-SEAL VIP VASCULAR CLOSURE DEVICE: Brand: ANGIO-SEAL

## (undated) DEVICE — ROTATING HEMOSTATIC VALVE .096": Brand: RHV

## (undated) DEVICE — ST ACC MICROPUNCTURE .018 TRANSLSS/SS/TP 5F/10CM 21G/7CM

## (undated) DEVICE — Device